# Patient Record
Sex: MALE | Race: BLACK OR AFRICAN AMERICAN | NOT HISPANIC OR LATINO | Employment: UNEMPLOYED | URBAN - METROPOLITAN AREA
[De-identification: names, ages, dates, MRNs, and addresses within clinical notes are randomized per-mention and may not be internally consistent; named-entity substitution may affect disease eponyms.]

---

## 2021-05-22 ENCOUNTER — APPOINTMENT (OUTPATIENT)
Dept: VACCINE CLINIC | Facility: HOSPITAL | Age: 21
End: 2021-05-22

## 2022-08-31 ENCOUNTER — OFFICE VISIT (OUTPATIENT)
Dept: FAMILY MEDICINE CLINIC | Facility: CLINIC | Age: 22
End: 2022-08-31

## 2022-08-31 ENCOUNTER — TELEPHONE (OUTPATIENT)
Dept: FAMILY MEDICINE CLINIC | Facility: CLINIC | Age: 22
End: 2022-08-31

## 2022-08-31 VITALS
DIASTOLIC BLOOD PRESSURE: 88 MMHG | BODY MASS INDEX: 32.88 KG/M2 | OXYGEN SATURATION: 100 % | WEIGHT: 222 LBS | TEMPERATURE: 97.7 F | HEART RATE: 73 BPM | SYSTOLIC BLOOD PRESSURE: 138 MMHG | HEIGHT: 69 IN

## 2022-08-31 DIAGNOSIS — Z00.00 PE (PHYSICAL EXAM), ANNUAL: Primary | ICD-10-CM

## 2022-08-31 DIAGNOSIS — K29.61 GASTRIC HEMORRHAGE DUE TO EROSIVE GASTRITIS: ICD-10-CM

## 2022-08-31 PROBLEM — T78.40XA ALLERGIC: Status: ACTIVE | Noted: 2022-08-31

## 2022-08-31 PROBLEM — K21.9 GERD (GASTROESOPHAGEAL REFLUX DISEASE): Status: ACTIVE | Noted: 2022-08-31

## 2022-08-31 PROBLEM — J45.909 ASTHMA: Status: ACTIVE | Noted: 2022-08-31

## 2022-08-31 PROBLEM — F90.9 ADHD: Status: ACTIVE | Noted: 2022-08-31

## 2022-08-31 LAB
25(OH)D3 SERPL-MCNC: 19.7 NG/ML (ref 30–100)
ALBUMIN SERPL-MCNC: 4.8 G/DL (ref 3.5–5.2)
ALBUMIN/GLOB SERPL: 1.9 G/DL
ALP SERPL-CCNC: 76 U/L (ref 39–117)
ALT SERPL W P-5'-P-CCNC: 15 U/L (ref 1–41)
ANION GAP SERPL CALCULATED.3IONS-SCNC: 9 MMOL/L (ref 5–15)
AST SERPL-CCNC: 17 U/L (ref 1–40)
BILIRUB SERPL-MCNC: 0.8 MG/DL (ref 0–1.2)
BUN SERPL-MCNC: 9 MG/DL (ref 6–20)
BUN/CREAT SERPL: 8.7 (ref 7–25)
CALCIUM SPEC-SCNC: 9.7 MG/DL (ref 8.6–10.5)
CHLORIDE SERPL-SCNC: 103 MMOL/L (ref 98–107)
CHOLEST SERPL-MCNC: 145 MG/DL (ref 0–200)
CO2 SERPL-SCNC: 27 MMOL/L (ref 22–29)
CREAT SERPL-MCNC: 1.04 MG/DL (ref 0.76–1.27)
DEPRECATED RDW RBC AUTO: 36.2 FL (ref 37–54)
EGFRCR SERPLBLD CKD-EPI 2021: 103.5 ML/MIN/1.73
ERYTHROCYTE [DISTWIDTH] IN BLOOD BY AUTOMATED COUNT: 13 % (ref 12.3–15.4)
GLOBULIN UR ELPH-MCNC: 2.5 GM/DL
GLUCOSE SERPL-MCNC: 83 MG/DL (ref 65–99)
HCT VFR BLD AUTO: 50.3 % (ref 37.5–51)
HDLC SERPL-MCNC: 36 MG/DL (ref 40–60)
HGB BLD-MCNC: 16.5 G/DL (ref 13–17.7)
LDLC SERPL CALC-MCNC: 90 MG/DL (ref 0–100)
LDLC/HDLC SERPL: 2.45 {RATIO}
MCH RBC QN AUTO: 25.9 PG (ref 26.6–33)
MCHC RBC AUTO-ENTMCNC: 32.8 G/DL (ref 31.5–35.7)
MCV RBC AUTO: 79.1 FL (ref 79–97)
PLATELET # BLD AUTO: 260 10*3/MM3 (ref 140–450)
PMV BLD AUTO: 11.7 FL (ref 6–12)
POTASSIUM SERPL-SCNC: 4 MMOL/L (ref 3.5–5.2)
PROT SERPL-MCNC: 7.3 G/DL (ref 6–8.5)
RBC # BLD AUTO: 6.36 10*6/MM3 (ref 4.14–5.8)
SODIUM SERPL-SCNC: 139 MMOL/L (ref 136–145)
TRIGL SERPL-MCNC: 104 MG/DL (ref 0–150)
VLDLC SERPL-MCNC: 19 MG/DL (ref 5–40)
WBC NRBC COR # BLD: 7.95 10*3/MM3 (ref 3.4–10.8)

## 2022-08-31 PROCEDURE — 36415 COLL VENOUS BLD VENIPUNCTURE: CPT | Performed by: INTERNAL MEDICINE

## 2022-08-31 PROCEDURE — 85027 COMPLETE CBC AUTOMATED: CPT | Performed by: INTERNAL MEDICINE

## 2022-08-31 PROCEDURE — 99385 PREV VISIT NEW AGE 18-39: CPT | Performed by: INTERNAL MEDICINE

## 2022-08-31 PROCEDURE — 80053 COMPREHEN METABOLIC PANEL: CPT | Performed by: INTERNAL MEDICINE

## 2022-08-31 PROCEDURE — 82306 VITAMIN D 25 HYDROXY: CPT | Performed by: INTERNAL MEDICINE

## 2022-08-31 PROCEDURE — 80061 LIPID PANEL: CPT | Performed by: INTERNAL MEDICINE

## 2022-08-31 RX ORDER — ERGOCALCIFEROL 1.25 MG/1
50000 CAPSULE ORAL WEEKLY
Qty: 16 CAPSULE | Refills: 3 | Status: SHIPPED | OUTPATIENT
Start: 2022-08-31

## 2022-08-31 RX ORDER — MONTELUKAST SODIUM 10 MG/1
TABLET ORAL
COMMUNITY
End: 2022-09-08 | Stop reason: SDUPTHER

## 2022-08-31 RX ORDER — EPINEPHRINE 0.3 MG/.3ML
0.3 INJECTION SUBCUTANEOUS
COMMUNITY
Start: 2022-04-28

## 2022-08-31 RX ORDER — LEVALBUTEROL TARTRATE 45 UG/1
1-2 AEROSOL, METERED ORAL
COMMUNITY
Start: 2022-04-28 | End: 2023-03-01 | Stop reason: SDUPTHER

## 2022-08-31 RX ORDER — LEVALBUTEROL INHALATION SOLUTION 0.63 MG/3ML
SOLUTION RESPIRATORY (INHALATION)
COMMUNITY
End: 2023-03-01 | Stop reason: SDUPTHER

## 2022-08-31 RX ORDER — CETIRIZINE HYDROCHLORIDE 10 MG/1
1 TABLET ORAL DAILY
COMMUNITY
Start: 2022-04-26 | End: 2022-09-01 | Stop reason: SDUPTHER

## 2022-08-31 NOTE — PROGRESS NOTES
"Chief Complaint  new pcp    Subjective        Brandon Marsh presents to [facility]  History of Present Illness patient was seen for a physical.  Patient diet and activity discussed at this visit.  Patient had a blood pressure in the 138/88.  Patient is only 21 years old.  Patient was advised to check blood pressure and follow-up in 1 month.  Patient also had a severe history of his erosive gastritis as a child.  Patient was so bad they were thinking about inserting a G-tube.  Patient is being scheduled for EGD to assess his status.    Objective   Vital Signs:  Blood Pressure 138/88   Pulse 73   Temperature 97.7 °F (36.5 °C)   Height 175.3 cm (69\")   Weight 101 kg (222 lb)   Oxygen Saturation 100%   Body Mass Index 32.78 kg/m²   Estimated body mass index is 32.78 kg/m² as calculated from the following:    Height as of this encounter: 175.3 cm (69\").    Weight as of this encounter: 101 kg (222 lb).          Physical Exam  Vitals and nursing note reviewed.   Constitutional:       General: He is not in acute distress.     Appearance: Normal appearance. He is well-developed. He is not ill-appearing, toxic-appearing or diaphoretic.   HENT:      Head: Normocephalic and atraumatic.      Right Ear: Tympanic membrane, ear canal and external ear normal. There is no impacted cerumen.      Left Ear: Tympanic membrane, ear canal and external ear normal. There is no impacted cerumen.      Nose: Nose normal. No congestion or rhinorrhea.      Mouth/Throat:      Mouth: Mucous membranes are moist.      Pharynx: Oropharynx is clear. No oropharyngeal exudate or posterior oropharyngeal erythema.   Eyes:      General: No scleral icterus.        Right eye: No discharge.         Left eye: No discharge.      Extraocular Movements: Extraocular movements intact.      Conjunctiva/sclera: Conjunctivae normal.      Pupils: Pupils are equal, round, and reactive to light.   Neck:      Thyroid: No thyromegaly. "      Vascular: No carotid bruit or JVD.      Trachea: No tracheal deviation.   Cardiovascular:      Rate and Rhythm: Normal rate and regular rhythm.      Heart sounds: Normal heart sounds. No murmur heard.    No friction rub. No gallop.   Pulmonary:      Effort: Pulmonary effort is normal. No respiratory distress.      Breath sounds: Normal breath sounds. No stridor. No wheezing, rhonchi or rales.   Chest:      Chest wall: No tenderness.   Abdominal:      General: Bowel sounds are normal. There is no distension.      Palpations: Abdomen is soft. There is no mass.      Tenderness: There is no abdominal tenderness. There is no right CVA tenderness, left CVA tenderness, guarding or rebound.      Hernia: No hernia is present.   Musculoskeletal:         General: No swelling, tenderness, deformity or signs of injury. Normal range of motion.      Cervical back: Normal range of motion and neck supple. No rigidity. No muscular tenderness.      Right lower leg: No edema.      Left lower leg: No edema.   Lymphadenopathy:      Cervical: No cervical adenopathy.   Skin:     General: Skin is warm and dry.      Coloration: Skin is not jaundiced or pale.      Findings: No bruising, erythema, lesion or rash.   Neurological:      General: No focal deficit present.      Mental Status: He is alert and oriented to person, place, and time. Mental status is at baseline.      Cranial Nerves: No cranial nerve deficit.      Sensory: No sensory deficit.      Motor: No weakness or abnormal muscle tone.      Coordination: Coordination normal.      Gait: Gait normal.      Deep Tendon Reflexes: Reflexes normal.   Psychiatric:         Mood and Affect: Mood normal.         Behavior: Behavior normal.         Thought Content: Thought content normal.         Judgment: Judgment normal.        Result Review :                Assessment and Plan  #1 physical #2 labs #3 DASH diet with low impact exercise 30 minutes 3-5 times a week #4 monitor blood pressure  follow-up 1 month  Diagnoses and all orders for this visit:    1. PE (physical exam), annual (Primary)  -     CBC (No Diff); Future  -     Comprehensive Metabolic Panel; Future  -     Lipid Panel; Future  -     Vitamin D 25 Hydroxy; Future    2. Gastric hemorrhage due to erosive gastritis  -     Ambulatory Referral to Gastroenterology             Follow Up   Return in about 1 year (around 8/31/2023), or if symptoms worsen or fail to improve, for Annual physical.  Patient was given instructions and counseling regarding his condition or for health maintenance advice. Please see specific information pulled into the AVS if appropriate.

## 2022-09-01 DIAGNOSIS — D56.0 ALPHA THALASSEMIA: Primary | ICD-10-CM

## 2022-09-01 RX ORDER — CETIRIZINE HYDROCHLORIDE 10 MG/1
10 TABLET ORAL DAILY
Qty: 30 TABLET | Refills: 1 | Status: SHIPPED | OUTPATIENT
Start: 2022-09-01 | End: 2022-10-25

## 2022-09-01 NOTE — TELEPHONE ENCOUNTER
Caller: Maximo Brandon    Relationship: Self    Best call back number: [phone] (H)      Requested Prescriptions:   Requested Prescriptions     Pending Prescriptions Disp Refills   • cetirizine (zyrTEC) 10 MG tablet       Sig: Take 1 tablet by mouth Daily.        Pharmacy where request should be sent: [facility]/PHARMACY #[id number] [place], [place] - [address] . - [phone]  - [phone] FX     Additional details provided by patient:     Does the patient have less than a 3 day supply:  [x] Yes  [] No    Rere Lira Rep   09/01/22 10:45 EDT

## 2022-09-08 RX ORDER — MONTELUKAST SODIUM 10 MG/1
10 TABLET ORAL NIGHTLY
Qty: 30 TABLET | Refills: 1 | Status: SHIPPED | OUTPATIENT
Start: 2022-09-08 | End: 2022-11-04

## 2022-09-12 RX ORDER — MONTELUKAST SODIUM 10 MG/1
TABLET ORAL
OUTPATIENT
Start: 2022-09-12

## 2022-09-19 ENCOUNTER — PATIENT ROUNDING (BHMG ONLY) (OUTPATIENT)
Dept: GASTROENTEROLOGY | Facility: CLINIC | Age: 22
End: 2022-09-19

## 2022-09-19 ENCOUNTER — OFFICE VISIT (OUTPATIENT)
Dept: GASTROENTEROLOGY | Facility: CLINIC | Age: 22
End: 2022-09-19

## 2022-09-19 VITALS
DIASTOLIC BLOOD PRESSURE: 73 MMHG | HEIGHT: 69 IN | SYSTOLIC BLOOD PRESSURE: 114 MMHG | WEIGHT: 216.5 LBS | BODY MASS INDEX: 32.07 KG/M2 | HEART RATE: 83 BPM | TEMPERATURE: 97.2 F

## 2022-09-19 DIAGNOSIS — K21.9 GASTROESOPHAGEAL REFLUX DISEASE, UNSPECIFIED WHETHER ESOPHAGITIS PRESENT: Primary | ICD-10-CM

## 2022-09-19 PROCEDURE — 99204 OFFICE O/P NEW MOD 45 MIN: CPT | Performed by: INTERNAL MEDICINE

## 2022-09-19 NOTE — PROGRESS NOTES
Chief Complaint   Patient presents with   • Heartburn   • gastritis     Subjective   HPI     Brandon Marsh is a 21 y.o. male who presents today for new patient evaluation.  He is here to establish GI care.  He has a history of severe reflux as a child and was followed regularly for years by pediatric GI reportedly undergoing EGD on an almost yearly basis due to severe gastritis and esophagitis.  Previously been on various PPI formulations and even Reglan at one point.  Actually seems to have been doing relatively well over the last few years with minimal symptoms although the patient himself does not provide much collateral history most of the information was obtained from his mother who accompanied him with this visit.  She believes his last EGD was probably performed in middle school.  He is not currently requiring PPI therapy.         Objective      Vitals:    09/19/22 1356   BP: 114/73   Pulse: 83   Temp: 97.2 °F (36.2 °C)        Physical Exam  Vitals reviewed.   Constitutional:       Appearance: He is well-developed.   HENT:      Head: Normocephalic and atraumatic.   Neurological:      Mental Status: He is alert and oriented to person, place, and time.   Psychiatric:         Behavior: Behavior normal.         Thought Content: Thought content normal.         Judgment: Judgment normal.                   Assessment & Plan   Assessment:     1. Gastroesophageal reflux disease, unspecified whether esophagitis present      Plan:   21-year-old 21-year-old gentleman with longstanding heartburn dating back to childhood previously requiring frequent EGD.  Would recommend that we obtain an EGD for baseline and will determine if surveillance is necessary certainly if he were to have any findings of Perla's or other concerning features.  We will hold off on PPI therapy for now given relatively minimal symptoms he can use Pepcid as needed.              Ronak Rodriguez M.D.  [facility]  [id number]  [place] [address]  Office: [phone]

## 2022-09-19 NOTE — PROGRESS NOTES
September 19, 2022    Hello, may I speak with Brandon Marsh?    My name is Yen       I am  with [facility]  [address] [place] [id number] [phone].    Before we get started may I verify your date of birth? 2000    I am calling to officially welcome you to our practice and ask about your recent visit. Is this a good time to talk? Yes    Tell me about your visit with us. What things went well?  My entire visit went good.       We're always looking for ways to make our patients' experiences even better. Do you have recommendations on ways we may improve?  No.     Overall were you satisfied with your first visit to our practice? Yes       I appreciate you taking the time to speak with me today. Is there anything else I can do for you? No      Thank you, and have a great day.

## 2022-09-29 NOTE — PROGRESS NOTES
REFERRING PROVIDER:    Everett Dodd MD  [address] [place] [address]    REASON FOR CONSULTATION:    Family history of alpha thalassemia  Family history of von Willebrand's disease    HISTORY OF PRESENT ILLNESS:  Brandon Marsh is a 21 y.o. male who is referred today for the above issues.    His father has alpha thalassemia trait.  His mother has type I von Willebrand's disease.    The patient had epistaxis as a child, even into his teenage years.  This required cauterization.  He has not had any other bleeding.  He had his wisdom teeth extracted in 2019 without bleeding problems.    He has asthma related likely to a history of severe acid reflux as a child.  Otherwise no issues.  He states he feels well.        Past Medical History:   Diagnosis Date   • Allergic    • Asthma    • GERD (gastroesophageal reflux disease)    • Seasonal allergies        Past Surgical History:   Procedure Laterality Date   • COLONOSCOPY     • ENDOSCOPY N/A    • UPPER GASTROINTESTINAL ENDOSCOPY         SOCIAL HISTORY:   reports that he has never smoked. He has never used smokeless tobacco. He reports that he does not drink alcohol and does not use drugs.    FAMILY HISTORY:  family history includes Anxiety disorder in his father and sister; Arthritis in his mother; Cancer in his mother; Depression in his father; Heart disease in his father; Hypertension in his father.    ALLERGIES:  Allergies   Allergen Reactions   • Nuts Shortness Of Breath     tree       MEDICATIONS:  The medication list has been reviewed with the patient by the medical assistant, and the list has been updated in the electronic medical record, which I reviewed.  Medication dosages and frequencies were confirmed to be accurate.    Review of Systems   HENT: Positive for nosebleeds (Last as a teenager, none recently).    All other systems reviewed and are negative.      PHYSICAL EXAMINATION:  Vitals:    09/30/22 0958   BP: 132/80   Pulse: 81   Resp: 16  "  Temp: 98.2 °F (36.8 °C)   TempSrc: Temporal   SpO2: 97%   Weight: 98.5 kg (217 lb 3.2 oz)   Height: 175.3 cm (69.02\")   PainSc: 0-No pain     General:  No acute distress, awake, alert and oriented  Skin:  Warm and dry, no visible rash  HEENT:  Normocephalic/atraumatic.  Wearing a face mask.  Chest:  Normal respiratory effort  Extremities:  No visible clubbing, cyanosis, or edema  Neuro/psych:  Grossly nonfocal.  Normal mood and affect.        DIAGNOSTIC DATA:  CBC & Differential (09/30/2022 09:28)      IMAGING:    None reviewed  ASSESSMENT:  This is a 21 y.o. male with:    *Family history of alpha thalassemia  • His father has alpha thalassemia trait  • I suspect the patient has this to as his red blood cell count is mildly elevated and he has a mild microcytosis    *Family history of von Willebrand's disease  • His mother has type I von Willebrand's disease, treated with DDAVP  • The patient has a history of epistaxis as a child and teenager but he currently has no bleeding  • He did not have any bleeding after wisdom teeth extraction in 2019    PLAN:   1. Laboratory evaluation as noted below.  We will notify him of pertinent results and I will otherwise see him back in about 1 month to go over the results in more detail    ORDERS PLACED DURING THIS ENCOUNTER  Orders Placed This Encounter   Procedures   • Hemoglobinopathy Fractionation Cascade     Order Specific Question:   Release to patient     Answer:   Routine Release   • Alpha - Thalassemia     Order Specific Question:   Release to patient     Answer:   Routine Release   • Protime-INR     Order Specific Question:   Is the Patient on Coumadin (Warfarin) therapy?     Answer:   No   • aPTT     Order Specific Question:   Patient Receiving Heparin Therapy?     Answer:   No   • Fibrinogen     Order Specific Question:   Release to patient     Answer:   Routine Release   • Platelet Function Test     Order Specific Question:   Release to patient     Answer:   Routine " Release   • Von Willebrand Panel     Order Specific Question:   Release to patient     Answer:   Routine Release   • Ferritin     Order Specific Question:   Is the patient on iron therapy?     Answer:   No     Order Specific Question:   Release to patient     Answer:   Routine Release   • Iron Profile     Order Specific Question:   Is the patient on iron therapy?     Answer:   No     Order Specific Question:   Release to patient     Answer:   Routine Release   • Retic With IRF & RET-He     Order Specific Question:   Release to patient     Answer:   Routine Release   • ABO Type     Order Specific Question:   Release to patient     Answer:   Routine Release   • CBC & Differential     Standing Status:   Future     Number of Occurrences:   1     Standing Expiration Date:   9/29/2023     Order Specific Question:   Manual Differential     Answer:   No     Order Specific Question:   Release to patient     Answer:   Routine Release

## 2022-09-30 ENCOUNTER — LAB (OUTPATIENT)
Dept: OTHER | Facility: HOSPITAL | Age: 22
End: 2022-09-30

## 2022-09-30 ENCOUNTER — CONSULT (OUTPATIENT)
Dept: ONCOLOGY | Facility: CLINIC | Age: 22
End: 2022-09-30

## 2022-09-30 VITALS
DIASTOLIC BLOOD PRESSURE: 80 MMHG | WEIGHT: 217.2 LBS | HEART RATE: 81 BPM | RESPIRATION RATE: 16 BRPM | TEMPERATURE: 98.2 F | OXYGEN SATURATION: 97 % | SYSTOLIC BLOOD PRESSURE: 132 MMHG | HEIGHT: 69 IN | BODY MASS INDEX: 32.17 KG/M2

## 2022-09-30 DIAGNOSIS — D56.0 ALPHA THALASSEMIA: Primary | ICD-10-CM

## 2022-09-30 DIAGNOSIS — Z83.2 FAMILY HISTORY OF VON WILLEBRAND DISEASE: ICD-10-CM

## 2022-09-30 DIAGNOSIS — Z83.2 FAMILY HISTORY OF ALPHA THALASSEMIA: ICD-10-CM

## 2022-09-30 DIAGNOSIS — R71.8 MICROCYTOSIS: ICD-10-CM

## 2022-09-30 DIAGNOSIS — D56.0 ALPHA THALASSEMIA: ICD-10-CM

## 2022-09-30 LAB
ABO GROUP BLD: NORMAL
APTT PPP: 30.5 SECONDS (ref 22.7–35.4)
BASOPHILS # BLD AUTO: 0.06 10*3/MM3 (ref 0–0.2)
BASOPHILS NFR BLD AUTO: 1 % (ref 0–1.5)
CLOSURE TME COLL+ADP BLD: 116 SECONDS (ref 52–122)
CLOSURE TME COLL+EPINEP BLD: 187 SECONDS (ref 68–148)
DEPRECATED RDW RBC AUTO: 37 FL (ref 37–54)
EOSINOPHIL # BLD AUTO: 0.3 10*3/MM3 (ref 0–0.4)
EOSINOPHIL NFR BLD AUTO: 5 % (ref 0.3–6.2)
ERYTHROCYTE [DISTWIDTH] IN BLOOD BY AUTOMATED COUNT: 13.1 % (ref 12.3–15.4)
FERRITIN SERPL-MCNC: 122.2 NG/ML (ref 30–400)
FIBRINOGEN PPP-MCNC: 298 MG/DL (ref 219–464)
HCT VFR BLD AUTO: 49.2 % (ref 37.5–51)
HGB BLD-MCNC: 16.2 G/DL (ref 13–17.7)
HGB RETIC QN AUTO: 30.8 PG (ref 29.8–36.1)
IMM GRANULOCYTES # BLD AUTO: 0.04 10*3/MM3 (ref 0–0.05)
IMM GRANULOCYTES NFR BLD AUTO: 0.7 % (ref 0–0.5)
IMM RETICS NFR: 9 % (ref 3–15.8)
INR PPP: 1.08 (ref 0.9–1.1)
IRON 24H UR-MRATE: 148 MCG/DL (ref 59–158)
IRON SATN MFR SERPL: 34 % (ref 20–50)
LYMPHOCYTES # BLD AUTO: 2.03 10*3/MM3 (ref 0.7–3.1)
LYMPHOCYTES NFR BLD AUTO: 33.8 % (ref 19.6–45.3)
MCH RBC QN AUTO: 25.8 PG (ref 26.6–33)
MCHC RBC AUTO-ENTMCNC: 32.9 G/DL (ref 31.5–35.7)
MCV RBC AUTO: 78.2 FL (ref 79–97)
MONOCYTES # BLD AUTO: 0.69 10*3/MM3 (ref 0.1–0.9)
MONOCYTES NFR BLD AUTO: 11.5 % (ref 5–12)
NEUTROPHILS NFR BLD AUTO: 2.89 10*3/MM3 (ref 1.7–7)
NEUTROPHILS NFR BLD AUTO: 48 % (ref 42.7–76)
NRBC BLD AUTO-RTO: 0 /100 WBC (ref 0–0.2)
PLATELET # BLD AUTO: 235 10*3/MM3 (ref 140–450)
PMV BLD AUTO: 11.6 FL (ref 6–12)
PROTHROMBIN TIME: 13.9 SECONDS (ref 11.7–14.2)
RBC # BLD AUTO: 6.29 10*6/MM3 (ref 4.14–5.8)
RETICS # AUTO: 0.07 10*6/MM3 (ref 0.02–0.13)
RETICS/RBC NFR AUTO: 1.13 % (ref 0.7–1.9)
TIBC SERPL-MCNC: 441 MCG/DL (ref 298–536)
TRANSFERRIN SERPL-MCNC: 296 MG/DL (ref 200–360)
WBC NRBC COR # BLD: 6.01 10*3/MM3 (ref 3.4–10.8)

## 2022-09-30 PROCEDURE — 83540 ASSAY OF IRON: CPT | Performed by: INTERNAL MEDICINE

## 2022-09-30 PROCEDURE — 85025 COMPLETE CBC W/AUTO DIFF WBC: CPT | Performed by: INTERNAL MEDICINE

## 2022-09-30 PROCEDURE — 99204 OFFICE O/P NEW MOD 45 MIN: CPT | Performed by: INTERNAL MEDICINE

## 2022-09-30 PROCEDURE — 85730 THROMBOPLASTIN TIME PARTIAL: CPT | Performed by: INTERNAL MEDICINE

## 2022-09-30 PROCEDURE — 85245 CLOT FACTOR VIII VW RISTOCTN: CPT | Performed by: INTERNAL MEDICINE

## 2022-09-30 PROCEDURE — 82728 ASSAY OF FERRITIN: CPT | Performed by: INTERNAL MEDICINE

## 2022-09-30 PROCEDURE — 85246 CLOT FACTOR VIII VW ANTIGEN: CPT | Performed by: INTERNAL MEDICINE

## 2022-09-30 PROCEDURE — 85610 PROTHROMBIN TIME: CPT | Performed by: INTERNAL MEDICINE

## 2022-09-30 PROCEDURE — 85576 BLOOD PLATELET AGGREGATION: CPT | Performed by: INTERNAL MEDICINE

## 2022-09-30 PROCEDURE — 85240 CLOT FACTOR VIII AHG 1 STAGE: CPT | Performed by: INTERNAL MEDICINE

## 2022-09-30 PROCEDURE — 84466 ASSAY OF TRANSFERRIN: CPT | Performed by: INTERNAL MEDICINE

## 2022-09-30 PROCEDURE — 36415 COLL VENOUS BLD VENIPUNCTURE: CPT

## 2022-09-30 PROCEDURE — 81257 HBA1/HBA2 GENE: CPT | Performed by: INTERNAL MEDICINE

## 2022-09-30 PROCEDURE — 85384 FIBRINOGEN ACTIVITY: CPT | Performed by: INTERNAL MEDICINE

## 2022-09-30 PROCEDURE — 85046 RETICYTE/HGB CONCENTRATE: CPT | Performed by: INTERNAL MEDICINE

## 2022-09-30 PROCEDURE — 83020 HEMOGLOBIN ELECTROPHORESIS: CPT | Performed by: INTERNAL MEDICINE

## 2022-10-03 LAB
FACT VIII ACT/NOR PPP: 89 % (ref 56–140)
HGB A MFR BLD ELPH: 97.8 % (ref 96.4–98.8)
HGB A2 MFR BLD ELPH: 2.2 % (ref 1.8–3.2)
HGB F MFR BLD ELPH: 0 % (ref 0–2)
HGB FRACT BLD-IMP: NORMAL
HGB S MFR BLD ELPH: 0 %
PATH INTERP BLD-IMP: NORMAL
VWF AG ACT/NOR PPP IA: 60 % (ref 50–200)
VWF:RCO ACT/NOR PPP PL AGG: 50 % (ref 50–200)

## 2022-10-11 ENCOUNTER — OFFICE VISIT (OUTPATIENT)
Dept: FAMILY MEDICINE CLINIC | Facility: CLINIC | Age: 22
End: 2022-10-11

## 2022-10-11 VITALS
WEIGHT: 215.6 LBS | OXYGEN SATURATION: 98 % | DIASTOLIC BLOOD PRESSURE: 68 MMHG | HEIGHT: 69 IN | TEMPERATURE: 98.2 F | SYSTOLIC BLOOD PRESSURE: 120 MMHG | HEART RATE: 78 BPM | BODY MASS INDEX: 31.93 KG/M2

## 2022-10-11 DIAGNOSIS — K21.00 GASTROESOPHAGEAL REFLUX DISEASE WITH ESOPHAGITIS WITHOUT HEMORRHAGE: Primary | ICD-10-CM

## 2022-10-11 DIAGNOSIS — R68.89 CHANGE IN BLOOD PRESSURE: ICD-10-CM

## 2022-10-11 DIAGNOSIS — T78.40XA ALLERGIC DISORDER, INITIAL ENCOUNTER: ICD-10-CM

## 2022-10-11 PROCEDURE — 99213 OFFICE O/P EST LOW 20 MIN: CPT | Performed by: INTERNAL MEDICINE

## 2022-10-11 RX ORDER — LANSOPRAZOLE 30 MG/1
30 CAPSULE, DELAYED RELEASE ORAL DAILY
Qty: 30 CAPSULE | Refills: 3 | Status: SHIPPED | OUTPATIENT
Start: 2022-10-11 | End: 2023-03-01 | Stop reason: SDUPTHER

## 2022-10-11 NOTE — PROGRESS NOTES
"Chief Complaint  1 month f/u and Blood Pressure Check    Subjective        Brandon Marsh presents to [facility]  History of Present Illness patient was seen for gastroesophageal reflux.  Patient does have Prevacid 30 mg daily as needed for reflux.  Patient is seeing his gastroenterologist and will follow-up with him.  Patient does have chronic allergies and is using Asmanex with cetirizine 10 mg daily.  Patient is getting relief with these medications.  Patient does have fluctuations of blood pressure.  Patient's pressure at home is now ranging from 1 30-1 20s over 80s.  Patient will continue to monitor monthly.    Dictated utilizing Dragon dictation. If there are questions or for further clarification, please contact me.    Objective   Vital Signs:  Blood Pressure 120/68   Pulse 78   Temperature 98.2 °F (36.8 °C)   Height 175.3 cm (69\")   Weight 97.8 kg (215 lb 9.6 oz)   Oxygen Saturation 98%   Body Mass Index 31.84 kg/m²   Estimated body mass index is 31.84 kg/m² as calculated from the following:    Height as of this encounter: 175.3 cm (69\").    Weight as of this encounter: 97.8 kg (215 lb 9.6 oz).          Physical Exam  Vitals and nursing note reviewed.   Constitutional:       Appearance: Normal appearance. He is well-developed.   HENT:      Head: Normocephalic and atraumatic.      Nose: Nose normal.      Mouth/Throat:      Mouth: Mucous membranes are moist.      Pharynx: Oropharynx is clear.   Eyes:      Extraocular Movements: Extraocular movements intact.      Conjunctiva/sclera: Conjunctivae normal.      Pupils: Pupils are equal, round, and reactive to light.   Cardiovascular:      Rate and Rhythm: Normal rate and regular rhythm.      Heart sounds: Normal heart sounds. No murmur heard.    No friction rub. No gallop.   Pulmonary:      Effort: Pulmonary effort is normal. No respiratory distress.      Breath sounds: Normal breath sounds. No stridor. No wheezing, rhonchi or " rales.   Chest:      Chest wall: No tenderness.   Abdominal:      General: Bowel sounds are normal.      Palpations: Abdomen is soft.   Musculoskeletal:         General: Normal range of motion.      Cervical back: Normal range of motion and neck supple.   Skin:     General: Skin is warm and dry.   Neurological:      General: No focal deficit present.      Mental Status: He is alert and oriented to person, place, and time. Mental status is at baseline.   Psychiatric:         Mood and Affect: Mood normal.         Behavior: Behavior normal.         Thought Content: Thought content normal.         Judgment: Judgment normal.        Result Review :                Assessment and Plan  #1 continue monitor blood pressure daily #2 follow-up with gastroenterologist  Diagnoses and all orders for this visit:    1. Gastroesophageal reflux disease with esophagitis without hemorrhage (Primary)    2. Allergic disorder, initial encounter    3. Change in blood pressure    Other orders  -     lansoprazole (Prevacid) 30 MG capsule; Take 1 capsule by mouth Daily.  Dispense: 30 capsule; Refill: 3             Follow Up   Return in about 1 year (around 10/11/2023), or if symptoms worsen or fail to improve, for Annual physical.  Patient was given instructions and counseling regarding his condition or for health maintenance advice. Please see specific information pulled into the AVS if appropriate.

## 2022-10-12 LAB — HBA1 GENE MUT ANL BLD/T: NORMAL

## 2022-10-25 RX ORDER — CETIRIZINE HYDROCHLORIDE 10 MG/1
TABLET ORAL
Qty: 30 TABLET | Refills: 1 | Status: SHIPPED | OUTPATIENT
Start: 2022-10-25 | End: 2022-12-28

## 2022-10-26 NOTE — PROGRESS NOTES
"[facility] GROUP OUTPATIENT FOLLOW UP CLINIC VISIT    REASON FOR FOLLOW-UP:    Alpha plus thalassemia trait  Family history of von Willebrand's disease       HISTORY OF PRESENT ILLNESS:  Brandon Marsh is a 22 y.o. male who returns today for follow up of the above issue.      He feels well.  No issues.  No bleeding.    REVIEW OF SYSTEMS:  As per HPI    PHYSICAL EXAMINATION:    Vitals:    10/28/22 1039   BP: 122/79   Pulse: 76   Resp: 18   Temp: 97.5 °F (36.4 °C)   TempSrc: Temporal   SpO2: 98%   Weight: 97.3 kg (214 lb 9.6 oz)   Height: 175.3 cm (69.02\")   PainSc: 0-No pain     General:  No acute distress, awake, alert and oriented  Skin:  Warm and dry, no visible rash  HEENT:  Normocephalic/atraumatic.  Wearing a face mask.  Chest:  Normal respiratory effort  Extremities:  No visible clubbing, cyanosis, or edema  Neuro/psych:  Grossly nonfocal.  Normal mood and affect.        DIAGNOSTIC DATA:  Von Willebrand Panel (09/30/2022 10:50)  Alpha - Thalassemia (09/30/2022 10:50)  Platelet Function Test (09/30/2022 10:50)      IMAGING:    None reviewed    ASSESSMENT:  This is a 22 y.o. male with:    *Family history of alpha thalassemia  • His father has alpha thalassemia trait  • His red blood cell count is mildly elevated and he has a mild microcytosis   • Testing 9/30/22 positive for alpha alpha / alpha -, alpha + thalassemia trait     *Family history of von Willebrand's disease  • His mother has type I von Willebrand's disease, treated with DDAVP  • The patient has a history of epistaxis as a child and teenager but he currently has no bleeding  • He did not have any bleeding after wisdom teeth extraction in 2019  •  with col/epi high at 187, col/adp normal at 116, not consistent with platelet dysfunction  • Von Willebrand studies normal with activity 50%, antigen 60%, factor VIII activity 89%, blood type 0     PLAN:   There is no evidence for von Willebrand's disease.  I gave him and his mother some " written information regarding alpha thalassemia today.  He is an unaffected carrier.  I advised him and his mother that he could pass this on to any children in the future.  Children should be unaffected unless the mother also is a carrier.  No follow-up will be scheduled.  We are available to see him as needed.

## 2022-10-28 ENCOUNTER — OFFICE VISIT (OUTPATIENT)
Dept: ONCOLOGY | Facility: CLINIC | Age: 22
End: 2022-10-28

## 2022-10-28 ENCOUNTER — APPOINTMENT (OUTPATIENT)
Dept: OTHER | Facility: HOSPITAL | Age: 22
End: 2022-10-28

## 2022-10-28 VITALS
WEIGHT: 214.6 LBS | HEIGHT: 69 IN | HEART RATE: 76 BPM | RESPIRATION RATE: 18 BRPM | SYSTOLIC BLOOD PRESSURE: 122 MMHG | DIASTOLIC BLOOD PRESSURE: 79 MMHG | OXYGEN SATURATION: 98 % | TEMPERATURE: 97.5 F | BODY MASS INDEX: 31.78 KG/M2

## 2022-10-28 DIAGNOSIS — D56.3 ALPHA THALASSEMIA TRAIT: Primary | ICD-10-CM

## 2022-10-28 PROCEDURE — 99213 OFFICE O/P EST LOW 20 MIN: CPT | Performed by: INTERNAL MEDICINE

## 2022-11-04 DIAGNOSIS — Z82.49 FAMILY HISTORY OF MYOCARDIAL INFARCTION: Primary | ICD-10-CM

## 2022-11-04 RX ORDER — MONTELUKAST SODIUM 10 MG/1
TABLET ORAL
Qty: 30 TABLET | Refills: 1 | Status: SHIPPED | OUTPATIENT
Start: 2022-11-04 | End: 2022-12-28

## 2022-11-11 ENCOUNTER — HOSPITAL ENCOUNTER (OUTPATIENT)
Dept: CARDIOLOGY | Facility: HOSPITAL | Age: 22
End: 2022-11-11

## 2022-11-17 ENCOUNTER — ANESTHESIA EVENT (OUTPATIENT)
Dept: GASTROENTEROLOGY | Facility: HOSPITAL | Age: 22
End: 2022-11-17

## 2022-11-17 ENCOUNTER — ANESTHESIA (OUTPATIENT)
Dept: GASTROENTEROLOGY | Facility: HOSPITAL | Age: 22
End: 2022-11-17

## 2022-11-17 ENCOUNTER — HOSPITAL ENCOUNTER (OUTPATIENT)
Facility: HOSPITAL | Age: 22
Setting detail: HOSPITAL OUTPATIENT SURGERY
Discharge: HOME | End: 2022-11-17
Attending: INTERNAL MEDICINE | Admitting: INTERNAL MEDICINE

## 2022-11-17 VITALS
HEIGHT: 69 IN | DIASTOLIC BLOOD PRESSURE: 72 MMHG | BODY MASS INDEX: 31.25 KG/M2 | WEIGHT: 211 LBS | OXYGEN SATURATION: 97 % | HEART RATE: 63 BPM | RESPIRATION RATE: 16 BRPM | SYSTOLIC BLOOD PRESSURE: 108 MMHG

## 2022-11-17 DIAGNOSIS — K21.9 GASTROESOPHAGEAL REFLUX DISEASE, UNSPECIFIED WHETHER ESOPHAGITIS PRESENT: ICD-10-CM

## 2022-11-17 PROCEDURE — 88305 TISSUE EXAM BY PATHOLOGIST: CPT | Performed by: INTERNAL MEDICINE

## 2022-11-17 PROCEDURE — 43239 EGD BIOPSY SINGLE/MULTIPLE: CPT | Performed by: INTERNAL MEDICINE

## 2022-11-17 PROCEDURE — 0 LIDOCAINE 1 % SOLUTION: Performed by: REGISTERED NURSE

## 2022-11-17 PROCEDURE — 25010000002 PROPOFOL 10 MG/ML EMULSION: Performed by: REGISTERED NURSE

## 2022-11-17 RX ORDER — ONDANSETRON 2 MG/ML
4 INJECTION INTRAMUSCULAR; INTRAVENOUS ONCE AS NEEDED
Status: DISCONTINUED | OUTPATIENT
Start: 2022-11-17 | End: 2022-11-17 | Stop reason: HOSPADM

## 2022-11-17 RX ORDER — SODIUM CHLORIDE, SODIUM LACTATE, POTASSIUM CHLORIDE, CALCIUM CHLORIDE 600; 310; 30; 20 MG/100ML; MG/100ML; MG/100ML; MG/100ML
30 INJECTION, SOLUTION INTRAVENOUS CONTINUOUS PRN
Status: DISCONTINUED | OUTPATIENT
Start: 2022-11-17 | End: 2022-11-17 | Stop reason: HOSPADM

## 2022-11-17 RX ORDER — GLYCOPYRROLATE 0.2 MG/ML
INJECTION INTRAMUSCULAR; INTRAVENOUS AS NEEDED
Status: DISCONTINUED | OUTPATIENT
Start: 2022-11-17 | End: 2022-11-17 | Stop reason: SURG

## 2022-11-17 RX ORDER — PROPOFOL 10 MG/ML
VIAL (ML) INTRAVENOUS AS NEEDED
Status: DISCONTINUED | OUTPATIENT
Start: 2022-11-17 | End: 2022-11-17 | Stop reason: SURG

## 2022-11-17 RX ORDER — LIDOCAINE HYDROCHLORIDE 10 MG/ML
INJECTION, SOLUTION INFILTRATION; PERINEURAL AS NEEDED
Status: DISCONTINUED | OUTPATIENT
Start: 2022-11-17 | End: 2022-11-17 | Stop reason: SURG

## 2022-11-17 RX ADMIN — PROPOFOL 160 MCG/KG/MIN: 10 INJECTION, EMULSION INTRAVENOUS at 11:10

## 2022-11-17 RX ADMIN — PROPOFOL 100 MG: 10 INJECTION, EMULSION INTRAVENOUS at 11:09

## 2022-11-17 RX ADMIN — GLYCOPYRROLATE 0.2 MG: 0.2 INJECTION INTRAMUSCULAR; INTRAVENOUS at 11:09

## 2022-11-17 RX ADMIN — LIDOCAINE HYDROCHLORIDE 30 MG: 10 INJECTION, SOLUTION INFILTRATION; PERINEURAL at 11:09

## 2022-11-17 RX ADMIN — PROPOFOL 50 MG: 10 INJECTION, EMULSION INTRAVENOUS at 11:12

## 2022-11-17 RX ADMIN — SODIUM CHLORIDE, POTASSIUM CHLORIDE, SODIUM LACTATE AND CALCIUM CHLORIDE 30 ML/HR: 600; 310; 30; 20 INJECTION, SOLUTION INTRAVENOUS at 10:46

## 2022-11-17 NOTE — H&P
[facility]  Pre Procedure History & Physical    Chief Complaint:   GERD    Subjective     HPI:   22 y.o. male here for EGD for long standing GERD    Past Medical History:   Past Medical History:   Diagnosis Date   • Allergic    • Asthma    • GERD (gastroesophageal reflux disease)    • Seasonal allergies        Past Surgical History:  Past Surgical History:   Procedure Laterality Date   • COLONOSCOPY     • ENDOSCOPY N/A    • UPPER GASTROINTESTINAL ENDOSCOPY         Family History:  Family History   Problem Relation Age of Onset   • Arthritis Mother    • Cervical cancer Mother    • Hyperlipidemia Father    • Arthritis Father    • Depression Father    • Anxiety disorder Father    • Heart disease Father    • Hypertension Father    • Anxiety disorder Sister    • Breast cancer Maternal Aunt    • Lung cancer Paternal Grandmother        Social History:   reports that he has never smoked. He has never used smokeless tobacco. He reports that he does not drink alcohol and does not use drugs.    Medications:   Medications Prior to Admission   Medication Sig Dispense Refill Last Dose   • cetirizine (zyrTEC) 10 MG tablet TAKE 1 TABLET BY MOUTH EVERY DAY 30 tablet 1 11/16/2022   • lansoprazole (Prevacid) 30 MG capsule Take 1 capsule by mouth Daily. 30 capsule 3 Past Week   • montelukast (SINGULAIR) 10 MG tablet TAKE 1 TABLET BY MOUTH EVERY DAY AT NIGHT 30 tablet 1 11/16/2022   • vitamin D (ERGOCALCIFEROL) 1.25 MG (47848 UT) capsule capsule Take 1 capsule by mouth 1 (One) Time Per Week. 16 capsule 3 Past Week   • Asmanex, 60 Metered Doses, 220 MCG/INH inhaler 1 puff 2 (Two) Times a Day.   More than a month   • EPINEPHrine (EPIPEN) 0.3 MG/0.3ML solution auto-injector injection Inject 0.3 mg into the appropriate muscle as directed by prescriber.      • levalbuterol (XOPENEX HFA) 45 MCG/ACT inhaler Inhale 1-2 puffs.   More than a month   • levalbuterol (XOPENEX) 0.63 MG/3ML nebulizer solution      "      Allergies:  Nuts    ROS:    Pertinent items are noted in HPI     Objective     Blood pressure 132/86, pulse 81, resp. rate 13, height 175.3 cm (69\"), weight 95.7 kg (211 lb), SpO2 97 %.    Physical Exam   Constitutional: Pt is oriented to person, place, and time and well-developed, well-nourished, and in no distress.   Mouth/Throat: Oropharynx is clear and moist.   Neck: Normal range of motion.   Cardiovascular: Normal rate, regular rhythm and normal heart sounds.    Pulmonary/Chest: Effort normal and breath sounds normal.   Abdominal: Soft. Nontender  Skin: Skin is warm and dry.   Psychiatric: Mood, memory, affect and judgment normal.     Assessment & Plan     Diagnosis:  GERD    Anticipated Surgical Procedure:  EGD    The risks, benefits, and alternatives of this procedure have been discussed with the patient or the responsible party- the patient understands and agrees to proceed.                                                          "

## 2022-11-17 NOTE — ANESTHESIA PREPROCEDURE EVALUATION
Anesthesia Evaluation     Patient summary reviewed   NPO Solid Status: > 8 hours             Airway   Mallampati: I  Neck ROM: full  No difficulty expected  Dental      Pulmonary     breath sounds clear to auscultation  (+) asthma,  Cardiovascular     Rhythm: regular        Neuro/Psych  GI/Hepatic/Renal/Endo    (+)  GERD,      Musculoskeletal     Abdominal    Substance History      OB/GYN          Other                        Anesthesia Plan    ASA 2     MAC       Anesthetic plan, risks, benefits, and alternatives have been provided, discussed and informed consent has been obtained with: patient.        CODE STATUS:

## 2022-11-17 NOTE — DISCHARGE INSTRUCTIONS

## 2022-11-18 LAB
LAB AP CASE REPORT: NORMAL
LAB AP DIAGNOSIS COMMENT: NORMAL
PATH REPORT.FINAL DX SPEC: NORMAL
PATH REPORT.GROSS SPEC: NORMAL

## 2022-12-06 ENCOUNTER — TELEPHONE (OUTPATIENT)
Dept: GASTROENTEROLOGY | Facility: CLINIC | Age: 22
End: 2022-12-06

## 2022-12-06 NOTE — TELEPHONE ENCOUNTER
Caller: Brandon Marsh    Relationship: Self    Best call back number: [phone]    What is the best time to reach you: ANY    Who are you requesting to speak with (clinical staff, provider,  specific staff member): CLINICAL    What was the call regarding: PT CALLED TO GET RESULTS FROM EGD    Do you require a callback: YES

## 2022-12-22 ENCOUNTER — TELEPHONE (OUTPATIENT)
Dept: FAMILY MEDICINE CLINIC | Facility: CLINIC | Age: 22
End: 2022-12-22

## 2022-12-22 ENCOUNTER — TELEMEDICINE (OUTPATIENT)
Dept: FAMILY MEDICINE CLINIC | Facility: CLINIC | Age: 22
End: 2022-12-22

## 2022-12-22 VITALS — OXYGEN SATURATION: 98 % | TEMPERATURE: 98.5 F

## 2022-12-22 DIAGNOSIS — U07.1 COVID-19 VIRUS DETECTED: Primary | ICD-10-CM

## 2022-12-22 DIAGNOSIS — K29.61 GASTRIC HEMORRHAGE DUE TO EROSIVE GASTRITIS: ICD-10-CM

## 2022-12-22 DIAGNOSIS — D56.3 ALPHA THALASSEMIA TRAIT: ICD-10-CM

## 2022-12-22 PROBLEM — K21.9 GERD (GASTROESOPHAGEAL REFLUX DISEASE): Status: RESOLVED | Noted: 2022-08-31 | Resolved: 2022-12-22

## 2022-12-22 PROCEDURE — 99214 OFFICE O/P EST MOD 30 MIN: CPT | Performed by: INTERNAL MEDICINE

## 2022-12-22 NOTE — PROGRESS NOTES
"Chief Complaint  No chief complaint on file.    Subjective        Brandon Marsh presents to [facility] PRIMARY CARE  History of Present Illness You have chosen to receive care through a telehealth visit.  Do you consent to use a video/audio connection for your medical care today? Yes  Patient was seen for video encounter.  Patient was at home and I was in the office.  Patient had contracted COVID-19 and was staying home.  Patient was doing well and oxygen levels were 98%.  Patient does have a Xopenex inhaler as needed and was informed to go to the emergency room if breathing got difficult.  Patient's temperature also was 98.6.  Patient was also diagnosed with alpha thalassemia trait.  His father also has the trait.  Patient was informed it was a recessive disorder and to make sure he did not  someone with output thalassemia trait.  Patient also has  hemorrhagic gastritis and uses lansoprazole 30 mg daily for treatment.  Patient will continue present treatment.  Patient does not need refills.  Patient had labs back in September.  Patient will continue present treatment and follow-up.    Dictated utilizing Dragon dictation. If there are questions or for further clarification, please contact me.    Objective   Vital Signs:  Temperature 98.5 °F (36.9 °C)   Oxygen Saturation 98%   Estimated body mass index is 31.16 kg/m² as calculated from the following:    Height as of 11/17/22: 175.3 cm (69\").    Weight as of 11/17/22: 95.7 kg (211 lb).    BMI is >= 30 and <35. (Class 1 Obesity). The following options were offered after discussion;: NA      Physical Exam  Constitutional:       Appearance: Normal appearance.   HENT:      Head: Normocephalic and atraumatic.      Nose: Nose normal.   Eyes:      Extraocular Movements: Extraocular movements intact.      Conjunctiva/sclera: Conjunctivae normal.      Pupils: Pupils are equal, round, and reactive to light.   Pulmonary:      Effort: Pulmonary effort is " normal.      Breath sounds: Normal breath sounds.   Musculoskeletal:         General: Normal range of motion.   Neurological:      General: No focal deficit present.      Mental Status: He is alert and oriented to person, place, and time. Mental status is at baseline.   Psychiatric:         Mood and Affect: Mood normal.         Behavior: Behavior normal.         Thought Content: Thought content normal.         Judgment: Judgment normal.        Result Review :  The following data was reviewed by: Everett Dodd MD on 12/22/2022:  Common labs    Common Labs 8/31/22 8/31/22 8/31/22 9/30/22    1107 1107 1107    Glucose  83     BUN  9     Creatinine  1.04     Sodium  139     Potassium  4.0     Chloride  103     Calcium  9.7     Albumin  4.80     Total Bilirubin  0.8     Alkaline Phosphatase  76     AST (SGOT)  17     ALT (SGPT)  15     WBC 7.95   6.01   Hemoglobin 16.5   16.2   Hematocrit 50.3   49.2   Platelets 260   235   Total Cholesterol   145    Triglycerides   104    HDL Cholesterol   36 (A)    LDL Cholesterol    90    (A) Abnormal value            Data reviewed: NA          Assessment and Plan  #1 emergency room if breathing gets worse #2 follow-up in 2 months #3 labs  Diagnoses and all orders for this visit:    1. COVID-19 virus detected (Primary)    2. Alpha thalassemia trait    3. Gastric hemorrhage due to erosive gastritis           I spent 24 minutes caring for Brandon on this date of service. This time includes time spent by me in the following activities:NA  Follow Up   No follow-ups on file.  Patient was given instructions and counseling regarding his condition or for health maintenance advice. Please see specific information pulled into the AVS if appropriate.

## 2022-12-22 NOTE — TELEPHONE ENCOUNTER
PT HAS APPT TODAY AT 1:15, BUT MOM ASKING IF IT CAN BE CHANGED TO A MYCHART VISIT BECAUSE SHE ENDED UP TAKING HIM TO [facility] LAST NIGHT AND HE TESTED POSITIVE FOR COVID.    PT HAS ASTHMA AND ALSO RECENTLY FOUND OUT HE HAS ALPHA THALASSEMIA TRAIT, SO MOM REALLY WANTS HIM TO STILL BE ABLE TO TALK TO DR. BONILLA

## 2022-12-28 RX ORDER — CETIRIZINE HYDROCHLORIDE 10 MG/1
TABLET ORAL
Qty: 30 TABLET | Refills: 1 | Status: SHIPPED | OUTPATIENT
Start: 2022-12-28 | End: 2023-01-23 | Stop reason: SDUPTHER

## 2022-12-28 RX ORDER — MONTELUKAST SODIUM 10 MG/1
TABLET ORAL
Qty: 30 TABLET | Refills: 1 | Status: SHIPPED | OUTPATIENT
Start: 2022-12-28 | End: 2023-03-01 | Stop reason: SDUPTHER

## 2023-01-19 ENCOUNTER — HOSPITAL ENCOUNTER (EMERGENCY)
Facility: HOSPITAL | Age: 23
Discharge: HOME | End: 2023-01-20
Attending: EMERGENCY MEDICINE | Admitting: EMERGENCY MEDICINE
Payer: COMMERCIAL

## 2023-01-19 VITALS
HEIGHT: 69 IN | HEART RATE: 93 BPM | RESPIRATION RATE: 18 BRPM | DIASTOLIC BLOOD PRESSURE: 78 MMHG | SYSTOLIC BLOOD PRESSURE: 141 MMHG | TEMPERATURE: 98.2 F | BODY MASS INDEX: 30.51 KG/M2 | OXYGEN SATURATION: 97 % | WEIGHT: 206 LBS

## 2023-01-19 DIAGNOSIS — S01.25XA DOG BITE OF NOSE, INITIAL ENCOUNTER: Primary | ICD-10-CM
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
## 2023-01-19 PROCEDURE — 25010000002 TETANUS-DIPHTH-ACELL PERTUSSIS 5-2.5-18.5 LF-MCG/0.5 SUSPENSION PREFILLED SYRINGE: Performed by: PHYSICIAN ASSISTANT

## 2023-01-19 PROCEDURE — 90715 TDAP VACCINE 7 YRS/> IM: CPT | Performed by: PHYSICIAN ASSISTANT

## 2023-01-19 PROCEDURE — 90471 IMMUNIZATION ADMIN: CPT | Performed by: PHYSICIAN ASSISTANT

## 2023-01-19 PROCEDURE — 99283 EMERGENCY DEPT VISIT LOW MDM: CPT

## 2023-01-19 PROCEDURE — 0 LIDOCAINE 1 % SOLUTION: Performed by: PHYSICIAN ASSISTANT

## 2023-01-19 RX ORDER — AMOXICILLIN AND CLAVULANATE POTASSIUM 875; 125 MG/1; MG/1
1 TABLET, FILM COATED ORAL ONCE
Status: COMPLETED | OUTPATIENT
Start: 2023-01-19 | End: 2023-01-19

## 2023-01-19 RX ORDER — LIDOCAINE HYDROCHLORIDE 10 MG/ML
10 INJECTION, SOLUTION INFILTRATION; PERINEURAL ONCE
Status: COMPLETED | OUTPATIENT
Start: 2023-01-19 | End: 2023-01-19

## 2023-01-19 RX ADMIN — AMOXICILLIN AND CLAVULANATE POTASSIUM 1 TABLET: 875; 125 TABLET, FILM COATED ORAL at 23:46

## 2023-01-19 RX ADMIN — LIDOCAINE HYDROCHLORIDE 10 ML: 10 INJECTION, SOLUTION INFILTRATION; PERINEURAL at 23:55

## 2023-01-19 RX ADMIN — TETANUS TOXOID, REDUCED DIPHTHERIA TOXOID AND ACELLULAR PERTUSSIS VACCINE, ADSORBED 0.5 ML: 5; 2.5; 8; 8; 2.5 SUSPENSION INTRAMUSCULAR at 23:46

## 2023-01-20 RX ORDER — AMOXICILLIN AND CLAVULANATE POTASSIUM 875; 125 MG/1; MG/1
1 TABLET, FILM COATED ORAL 2 TIMES DAILY
Qty: 20 TABLET | Refills: 0 | Status: SHIPPED | OUTPATIENT
Start: 2023-01-20 | End: 2023-01-30

## 2023-01-20 NOTE — DISCHARGE INSTRUCTIONS
Return to the emergency department should the area become red, swollen, tender, develop discharge, should you develop fever, or have any further concerns.  Recommend suture removal in 7 days.

## 2023-01-20 NOTE — ED PROVIDER NOTES
EMERGENCY DEPARTMENT ENCOUNTER    Room Number:  02/02  Date seen:  1/20/2023  PCP: Everett Dodd MD      HPI:  Chief Complaint: Dog bite to nose  A complete HPI/ROS/PMH/PSH/SH/FH are unobtainable due to: Nothing  Context: Brandon Marsh is a 22 y.o. male who presents to the ED c/o dog bites and has recurrent J PTA.  Patient was playing with the dog when the injury occurred.  The dog was said to be a family pet and the shots are up-to-date.  Patient is unsure if the lacerations were caused by the dog's claws or teeth.  Pain is said to be minimal, localized to the site, nonradiating.  Patient denies sustaining any other injury during the dog encounter.            PAST MEDICAL HISTORY  Active Ambulatory Problems     Diagnosis Date Noted   • Allergic 08/31/2022   • Asthma 08/31/2022   • Gastric hemorrhage due to erosive gastritis 08/31/2022   • ADHD 08/31/2022   • Alpha thalassemia trait 10/28/2022   • COVID-19 virus detected 12/22/2022     Resolved Ambulatory Problems     Diagnosis Date Noted   • GERD (gastroesophageal reflux disease) 08/31/2022     Past Medical History:   Diagnosis Date   • Seasonal allergies          PAST SURGICAL HISTORY  Past Surgical History:   Procedure Laterality Date   • COLONOSCOPY     • ENDOSCOPY N/A    • ENDOSCOPY N/A 11/17/2022    Procedure: ESOPHAGOGASTRODUODENOSCOPY with bx;  Surgeon: Ronak Rodriguez MD;  Location: [facility] ENDOSCOPY;  Service: Gastroenterology;  Laterality: N/A;  pre: GERD  post: gastritis, esophagitis    • UPPER GASTROINTESTINAL ENDOSCOPY           FAMILY HISTORY  Family History   Problem Relation Age of Onset   • Arthritis Mother    • Cervical cancer Mother    • Hyperlipidemia Father    • Arthritis Father    • Depression Father    • Anxiety disorder Father    • Heart disease Father    • Hypertension Father    • Anxiety disorder Sister    • Breast cancer Maternal Aunt    • Lung cancer Paternal Grandmother          SOCIAL HISTORY  Social History      Socioeconomic History   • Marital status: Single   Tobacco Use   • Smoking status: Never   • Smokeless tobacco: Never   Vaping Use   • Vaping Use: Never used   Substance and Sexual Activity   • Alcohol use: Never   • Drug use: Never   • Sexual activity: Never         ALLERGIES  Nuts        REVIEW OF SYSTEMS  Review of Systems     All systems reviewed and negative except for those discussed in HPI.       PHYSICAL EXAM  ED Triage Vitals   Temp Heart Rate Resp BP SpO2   01/19/23 2247 01/19/23 2247 01/19/23 2249 01/19/23 2249 01/19/23 2247   98.2 °F (36.8 °C) 93 18 141/78 97 %      Temp src Heart Rate Source Patient Position BP Location FiO2 (%)   01/19/23 2247 01/19/23 2247 -- -- --   Oral Monitor          Physical Exam      GENERAL: Very pleasant, nontoxic, not distressed  HENT: nares patent.  No intraoral nasal laceration or septal hematoma noted.  1 cm very superficial well approximated laceration at the superior portion/bridge of the nose.  1.5 irregular deeper laceration to the inferior right aspect of the nose overlying the right nare that is slightly gaping open.  EYES: no scleral icterus  CV: regular rhythm, normal rate  RESPIRATORY: normal effort  ABDOMEN: soft  MUSCULOSKELETAL: no deformity  NEURO: alert, moves all extremities, follows commands  PSYCH:  calm, cooperative  SKIN: warm, dry    Vital signs and nursing notes reviewed.          LAB RESULTS  No results found for this or any previous visit (from the past 24 hour(s)).    Ordered the above labs and reviewed the results.        RADIOLOGY  No Radiology Exams Resulted Within Past 24 Hours    Ordered the above noted radiological studies. Reviewed by me in PACS.          PROCEDURES  Laceration Repair    Date/Time: 1/19/2023 11:45 PM  Performed by: Rey Wise III, PA  Authorized by: Irwin Greenberg MD     Consent:     Consent obtained:  Verbal    Consent given by:  Patient    Risks discussed:  Infection and pain  Universal protocol:      Patient identity confirmed:  Verbally with patient  Anesthesia:     Anesthesia method:  Local infiltration    Local anesthetic:  Lidocaine 1% w/o epi  Laceration details:     Location:  Face    Face location:  Nose    Length (cm):  1.5  Pre-procedure details:     Preparation:  Patient was prepped and draped in usual sterile fashion  Treatment:     Area cleansed with:  Povidone-iodine and saline    Amount of cleaning:  Extensive    Irrigation solution:  Sterile saline    Irrigation volume:  500    Irrigation method:  Pressure wash    Debridement:  None    Undermining:  None  Skin repair:     Repair method:  Sutures    Suture size:  6-0    Suture material:  Nylon    Suture technique:  Simple interrupted    Number of sutures:  2  Approximation:     Approximation:  Loose  Repair type:     Repair type:  Simple  Post-procedure details:     Procedure completion:  Tolerated well, no immediate complications  Comments:      Of note.  The superior 1 cm superficial laceration did not need sutures/closure.              MEDICATIONS GIVEN IN ER  Medications   lidocaine (XYLOCAINE) 1 % injection 10 mL (has no administration in time range)   Tetanus-Diphth-Acell Pertussis (BOOSTRIX) injection 0.5 mL (0.5 mL Intramuscular Given 1/19/23 2346)   amoxicillin-clavulanate (AUGMENTIN) 875-125 MG per tablet 1 tablet (1 tablet Oral Given 1/19/23 2346)           MEDICAL DECISION MAKING, PROGRESS, and CONSULTS    All labs have been independently reviewed by me.  All radiology studies have been reviewed by me and discussed with radiologist dictating the report.   EKG's independently viewed and interpreted by me.  Discussion below represents my analysis of pertinent findings related to patient's condition, differential diagnosis, treatment plan and final disposition.      Orders placed during this visit:  Orders Placed This Encounter   Procedures   • Laceration Repair         Additional sources:  - Discussed/obtained information from independent  historians: Family member with history confirms dog shots are UTD.      Additional information was obtained to confirm the patient's history.    - External (non-ED) record review: Reviewed the patient's chart in epic.  Last Tdap January 17.  We will go ahead and obtain  - Chronic or social conditions impacting care: None      Differential diagnosis:    Working diagnosis is dog bite to the nose.  It is uncomplicated at this juncture.      Additional orders considered but not ordered:          Independent interpretation of labs, radiology studies, and discussions with consultants:  ED Course as of 01/20/23 0025   Thu Jan 19, 2023 2256 BP: 141/78 [RC]   2256 Temp: 98.2 °F (36.8 °C) [RC]   2256 Heart Rate: 93 [RC]   2256 Resp: 18 [RC]   2256 SpO2: 97 %  RA [RC]   2346 Suture supplies at bedside.  Still awaiting lidocaine. [RC]      ED Course User Index  [RC] Rey Wise III, PA              PPE: The patient wore a mask throughout the entire encounter. I wore a well-fitting mask.    DIAGNOSIS  Final diagnoses:   Dog bite of nose, initial encounter         DISPOSITION  DISCHARGE    Patient discharged in stable condition.    Reviewed implications of results, diagnosis, meds, responsibility to follow up, warning signs and symptoms of possible worsening, potential complications and reasons to return to ER.    Patient/Family voiced understanding of above instructions.    Discussed plan for discharge, as there is no emergent indication for admission. Patient referred to primary care provider for BP management due to today's BP. Pt/family is agreeable and understands need for follow up and repeat testing.  Pt is aware that discharge does not mean that nothing is wrong but it indicates no emergency is present that requires admission and they must continue care with follow-up as given below or physician of their choice.     FOLLOW-UP  Everett Dodd MD  [address] [id number] [phone]    In  1 week  For suture removal         Medication List      New Prescriptions    amoxicillin-clavulanate 875-125 MG per tablet  Commonly known as: AUGMENTIN  Take 1 tablet by mouth 2 (Two) Times a Day for 10 days.     mupirocin 2 % ointment  Commonly known as: BACTROBAN  Apply 1 application topically to the appropriate area as directed 2 (Two) Times a Day for 7 days.           Where to Get Your Medications      These medications were sent to [facility]/pharmacy #[id number] - [place], [place] - [address] - [phone]  - [phone]   [address] [facility] [address]    Phone: [phone]   · amoxicillin-clavulanate 875-125 MG per tablet  · mupirocin 2 % ointment           Latest Documented Vital Signs:  As of 00:25 EST  BP- 141/78 HR- 93 Temp- 98.2 °F (36.8 °C) (Oral) O2 sat- 97%      --    Please note that portions of this were completed with a voice recognition program.       Note Disclaimer: At [facility], we believe that sharing information builds trust and better relationships. You are receiving this note because you are receiving care at [facility] or recently visited. It is possible you will see health information before a provider has talked with you about it. This kind of information can be easy to misunderstand. To help you fully understand what it means for your health, we urge you to discuss this note with your provider.       Rey Wise III, PA  01/20/23 0026     0

## 2023-01-20 NOTE — ED PROVIDER NOTES
MD ATTESTATION NOTE    The CRIS and I have discussed this patient's history, physical exam, and treatment plan.  I have reviewed the documentation and personally had a face to face interaction with the patient. I affirm the documentation and agree with the treatment and plan.  The attached note describes my personal findings.      I provided a substantive portion of the care of the patient.  I personally performed the physical exam in its entirety, and below are my findings.  For this patient encounter, the patient wore surgical mask, I wore full protective PPE including N95 and eye protection.      Brief HPI: 22-year-old male who sustained a dog bite to his nose earlier this evening while playing with his new puppy.  The dog is up-to-date on vaccination    PHYSICAL EXAM  ED Triage Vitals   Temp Heart Rate Resp BP SpO2   01/19/23 2247 01/19/23 2247 01/19/23 2249 01/19/23 2249 01/19/23 2247   98.2 °F (36.8 °C) 93 18 141/78 97 %      Temp src Heart Rate Source Patient Position BP Location FiO2 (%)   01/19/23 2247 01/19/23 2247 -- -- --   Oral Monitor            GENERAL:*no acute distress  HENT: nares patent, there are 2 puncture wounds/lacerations to the right side of the nasal ala.  There fairly small and I think superficial but the most inferior laceration is about half centimeter long and the skin edges do not very well approximated which could lead to a very poor cosmetic outcome unless there was some loose approximation  EYES: no scleral icterus  CV: regular rhythm, normal rate  RESPIRATORY: normal effort  ABDOMEN: soft  MUSCULOSKELETAL: no deformity  NEURO: alert, moves all extremities, follows commands  PSYCH:  calm, cooperative  SKIN: warm, dry    Vital signs and nursing notes reviewed.        Plan: Please see the PA note for wound repair and this was done very carefully only to achieve loose approximation and try to improve any future cosmetic outcome.  He will be placed on antibiotics and he can be  discharged     Irwin Greenberg MD  01/20/23 3742

## 2023-01-23 RX ORDER — CETIRIZINE HYDROCHLORIDE 10 MG/1
10 TABLET ORAL DAILY
Qty: 30 TABLET | Refills: 1 | Status: SHIPPED | OUTPATIENT
Start: 2023-01-23 | End: 2023-03-01 | Stop reason: SDUPTHER

## 2023-01-27 ENCOUNTER — OFFICE VISIT (OUTPATIENT)
Dept: FAMILY MEDICINE CLINIC | Facility: CLINIC | Age: 23
End: 2023-01-27
Payer: COMMERCIAL

## 2023-01-27 VITALS
BODY MASS INDEX: 31.4 KG/M2 | OXYGEN SATURATION: 97 % | SYSTOLIC BLOOD PRESSURE: 124 MMHG | DIASTOLIC BLOOD PRESSURE: 88 MMHG | TEMPERATURE: 98.9 F | HEIGHT: 69 IN | WEIGHT: 212 LBS | HEART RATE: 81 BPM

## 2023-01-27 DIAGNOSIS — Z48.02 ENCOUNTER FOR REMOVAL OF SUTURES: Primary | ICD-10-CM

## 2023-01-27 PROCEDURE — 99212 OFFICE O/P EST SF 10 MIN: CPT | Performed by: NURSE PRACTITIONER

## 2023-01-27 NOTE — PROGRESS NOTES
"Chief Complaint  Suture / Staple Removal (2 sutures on tip of nose x 7 days)    Subjective        Brandon Marsh presents to [facility] PRIMARY [facility]ARE  History of Present Illness   22-year-old male, patient of Dr. Dodd, new to me, presenting for suture removal.  He presented to [facility] ED on 1/19/2023 related to laceration of nose from dog bite, this was a family dog and up-to-date on shots, he underwent suturing and was sent home on Augmentin with instructions to follow-up with PCP in 1 week for suture removal.     Objective   Vital Signs:  /88 (BP Location: Right arm, Patient Position: Sitting, Cuff Size: Large Adult)   Pulse 81   Temp 98.9 °F (37.2 °C) (Infrared)   Ht 175.3 cm (69\")   Wt 96.2 kg (212 lb)   SpO2 97%   BMI 31.31 kg/m²   Estimated body mass index is 31.31 kg/m² as calculated from the following:    Height as of this encounter: 175.3 cm (69\").    Weight as of this encounter: 96.2 kg (212 lb).             Physical Exam  Cardiovascular:      Rate and Rhythm: Normal rate.      Pulses: Normal pulses.      Heart sounds: Normal heart sounds.   Pulmonary:      Effort: Pulmonary effort is normal.      Breath sounds: Normal breath sounds.   Skin:     Comments: Healed incision to tip of nose C/D/I sutures removed.    Neurological:      General: No focal deficit present.      Mental Status: He is alert and oriented to person, place, and time.        Result Review :            Suture Removal    Date/Time: 1/27/2023 11:03 AM  Performed by: Cristina Gomez APRN  Authorized by: Cristina Gomez APRN   Consent: Verbal consent obtained.  Consent given by: patient  Patient understanding: patient states understanding of the procedure being performed  Patient consent: the patient's understanding of the procedure matches consent given  Patient identity confirmed: verbally with patient  Body area: head/neck  Location details: nose  Wound Appearance: clean  Sutures Removed: " 2  Post-procedure assessment: Betadine applied.  Patient tolerance: patient tolerated the procedure well with no immediate complications  Comments: Healed incision C/D/I             Assessment and Plan   Diagnoses and all orders for this visit:    1. Encounter for removal of sutures (Primary)  Comments:  Keep area C/D/I. Follow-up if opening of wound, redness or drainage.     Other orders  -     Suture Removal             Follow Up   Return if symptoms worsen or fail to improve.  Patient was given instructions and counseling regarding his condition or for health maintenance advice. Please see specific information pulled into the AVS if appropriate.     Follow-up if opening of wound, redness or drainage.  Continue antibiotic therapy until completion.    Mask and gloves worn      Answers for HPI/ROS submitted by the patient on 1/25/2023  What is the primary reason for your visit?: Other  Please describe your symptoms.: Remove stitches  Have you had these symptoms before?: No  How long have you been having these symptoms?: 5-7 days

## 2023-03-01 ENCOUNTER — OFFICE VISIT (OUTPATIENT)
Dept: FAMILY MEDICINE CLINIC | Facility: CLINIC | Age: 23
End: 2023-03-01
Payer: COMMERCIAL

## 2023-03-01 VITALS
OXYGEN SATURATION: 98 % | WEIGHT: 208 LBS | HEART RATE: 83 BPM | SYSTOLIC BLOOD PRESSURE: 120 MMHG | TEMPERATURE: 97.8 F | DIASTOLIC BLOOD PRESSURE: 78 MMHG | HEIGHT: 69 IN | BODY MASS INDEX: 30.81 KG/M2

## 2023-03-01 DIAGNOSIS — E55.9 VITAMIN D DEFICIENCY: ICD-10-CM

## 2023-03-01 DIAGNOSIS — R00.0 TACHYCARDIA: Primary | ICD-10-CM

## 2023-03-01 DIAGNOSIS — Z11.59 NEED FOR HEPATITIS C SCREENING TEST: ICD-10-CM

## 2023-03-01 DIAGNOSIS — D56.3 ALPHA THALASSEMIA TRAIT: ICD-10-CM

## 2023-03-01 DIAGNOSIS — K20.90 ESOPHAGITIS DETERMINED BY BIOPSY: ICD-10-CM

## 2023-03-01 DIAGNOSIS — J45.909 ASTHMA, UNSPECIFIED ASTHMA SEVERITY, UNSPECIFIED WHETHER COMPLICATED, UNSPECIFIED WHETHER PERSISTENT: ICD-10-CM

## 2023-03-01 DIAGNOSIS — Z00.00 ROUTINE HEALTH MAINTENANCE: ICD-10-CM

## 2023-03-01 DIAGNOSIS — E66.9 OBESITY (BMI 30-39.9): ICD-10-CM

## 2023-03-01 DIAGNOSIS — R53.81 PHYSICAL DECONDITIONING: ICD-10-CM

## 2023-03-01 DIAGNOSIS — Z13.1 SCREENING FOR DIABETES MELLITUS: ICD-10-CM

## 2023-03-01 PROBLEM — U07.1 COVID-19 VIRUS DETECTED: Status: RESOLVED | Noted: 2022-12-22 | Resolved: 2023-03-01

## 2023-03-01 PROBLEM — K29.61: Status: RESOLVED | Noted: 2022-08-31 | Resolved: 2023-03-01

## 2023-03-01 PROCEDURE — 99214 OFFICE O/P EST MOD 30 MIN: CPT | Performed by: INTERNAL MEDICINE

## 2023-03-01 RX ORDER — LEVALBUTEROL TARTRATE 45 UG/1
1-2 AEROSOL, METERED ORAL EVERY 4 HOURS PRN
Qty: 15 G | Refills: 6 | Status: SHIPPED | OUTPATIENT
Start: 2023-03-01

## 2023-03-01 RX ORDER — CETIRIZINE HYDROCHLORIDE 10 MG/1
10 TABLET ORAL DAILY
Qty: 90 TABLET | Refills: 3 | Status: SHIPPED | OUTPATIENT
Start: 2023-03-01

## 2023-03-01 RX ORDER — LANSOPRAZOLE 30 MG/1
30 CAPSULE, DELAYED RELEASE ORAL DAILY
Qty: 90 CAPSULE | Refills: 3 | Status: SHIPPED | OUTPATIENT
Start: 2023-03-01

## 2023-03-01 RX ORDER — MONTELUKAST SODIUM 10 MG/1
10 TABLET ORAL
Qty: 90 TABLET | Refills: 3 | Status: SHIPPED | OUTPATIENT
Start: 2023-03-01

## 2023-03-01 RX ORDER — LEVALBUTEROL INHALATION SOLUTION 0.63 MG/3ML
1 SOLUTION RESPIRATORY (INHALATION) EVERY 4 HOURS PRN
Qty: 180 ML | Refills: 5 | Status: SHIPPED | OUTPATIENT
Start: 2023-03-01

## 2023-03-01 NOTE — ASSESSMENT & PLAN NOTE
Discussed recommendations for at least 150 minutes of exercise per week.  I told patient he did not need to go out and join a gym, but I did recommend getting outside and walking at least 30 minutes a day 5 days a week.

## 2023-03-01 NOTE — PROGRESS NOTES
"Chief Complaint  Establish Care and Annual Exam    Subjective          Brandon presents to [facility] for for a new patient visit. He previously was cared for by another provider in our system, but now will be transferring care to our practice.  He is accompanied by his mother for this visit.  I gave him the option of doing this visit alone, but he said he was okay with this mom being in the room and deferred to her for many questions.    His father is concerned due to episodes of elevated blood pressure as well as an episode of tachycardia with minimal exertion. His father was so concerned that he wanted the patient to go to the emergency department due to, at least according to my understanding, diastolic blood pressure of 100.  Context provided by patient's mother: His father had a traumatic episode with a friend's death, friend  after having L shoulder pain. When pt had \"top of arm pain\" last night, pt's dad checked the pt's heart rate.  Heart rate was reportedly 137 after walking up a flight of 12 stairs. Blood pressure was also elevated, although they are not sure how high. Family is concerned about SEs from COVID vaccine. Pt's father is seemingly more worried than pt is about it. No CHAMBERLAIN, chest pain, LE edema. He has gained some weight with COVID, etc. He does not work, is not currently in school, does not exercise, mostly is sedentary.  Denies any issues with mood or depression.    He does have a FH of VWD and has alpha thal trait (unaffected carrier). He does not have VWD.     He has asthma thought related to history of severe acid reflux as a child (almost needed a G-tube). Sxs well-controlled with prn Xopenex. EGD in 2022 with evidence of severe esophagitis but less likely eosinophilic esophagitis.  Symptoms well-controlled on PPI.    FH: Grandpa and sister had colon cancer. Grandfather had bladder cancer. Strong family history of breast cancer but not in first-degree " "relative.    Used to be on ADHD medication. Prev on Concerta, Focalin, Strattera. Got many symptoms on meds, especially tics with the medications. Declines depression but his best friend passed away from unknown heart condition in fall 2022    COVID vaccine x 2  Tdap UTD    Re lifestyle, when I asked \"Do you eat healthfully\" he said \"not exactly\" but pt unable to elaborate. Mom trying to adhere to DASH diet when cooking, has cut out red meats etc.     Objective   Vital Signs:   Vitals:    03/01/23 1353   BP: 120/78   Pulse: 83   Temp: 97.8 °F (36.6 °C)   SpO2: 98%   Weight: 94.3 kg (208 lb)   Height: 175.3 cm (69.02\")                Physical Exam  Constitutional:       General: He is not in acute distress.     Appearance: Normal appearance. He is not toxic-appearing.      Comments: Overweight   HENT:      Head: Normocephalic and atraumatic.      Mouth/Throat:      Mouth: Mucous membranes are moist.   Eyes:      General: No scleral icterus.     Conjunctiva/sclera: Conjunctivae normal.   Cardiovascular:      Rate and Rhythm: Normal rate and regular rhythm.      Heart sounds: Normal heart sounds. No murmur heard.    No friction rub. No gallop.      Comments: Apical rate 80  Pulmonary:      Effort: Pulmonary effort is normal. No respiratory distress.      Breath sounds: Normal breath sounds.   Musculoskeletal:         General: No swelling, tenderness or deformity. Normal range of motion.      Cervical back: Normal range of motion and neck supple.   Skin:     General: Skin is warm and dry.      Findings: No lesion or rash.   Neurological:      General: No focal deficit present.      Mental Status: He is alert and oriented to person, place, and time.   Psychiatric:         Mood and Affect: Mood normal.         Behavior: Behavior normal.         Judgment: Judgment normal.      Comments: Poor eye contact at times          Result Review :     The following data was reviewed by: Pamela Ojeda MD on 03/01/2023:    Tissue " Pathology Exam (11/17/2022 11:13)  Progress Notes by Gus Funes MD (09/30/2022 10:00)           Assessment and Plan    Diagnoses and all orders for this visit:    1. Tachycardia (Primary)  Assessment & Plan:  In discussion with the patient, it sounds like patient's father is more concerned about this and the patient is.  The patient denies any symptoms of a cardiomyopathy.  His pulse rate in the clinic and his cardiac exam are normal.  It could be that he was having tachycardia due to deconditioning, as he is very sedentary.  I gave him the option of working it up with an EKG and labs but he stated that he would like to hold off on EKG.  However, after we adjourned the visit, they decided that they did want an EKG. When our medical assistant went looking for the patient after his visit in order to get EKG, she was unable to locate the patient.      Orders:  -     ECG 12 Lead    2. Need for hepatitis C screening test  -     Hepatitis C antibody    3. Alpha thalassemia trait  Assessment & Plan:  Unaffected carrier    Orders:  -     CBC w AUTO Differential; Future    4. Asthma, unspecified asthma severity, unspecified whether complicated, unspecified whether persistent  Assessment & Plan:  His mother states that he had lung damage from severe chronic reflux as a young child.  Normal pulmonary exam, symptoms very intermittent at this point.  Continue as needed Xopenex.  Refilled.          5. Esophagitis determined by biopsy  Assessment & Plan:  Has history of longstanding reflux.  Pathology from EGD in November 2022 showed concern for chronic gastritis and possibly even eosinophilic esophagitis.  Continue PPI, follow-up with GI.      6. Vitamin D deficiency  Assessment & Plan:  Check vitamin D level    Orders:  -     Vitamin D 25 hydroxy    7. Routine health maintenance    8. Screening for diabetes mellitus  -     Hemoglobin A1c    9. Physical deconditioning  Assessment & Plan:  Discussed recommendations for at least  150 minutes of exercise per week.  I told patient he did not need to go out and join a gym, but I did recommend getting outside and walking at least 30 minutes a day 5 days a week.      10. Obesity (BMI 30-39.9)  Assessment & Plan:  Lipid panel within the last year was normal.  Check A1c.  Discussed diet and exercise modifications.      Other orders  -     montelukast (SINGULAIR) 10 MG tablet; Take 1 tablet by mouth every night at bedtime.  Dispense: 90 tablet; Refill: 3  -     levalbuterol (XOPENEX) 0.63 MG/3ML nebulizer solution; Take 1 ampule by nebulization Every 4 (Four) Hours As Needed for Wheezing or Shortness of Air.  Dispense: 180 mL; Refill: 5  -     levalbuterol (XOPENEX HFA) 45 MCG/ACT inhaler; Inhale 1-2 puffs Every 4 (Four) Hours As Needed for Wheezing.  Dispense: 15 g; Refill: 6  -     lansoprazole (Prevacid) 30 MG capsule; Take 1 capsule by mouth Daily.  Dispense: 90 capsule; Refill: 3  -     cetirizine (zyrTEC) 10 MG tablet; Take 1 tablet by mouth Daily.  Dispense: 90 tablet; Refill: 3      Follow Up   Return in 1 year (on 3/1/2024), or if symptoms worsen or fail to improve.  Patient was given instructions and counseling regarding his condition or for health maintenance advice. Please see specific information pulled into the AVS if appropriate.

## 2023-03-01 NOTE — ASSESSMENT & PLAN NOTE
His mother states that he had lung damage from severe chronic reflux as a young child.  Normal pulmonary exam, symptoms very intermittent at this point.  Continue as needed Xopenex.  Refilled.

## 2023-03-01 NOTE — ASSESSMENT & PLAN NOTE
In discussion with the patient, it sounds like patient's father is more concerned about this and the patient is.  The patient denies any symptoms of a cardiomyopathy.  His pulse rate in the clinic and his cardiac exam are normal.  It could be that he was having tachycardia due to deconditioning, as he is very sedentary.  I gave him the option of working it up with an EKG and labs but he stated that he would like to hold off on EKG.  However, after we adjourned the visit, they decided that they did want an EKG. When our medical assistant went looking for the patient after his visit in order to get EKG, she was unable to locate the patient.

## 2023-03-01 NOTE — ASSESSMENT & PLAN NOTE
Has history of longstanding reflux.  Pathology from EGD in November 2022 showed concern for chronic gastritis and possibly even eosinophilic esophagitis.  Continue PPI, follow-up with GI.

## 2023-03-01 NOTE — ASSESSMENT & PLAN NOTE
Lipid panel within the last year was normal.  Check A1c.  Discussed diet and exercise modifications.

## 2023-03-02 ENCOUNTER — PATIENT ROUNDING (BHMG ONLY) (OUTPATIENT)
Dept: FAMILY MEDICINE CLINIC | Facility: CLINIC | Age: 23
End: 2023-03-02
Payer: COMMERCIAL

## 2023-03-02 LAB
25(OH)D3+25(OH)D2 SERPL-MCNC: 40.6 NG/ML (ref 30–100)
HBA1C MFR BLD: 5.5 % (ref 4.8–5.6)
HCV IGG SERPL QL IA: NON REACTIVE

## 2023-03-02 NOTE — PROGRESS NOTES
A [employer] message has been sent to patient for PATIENT ROUNDING for [facility].

## 2023-04-10 RX ORDER — CETIRIZINE HYDROCHLORIDE 10 MG/1
10 TABLET ORAL DAILY
Qty: 90 TABLET | Refills: 3 | Status: SHIPPED | OUTPATIENT
Start: 2023-04-10

## 2023-05-01 RX ORDER — EPINEPHRINE 0.3 MG/.3ML
0.3 INJECTION SUBCUTANEOUS
Qty: 1 EACH | OUTPATIENT
Start: 2023-05-01

## 2023-05-01 RX ORDER — LEVALBUTEROL INHALATION SOLUTION 0.63 MG/3ML
1 SOLUTION RESPIRATORY (INHALATION) EVERY 4 HOURS PRN
Qty: 180 ML | Refills: 5 | OUTPATIENT
Start: 2023-05-01

## 2023-05-01 RX ORDER — LEVALBUTEROL TARTRATE 45 UG/1
1-2 AEROSOL, METERED ORAL EVERY 4 HOURS PRN
Qty: 15 G | Refills: 6 | OUTPATIENT
Start: 2023-05-01

## 2023-05-01 NOTE — TELEPHONE ENCOUNTER
Rx Refill Note  Requested Prescriptions     Pending Prescriptions Disp Refills   • EPINEPHrine (EPIPEN) 0.3 MG/0.3ML solution auto-injector injection 1 each      Sig: Inject 0.3 mL into the appropriate muscle as directed by prescriber.   • levalbuterol (XOPENEX) 0.63 MG/3ML nebulizer solution 180 mL 5     Sig: Take 1 ampule by nebulization Every 4 (Four) Hours As Needed for Wheezing or Shortness of Air.   • levalbuterol (XOPENEX HFA) 45 MCG/ACT inhaler 15 g 6     Sig: Inhale 1-2 puffs Every 4 (Four) Hours As Needed for Wheezing.      Last office visit with prescribing clinician: 3/1/2023   Last telemedicine visit with prescribing clinician: Visit date not found   Next office visit with prescribing clinician: 3/4/2024   {

## 2023-08-14 RX ORDER — EPINEPHRINE 0.3 MG/.3ML
0.3 INJECTION SUBCUTANEOUS ONCE
Qty: 1 EACH | Refills: 0 | Status: SHIPPED | OUTPATIENT
Start: 2023-08-14 | End: 2023-08-14

## 2023-08-14 NOTE — TELEPHONE ENCOUNTER
Rx Refill Note  Requested Prescriptions     Pending Prescriptions Disp Refills    EPINEPHrine (EPIPEN) 0.3 MG/0.3ML solution auto-injector injection 1 each      Sig: Inject 0.3 mL into the appropriate muscle as directed by prescriber.      Last office visit with prescribing clinician: 3/1/2023   Last telemedicine visit with prescribing clinician: Visit date not found   Next office visit with prescribing clinician: 3/5/2024                         Would you like a call back once the refill request has been completed: [] Yes [] No    If the office needs to give you a call back, can they leave a voicemail: [] Yes [] No    Otoniel Cochran MA  08/14/23, 11:10 EDT

## 2023-09-25 NOTE — PROGRESS NOTES
"Chief Complaint  Annual Exam    Subjective        HPI   Brandon presents to [facility] for a physical. He is accompanied by his mother. Offered to do with visit without her in the room but pt preferred she stay in.    At last visit, pt and his father were concerned for \"tachycardia\" that was noted at home. Vitals were normal in office. No further issues.    Diabetes screening: Neg 3/2023  Lipid screening: Today  Lung cancer screening: n/a  Colon cancer screening: No first degree relatives w colon CA, start age 45  Prostate cancer screening: N/a  Discussed recommendation for yearly eye and dental exams. UTD. Has eye doc appt upcoming  Does not use tobacco or ETOH  Discussed recommendation for seatbelt and sunscreen use     Vaccines: Declines COVID vaccine. Flu vaccine today. All other vaccines UTD.  Not exercising regularly but now has a dog. Cut down on red meat. Occ sugary beverages.   Going to work at [employer] soon     Objective   Vital Signs:   Vitals:    10/02/23 0820   BP: 124/84   BP Location: Left arm   Patient Position: Sitting   Cuff Size: Large Adult   Pulse: 84   SpO2: 98%   Weight: 100 kg (220 lb 14.4 oz)   Height: 175.3 cm (69.02\")         Physical Exam  Constitutional:       General: He is not in acute distress.     Appearance: Normal appearance. He is not toxic-appearing.   HENT:      Head: Normocephalic and atraumatic.      Mouth/Throat:      Mouth: Mucous membranes are moist.   Eyes:      General: No scleral icterus.     Conjunctiva/sclera: Conjunctivae normal.   Cardiovascular:      Rate and Rhythm: Normal rate and regular rhythm.      Heart sounds: Normal heart sounds. No murmur heard.    No friction rub. No gallop.   Pulmonary:      Effort: Pulmonary effort is normal. No respiratory distress.      Breath sounds: Normal breath sounds.   Abdominal:      General: Bowel sounds are normal. There is no distension.      Palpations: Abdomen is soft.      Tenderness: There is no " abdominal tenderness.      Hernia: No hernia is present.   Musculoskeletal:         General: No swelling, tenderness or deformity. Normal range of motion.      Cervical back: Normal range of motion and neck supple.   Skin:     General: Skin is warm and dry.      Findings: No lesion or rash.   Neurological:      General: No focal deficit present.      Mental Status: He is alert and oriented to person, place, and time.   Psychiatric:         Mood and Affect: Mood normal.         Behavior: Behavior normal.         Judgment: Judgment normal.        Result Review :                Assessment and Plan    Diagnoses and all orders for this visit:    1. Annual physical exam (Primary)    2. Obesity (BMI 30-39.9)  Assessment & Plan:  Patient's (Body mass index is 32.6 kg/m².) indicates that they are obese (BMI >30) with health conditions that include none . Weight is unchanged. BMI  is above average; BMI management plan is completed. We discussed increasing exercise and decreasing sugary beverages/processed foods and increasing lean meats, fiber, fruits, and vegetables in diet .     Orders:  -     Lipid Panel  -     Hemoglobin A1c    3. Vitamin D deficiency  Assessment & Plan:  Vit D level today    Orders:  -     Vitamin D 25 hydroxy    4. Alpha thalassemia trait  Assessment & Plan:  CBC today    Orders:  -     CBC (No Diff)    5. Screening for diabetes mellitus  -     Hemoglobin A1c    6. Screening for lipid disorders  -     Lipid Panel    7. Asthma, unspecified asthma severity, unspecified whether complicated, unspecified whether persistent  Assessment & Plan:  Con't prn Xopenex, Zyrtec, Singulair, sxs controlled. Thought related to severe chronic reflux when pt was younger.     Orders:  -     montelukast (SINGULAIR) 10 MG tablet; Take 1 tablet by mouth every night at bedtime.  Dispense: 90 tablet; Refill: 3  -     levalbuterol (XOPENEX HFA) 45 MCG/ACT inhaler; Inhale 1-2 puffs Every 4 (Four) Hours As Needed for Wheezing.   Dispense: 15 g; Refill: 6  -     cetirizine (zyrTEC) 10 MG tablet; Take 1 tablet by mouth Daily.  Dispense: 90 tablet; Refill: 3    8. Esophagitis determined by biopsy  Assessment & Plan:  Con't PPI as needed    Orders:  -     lansoprazole (Prevacid) 30 MG capsule; Take 1 capsule by mouth Daily.  Dispense: 90 capsule; Refill: 3    9. Routine health maintenance  Assessment & Plan:  Diabetes screening: Neg 3/2023  Lipid screening: Today  Lung cancer screening: n/a  Colon cancer screening: No first degree relatives w colon CA, start age 45  Prostate cancer screening: N/a  Discussed recommendation for yearly eye and dental exams. UTD. Has eye doc appt upcoming  Does not use tobacco or ETOH  Discussed recommendation for seatbelt and sunscreen use     Vaccines: Declines COVID vaccine. Flu vaccine today. All other vaccines UTD.  Not exercising regularly but now has a dog. Cut down on red meat. Occ sugary beverages.       Other orders  -     Fluzone >6 Months ([id number])    Really recommended increasing physical activity (150 min per week), getting outside, and cutting out processed foods. Pt denies problems with anxiety and depression. Starting new job soon.       Follow Up   Return in about 1 year (around 10/2/2024) for Annual physical.  Patient was given instructions and counseling regarding his condition or for health maintenance advice. Please see specific information pulled into the AVS if appropriate.

## 2023-10-02 ENCOUNTER — OFFICE VISIT (OUTPATIENT)
Dept: FAMILY MEDICINE CLINIC | Facility: CLINIC | Age: 23
End: 2023-10-02
Payer: COMMERCIAL

## 2023-10-02 VITALS
BODY MASS INDEX: 32.72 KG/M2 | HEIGHT: 69 IN | WEIGHT: 220.9 LBS | SYSTOLIC BLOOD PRESSURE: 124 MMHG | OXYGEN SATURATION: 98 % | DIASTOLIC BLOOD PRESSURE: 84 MMHG | HEART RATE: 84 BPM

## 2023-10-02 DIAGNOSIS — K20.90 ESOPHAGITIS DETERMINED BY BIOPSY: ICD-10-CM

## 2023-10-02 DIAGNOSIS — J45.909 ASTHMA, UNSPECIFIED ASTHMA SEVERITY, UNSPECIFIED WHETHER COMPLICATED, UNSPECIFIED WHETHER PERSISTENT: ICD-10-CM

## 2023-10-02 DIAGNOSIS — Z13.1 SCREENING FOR DIABETES MELLITUS: ICD-10-CM

## 2023-10-02 DIAGNOSIS — E66.9 OBESITY (BMI 30-39.9): ICD-10-CM

## 2023-10-02 DIAGNOSIS — Z13.220 SCREENING FOR LIPID DISORDERS: ICD-10-CM

## 2023-10-02 DIAGNOSIS — Z00.00 ROUTINE HEALTH MAINTENANCE: ICD-10-CM

## 2023-10-02 DIAGNOSIS — E55.9 VITAMIN D DEFICIENCY: ICD-10-CM

## 2023-10-02 DIAGNOSIS — Z00.00 ANNUAL PHYSICAL EXAM: Primary | ICD-10-CM

## 2023-10-02 DIAGNOSIS — D56.3 ALPHA THALASSEMIA TRAIT: ICD-10-CM

## 2023-10-02 PROBLEM — R00.0 TACHYCARDIA: Status: RESOLVED | Noted: 2023-03-01 | Resolved: 2023-10-02

## 2023-10-02 RX ORDER — CETIRIZINE HYDROCHLORIDE 10 MG/1
10 TABLET ORAL DAILY
Qty: 90 TABLET | Refills: 3 | Status: SHIPPED | OUTPATIENT
Start: 2023-10-02

## 2023-10-02 RX ORDER — LANSOPRAZOLE 30 MG/1
30 CAPSULE, DELAYED RELEASE ORAL DAILY
Qty: 90 CAPSULE | Refills: 3 | Status: SHIPPED | OUTPATIENT
Start: 2023-10-02 | End: 2023-10-03

## 2023-10-02 RX ORDER — LEVALBUTEROL TARTRATE 45 UG/1
1-2 AEROSOL, METERED ORAL EVERY 4 HOURS PRN
Qty: 15 G | Refills: 6 | Status: SHIPPED | OUTPATIENT
Start: 2023-10-02

## 2023-10-02 RX ORDER — MONTELUKAST SODIUM 10 MG/1
10 TABLET ORAL
Qty: 90 TABLET | Refills: 3 | Status: SHIPPED | OUTPATIENT
Start: 2023-10-02

## 2023-10-02 NOTE — ASSESSMENT & PLAN NOTE
Diabetes screening: Neg 3/2023  Lipid screening: Today  Lung cancer screening: n/a  Colon cancer screening: No first degree relatives w colon CA, start age 45  Prostate cancer screening: N/a  Discussed recommendation for yearly eye and dental exams. UTD. Has eye doc appt upcoming  Does not use tobacco or ETOH  Discussed recommendation for seatbelt and sunscreen use     Vaccines: Declines COVID vaccine. Flu vaccine today. All other vaccines UTD.  Not exercising regularly but now has a dog. Cut down on red meat. Occ sugary beverages.    Procedure: right internal jugular vein Triple Lumen Catheter placement. Indications: vascular access    Consent: Unable to be obtained due to the emergent nature of this procedure. Number of sticks: 1    Number of Kits used: 1    Procedure: The patient was place in the trendelenburg position and the skin over the right internal jugular vein was prepped with Chloraprep. Local anesthesia was not performed due to the emergent nature of this procedure. With Ultrasound guidance a large bore needle was used to identify the vein, dark non pulsatile blood returned. The guide wire was then inserted through the needle with minimal resistance. 2 mm nick was made in the skin beside the guidewire. Then a dilator was inserted and removed. A triple lumen catheter was then inserted into the vessel over the guide wire using the Seldinger technique to the 15 cm herbie. All ports showed good, free flowing blood return and were flushed with saline solution. The catheter was then securely fastened to the skin with sutures and covered with a bio patch and sterile dressing. A post procedure X-ray was ordered and showed good line position. Complications: None   The patient tolerated the procedure well. Estimated blood loss: 1 ml.     Tom Dash MD PGY-3  4/4/2020  10:21 PM

## 2023-10-02 NOTE — ASSESSMENT & PLAN NOTE
Con't prn Xopenex, Zyrtec, Singulair, sxs controlled. Thought related to severe chronic reflux when pt was younger.

## 2023-10-02 NOTE — PATIENT INSTRUCTIONS
Exercise 30 minutes 5 times per week!  Be sure to keep sugary beverages, greasy foods, processed foods to a minimum. More veggies, high fiber foods, fruits, lean meats, etc!

## 2023-10-02 NOTE — ASSESSMENT & PLAN NOTE
Patient's (Body mass index is 32.6 kg/m².) indicates that they are obese (BMI >30) with health conditions that include none . Weight is unchanged. BMI  is above average; BMI management plan is completed. We discussed increasing exercise and decreasing sugary beverages/processed foods and increasing lean meats, fiber, fruits, and vegetables in diet .

## 2023-10-03 DIAGNOSIS — K20.90 ESOPHAGITIS DETERMINED BY BIOPSY: ICD-10-CM

## 2023-10-03 LAB
25(OH)D3+25(OH)D2 SERPL-MCNC: 24.7 NG/ML (ref 30–100)
CHOLEST SERPL-MCNC: 144 MG/DL (ref 0–200)
ERYTHROCYTE [DISTWIDTH] IN BLOOD BY AUTOMATED COUNT: 13.2 % (ref 12.3–15.4)
HBA1C MFR BLD: 5.6 % (ref 4.8–5.6)
HCT VFR BLD AUTO: 48.4 % (ref 37.5–51)
HDLC SERPL-MCNC: 36 MG/DL (ref 40–60)
HGB BLD-MCNC: 15.9 G/DL (ref 13–17.7)
LDLC SERPL CALC-MCNC: 85 MG/DL (ref 0–100)
MCH RBC QN AUTO: 26.2 PG (ref 26.6–33)
MCHC RBC AUTO-ENTMCNC: 32.9 G/DL (ref 31.5–35.7)
MCV RBC AUTO: 79.9 FL (ref 79–97)
PLATELET # BLD AUTO: 240 10*3/MM3 (ref 140–450)
RBC # BLD AUTO: 6.06 10*6/MM3 (ref 4.14–5.8)
TRIGL SERPL-MCNC: 131 MG/DL (ref 0–150)
VLDLC SERPL CALC-MCNC: 23 MG/DL (ref 5–40)
WBC # BLD AUTO: 6.89 10*3/MM3 (ref 3.4–10.8)

## 2023-10-03 RX ORDER — LANSOPRAZOLE 30 MG/1
30 CAPSULE, DELAYED RELEASE ORAL DAILY
Qty: 90 CAPSULE | Refills: 3 | Status: SHIPPED | OUTPATIENT
Start: 2023-10-03

## 2023-12-02 ENCOUNTER — APPOINTMENT (OUTPATIENT)
Dept: CT IMAGING | Facility: HOSPITAL | Age: 23
End: 2023-12-02
Payer: COMMERCIAL

## 2023-12-02 ENCOUNTER — HOSPITAL ENCOUNTER (OUTPATIENT)
Facility: HOSPITAL | Age: 23
Setting detail: OBSERVATION
Discharge: HOME | End: 2023-12-03
Attending: EMERGENCY MEDICINE | Admitting: INTERNAL MEDICINE
Payer: COMMERCIAL

## 2023-12-02 ENCOUNTER — APPOINTMENT (OUTPATIENT)
Dept: MRI IMAGING | Facility: HOSPITAL | Age: 23
End: 2023-12-02
Payer: COMMERCIAL

## 2023-12-02 ENCOUNTER — APPOINTMENT (OUTPATIENT)
Dept: NEUROLOGY | Facility: HOSPITAL | Age: 23
End: 2023-12-02
Payer: COMMERCIAL

## 2023-12-02 DIAGNOSIS — R56.9 NEW ONSET SEIZURE: Primary | ICD-10-CM

## 2023-12-02 LAB
ALBUMIN SERPL-MCNC: 4.4 G/DL (ref 3.5–5.2)
ALBUMIN/GLOB SERPL: 1.5 G/DL
ALP SERPL-CCNC: 82 U/L (ref 39–117)
ALT SERPL W P-5'-P-CCNC: 38 U/L (ref 1–41)
AMPHET+METHAMPHET UR QL: NEGATIVE
ANION GAP SERPL CALCULATED.3IONS-SCNC: 9.3 MMOL/L (ref 5–15)
ANION GAP SERPL CALCULATED.3IONS-SCNC: 9.4 MMOL/L (ref 5–15)
AST SERPL-CCNC: 29 U/L (ref 1–40)
BARBITURATES UR QL SCN: NEGATIVE
BASOPHILS # BLD AUTO: 0.12 10*3/MM3 (ref 0–0.2)
BASOPHILS NFR BLD AUTO: 1.1 % (ref 0–1.5)
BENZODIAZ UR QL SCN: NEGATIVE
BILIRUB SERPL-MCNC: 0.3 MG/DL (ref 0–1.2)
BILIRUB UR QL STRIP: NEGATIVE
BUN SERPL-MCNC: 8 MG/DL (ref 6–20)
BUN SERPL-MCNC: 9 MG/DL (ref 6–20)
BUN/CREAT SERPL: 10.1 (ref 7–25)
BUN/CREAT SERPL: 9.3 (ref 7–25)
CALCIUM SPEC-SCNC: 8.7 MG/DL (ref 8.6–10.5)
CALCIUM SPEC-SCNC: 9.4 MG/DL (ref 8.6–10.5)
CANNABINOIDS SERPL QL: NEGATIVE
CHLORIDE SERPL-SCNC: 106 MMOL/L (ref 98–107)
CHLORIDE SERPL-SCNC: 107 MMOL/L (ref 98–107)
CK SERPL-CCNC: 219 U/L (ref 20–200)
CK SERPL-CCNC: 234 U/L (ref 20–200)
CLARITY UR: CLEAR
CO2 SERPL-SCNC: 23.6 MMOL/L (ref 22–29)
CO2 SERPL-SCNC: 24.7 MMOL/L (ref 22–29)
COCAINE UR QL: NEGATIVE
COLOR UR: YELLOW
CREAT SERPL-MCNC: 0.86 MG/DL (ref 0.76–1.27)
CREAT SERPL-MCNC: 0.89 MG/DL (ref 0.76–1.27)
DEPRECATED RDW RBC AUTO: 36.4 FL (ref 37–54)
DEPRECATED RDW RBC AUTO: 36.8 FL (ref 37–54)
EGFRCR SERPLBLD CKD-EPI 2021: 122 ML/MIN/1.73
EGFRCR SERPLBLD CKD-EPI 2021: 123.2 ML/MIN/1.73
EOSINOPHIL # BLD AUTO: 0.44 10*3/MM3 (ref 0–0.4)
EOSINOPHIL NFR BLD AUTO: 4 % (ref 0.3–6.2)
ERYTHROCYTE [DISTWIDTH] IN BLOOD BY AUTOMATED COUNT: 12.9 % (ref 12.3–15.4)
ERYTHROCYTE [DISTWIDTH] IN BLOOD BY AUTOMATED COUNT: 12.9 % (ref 12.3–15.4)
ETHANOL BLD-MCNC: <10 MG/DL (ref 0–10)
ETHANOL UR QL: <0.01 %
FENTANYL UR-MCNC: NEGATIVE NG/ML
FOLATE SERPL-MCNC: 9.15 NG/ML (ref 4.78–24.2)
GEN 5 2HR TROPONIN T REFLEX: 12 NG/L
GLOBULIN UR ELPH-MCNC: 2.9 GM/DL
GLUCOSE SERPL-MCNC: 106 MG/DL (ref 65–99)
GLUCOSE SERPL-MCNC: 123 MG/DL (ref 65–99)
GLUCOSE UR STRIP-MCNC: NEGATIVE MG/DL
HBA1C MFR BLD: 5.3 % (ref 4.8–5.6)
HCT VFR BLD AUTO: 41.5 % (ref 37.5–51)
HCT VFR BLD AUTO: 45.2 % (ref 37.5–51)
HGB BLD-MCNC: 13.7 G/DL (ref 13–17.7)
HGB BLD-MCNC: 14.9 G/DL (ref 13–17.7)
HGB UR QL STRIP.AUTO: NEGATIVE
IMM GRANULOCYTES # BLD AUTO: 0.31 10*3/MM3 (ref 0–0.05)
IMM GRANULOCYTES NFR BLD AUTO: 2.8 % (ref 0–0.5)
INR PPP: 1.12 (ref 0.9–1.1)
KETONES UR QL STRIP: ABNORMAL
LEUKOCYTE ESTERASE UR QL STRIP.AUTO: NEGATIVE
LIPASE SERPL-CCNC: 17 U/L (ref 13–60)
LYMPHOCYTES # BLD AUTO: 3.43 10*3/MM3 (ref 0.7–3.1)
LYMPHOCYTES NFR BLD AUTO: 31.4 % (ref 19.6–45.3)
MAGNESIUM SERPL-MCNC: 2.3 MG/DL (ref 1.6–2.6)
MCH RBC QN AUTO: 26.3 PG (ref 26.6–33)
MCH RBC QN AUTO: 26.4 PG (ref 26.6–33)
MCHC RBC AUTO-ENTMCNC: 33 G/DL (ref 31.5–35.7)
MCHC RBC AUTO-ENTMCNC: 33 G/DL (ref 31.5–35.7)
MCV RBC AUTO: 79.8 FL (ref 79–97)
MCV RBC AUTO: 80 FL (ref 79–97)
METHADONE UR QL SCN: NEGATIVE
MONOCYTES # BLD AUTO: 0.93 10*3/MM3 (ref 0.1–0.9)
MONOCYTES NFR BLD AUTO: 8.5 % (ref 5–12)
NEUTROPHILS NFR BLD AUTO: 5.7 10*3/MM3 (ref 1.7–7)
NEUTROPHILS NFR BLD AUTO: 52.2 % (ref 42.7–76)
NITRITE UR QL STRIP: NEGATIVE
NRBC BLD AUTO-RTO: 0 /100 WBC (ref 0–0.2)
OPIATES UR QL: POSITIVE
OXYCODONE UR QL SCN: NEGATIVE
PH UR STRIP.AUTO: 7 [PH] (ref 5–8)
PLATELET # BLD AUTO: 215 10*3/MM3 (ref 140–450)
PLATELET # BLD AUTO: 234 10*3/MM3 (ref 140–450)
PMV BLD AUTO: 11.3 FL (ref 6–12)
PMV BLD AUTO: 11.7 FL (ref 6–12)
POTASSIUM SERPL-SCNC: 3.6 MMOL/L (ref 3.5–5.2)
POTASSIUM SERPL-SCNC: 3.8 MMOL/L (ref 3.5–5.2)
PROT SERPL-MCNC: 7.3 G/DL (ref 6–8.5)
PROT UR QL STRIP: ABNORMAL
PROTHROMBIN TIME: 14.5 SECONDS (ref 11.7–14.2)
QT INTERVAL: 329 MS
QTC INTERVAL: 410 MS
RBC # BLD AUTO: 5.2 10*6/MM3 (ref 4.14–5.8)
RBC # BLD AUTO: 5.65 10*6/MM3 (ref 4.14–5.8)
SODIUM SERPL-SCNC: 139 MMOL/L (ref 136–145)
SODIUM SERPL-SCNC: 141 MMOL/L (ref 136–145)
SP GR UR STRIP: 1.02 (ref 1–1.03)
T4 FREE SERPL-MCNC: 1.16 NG/DL (ref 0.93–1.7)
TROPONIN T DELTA: 5 NG/L
TROPONIN T SERPL HS-MCNC: 7 NG/L
TSH SERPL DL<=0.05 MIU/L-ACNC: 1.96 UIU/ML (ref 0.27–4.2)
UROBILINOGEN UR QL STRIP: ABNORMAL
VIT B12 BLD-MCNC: 618 PG/ML (ref 211–946)
WBC NRBC COR # BLD AUTO: 10.93 10*3/MM3 (ref 3.4–10.8)
WBC NRBC COR # BLD AUTO: 11.06 10*3/MM3 (ref 3.4–10.8)

## 2023-12-02 PROCEDURE — 85027 COMPLETE CBC AUTOMATED: CPT | Performed by: NURSE PRACTITIONER

## 2023-12-02 PROCEDURE — 93010 ELECTROCARDIOGRAM REPORT: CPT | Performed by: INTERNAL MEDICINE

## 2023-12-02 PROCEDURE — G0378 HOSPITAL OBSERVATION PER HR: HCPCS

## 2023-12-02 PROCEDURE — 82077 ASSAY SPEC XCP UR&BREATH IA: CPT | Performed by: EMERGENCY MEDICINE

## 2023-12-02 PROCEDURE — 80307 DRUG TEST PRSMV CHEM ANLYZR: CPT | Performed by: EMERGENCY MEDICINE

## 2023-12-02 PROCEDURE — 95816 EEG AWAKE AND DROWSY: CPT

## 2023-12-02 PROCEDURE — 72131 CT LUMBAR SPINE W/O DYE: CPT

## 2023-12-02 PROCEDURE — 95819 EEG AWAKE AND ASLEEP: CPT | Performed by: PSYCHIATRY & NEUROLOGY

## 2023-12-02 PROCEDURE — A9577 INJ MULTIHANCE: HCPCS | Performed by: INTERNAL MEDICINE

## 2023-12-02 PROCEDURE — 96361 HYDRATE IV INFUSION ADD-ON: CPT

## 2023-12-02 PROCEDURE — 85610 PROTHROMBIN TIME: CPT | Performed by: EMERGENCY MEDICINE

## 2023-12-02 PROCEDURE — 96375 TX/PRO/DX INJ NEW DRUG ADDON: CPT

## 2023-12-02 PROCEDURE — 25010000002 LEVETRIRACETAM PER 10 MG: Performed by: INTERNAL MEDICINE

## 2023-12-02 PROCEDURE — 82746 ASSAY OF FOLIC ACID SERUM: CPT | Performed by: PSYCHIATRY & NEUROLOGY

## 2023-12-02 PROCEDURE — 25010000002 LEVETRIRACETAM PER 10 MG: Performed by: EMERGENCY MEDICINE

## 2023-12-02 PROCEDURE — 82550 ASSAY OF CK (CPK): CPT | Performed by: PSYCHIATRY & NEUROLOGY

## 2023-12-02 PROCEDURE — 93005 ELECTROCARDIOGRAM TRACING: CPT | Performed by: EMERGENCY MEDICINE

## 2023-12-02 PROCEDURE — 82550 ASSAY OF CK (CPK): CPT | Performed by: EMERGENCY MEDICINE

## 2023-12-02 PROCEDURE — 84484 ASSAY OF TROPONIN QUANT: CPT | Performed by: EMERGENCY MEDICINE

## 2023-12-02 PROCEDURE — 80048 BASIC METABOLIC PNL TOTAL CA: CPT | Performed by: NURSE PRACTITIONER

## 2023-12-02 PROCEDURE — 80050 GENERAL HEALTH PANEL: CPT | Performed by: EMERGENCY MEDICINE

## 2023-12-02 PROCEDURE — 70486 CT MAXILLOFACIAL W/O DYE: CPT

## 2023-12-02 PROCEDURE — 25010000002 ONDANSETRON PER 1 MG: Performed by: EMERGENCY MEDICINE

## 2023-12-02 PROCEDURE — 25810000003 LACTATED RINGERS SOLUTION: Performed by: EMERGENCY MEDICINE

## 2023-12-02 PROCEDURE — 84439 ASSAY OF FREE THYROXINE: CPT | Performed by: EMERGENCY MEDICINE

## 2023-12-02 PROCEDURE — 99222 1ST HOSP IP/OBS MODERATE 55: CPT | Performed by: PSYCHIATRY & NEUROLOGY

## 2023-12-02 PROCEDURE — 99285 EMERGENCY DEPT VISIT HI MDM: CPT

## 2023-12-02 PROCEDURE — 70553 MRI BRAIN STEM W/O & W/DYE: CPT

## 2023-12-02 PROCEDURE — 83036 HEMOGLOBIN GLYCOSYLATED A1C: CPT | Performed by: NURSE PRACTITIONER

## 2023-12-02 PROCEDURE — 99284 EMERGENCY DEPT VISIT MOD MDM: CPT

## 2023-12-02 PROCEDURE — 72125 CT NECK SPINE W/O DYE: CPT

## 2023-12-02 PROCEDURE — 25810000003 SODIUM CHLORIDE 0.9 % SOLUTION: Performed by: NURSE PRACTITIONER

## 2023-12-02 PROCEDURE — 83690 ASSAY OF LIPASE: CPT | Performed by: EMERGENCY MEDICINE

## 2023-12-02 PROCEDURE — 82607 VITAMIN B-12: CPT | Performed by: PSYCHIATRY & NEUROLOGY

## 2023-12-02 PROCEDURE — 96376 TX/PRO/DX INJ SAME DRUG ADON: CPT

## 2023-12-02 PROCEDURE — 70450 CT HEAD/BRAIN W/O DYE: CPT

## 2023-12-02 PROCEDURE — 70551 MRI BRAIN STEM W/O DYE: CPT

## 2023-12-02 PROCEDURE — 0 GADOBENATE DIMEGLUMINE 529 MG/ML SOLUTION: Performed by: INTERNAL MEDICINE

## 2023-12-02 PROCEDURE — 81003 URINALYSIS AUTO W/O SCOPE: CPT | Performed by: EMERGENCY MEDICINE

## 2023-12-02 PROCEDURE — 96374 THER/PROPH/DIAG INJ IV PUSH: CPT

## 2023-12-02 PROCEDURE — 25010000002 MORPHINE PER 10 MG: Performed by: EMERGENCY MEDICINE

## 2023-12-02 PROCEDURE — 36415 COLL VENOUS BLD VENIPUNCTURE: CPT | Performed by: NURSE PRACTITIONER

## 2023-12-02 PROCEDURE — 83735 ASSAY OF MAGNESIUM: CPT | Performed by: EMERGENCY MEDICINE

## 2023-12-02 RX ORDER — ACETAMINOPHEN 650 MG/1
650 SUPPOSITORY RECTAL EVERY 4 HOURS PRN
Status: DISCONTINUED | OUTPATIENT
Start: 2023-12-02 | End: 2023-12-03 | Stop reason: HOSPADM

## 2023-12-02 RX ORDER — ONDANSETRON 4 MG/1
4 TABLET, FILM COATED ORAL EVERY 6 HOURS PRN
Status: DISCONTINUED | OUTPATIENT
Start: 2023-12-02 | End: 2023-12-03 | Stop reason: HOSPADM

## 2023-12-02 RX ORDER — LEVETIRACETAM 500 MG/5ML
1000 INJECTION, SOLUTION, CONCENTRATE INTRAVENOUS ONCE
Status: COMPLETED | OUTPATIENT
Start: 2023-12-02 | End: 2023-12-02

## 2023-12-02 RX ORDER — SODIUM CHLORIDE 9 MG/ML
40 INJECTION, SOLUTION INTRAVENOUS AS NEEDED
Status: DISCONTINUED | OUTPATIENT
Start: 2023-12-02 | End: 2023-12-03 | Stop reason: HOSPADM

## 2023-12-02 RX ORDER — ACETAMINOPHEN 325 MG/1
650 TABLET ORAL EVERY 4 HOURS PRN
Status: DISCONTINUED | OUTPATIENT
Start: 2023-12-02 | End: 2023-12-03 | Stop reason: HOSPADM

## 2023-12-02 RX ORDER — NITROGLYCERIN 0.4 MG/1
0.4 TABLET SUBLINGUAL
Status: DISCONTINUED | OUTPATIENT
Start: 2023-12-02 | End: 2023-12-03 | Stop reason: HOSPADM

## 2023-12-02 RX ORDER — SODIUM CHLORIDE 0.9 % (FLUSH) 0.9 %
10 SYRINGE (ML) INJECTION AS NEEDED
Status: DISCONTINUED | OUTPATIENT
Start: 2023-12-02 | End: 2023-12-03 | Stop reason: HOSPADM

## 2023-12-02 RX ORDER — SODIUM CHLORIDE 9 MG/ML
100 INJECTION, SOLUTION INTRAVENOUS CONTINUOUS
Status: DISCONTINUED | OUTPATIENT
Start: 2023-12-02 | End: 2023-12-02

## 2023-12-02 RX ORDER — SODIUM CHLORIDE 0.9 % (FLUSH) 0.9 %
10 SYRINGE (ML) INJECTION EVERY 12 HOURS SCHEDULED
Status: DISCONTINUED | OUTPATIENT
Start: 2023-12-02 | End: 2023-12-03 | Stop reason: HOSPADM

## 2023-12-02 RX ORDER — ALBUTEROL SULFATE 2.5 MG/3ML
2.5 SOLUTION RESPIRATORY (INHALATION) EVERY 6 HOURS PRN
Status: DISCONTINUED | OUTPATIENT
Start: 2023-12-02 | End: 2023-12-03 | Stop reason: HOSPADM

## 2023-12-02 RX ORDER — HYDROCODONE BITARTRATE AND ACETAMINOPHEN 5; 325 MG/1; MG/1
1 TABLET ORAL EVERY 4 HOURS PRN
Status: DISCONTINUED | OUTPATIENT
Start: 2023-12-02 | End: 2023-12-03 | Stop reason: HOSPADM

## 2023-12-02 RX ORDER — LEVETIRACETAM 500 MG/5ML
500 INJECTION, SOLUTION, CONCENTRATE INTRAVENOUS EVERY 12 HOURS SCHEDULED
Status: DISCONTINUED | OUTPATIENT
Start: 2023-12-02 | End: 2023-12-03 | Stop reason: HOSPADM

## 2023-12-02 RX ORDER — CETIRIZINE HYDROCHLORIDE 10 MG/1
10 TABLET ORAL DAILY
Status: DISCONTINUED | OUTPATIENT
Start: 2023-12-02 | End: 2023-12-03 | Stop reason: HOSPADM

## 2023-12-02 RX ORDER — ONDANSETRON 2 MG/ML
4 INJECTION INTRAMUSCULAR; INTRAVENOUS EVERY 6 HOURS PRN
Status: DISCONTINUED | OUTPATIENT
Start: 2023-12-02 | End: 2023-12-03 | Stop reason: HOSPADM

## 2023-12-02 RX ORDER — PANTOPRAZOLE SODIUM 40 MG/1
40 TABLET, DELAYED RELEASE ORAL
Status: DISCONTINUED | OUTPATIENT
Start: 2023-12-02 | End: 2023-12-03 | Stop reason: HOSPADM

## 2023-12-02 RX ORDER — LORAZEPAM 2 MG/ML
1 INJECTION INTRAMUSCULAR EVERY 4 HOURS PRN
Status: DISCONTINUED | OUTPATIENT
Start: 2023-12-02 | End: 2023-12-03 | Stop reason: HOSPADM

## 2023-12-02 RX ORDER — MORPHINE SULFATE 2 MG/ML
4 INJECTION, SOLUTION INTRAMUSCULAR; INTRAVENOUS ONCE
Status: COMPLETED | OUTPATIENT
Start: 2023-12-02 | End: 2023-12-02

## 2023-12-02 RX ORDER — MONTELUKAST SODIUM 10 MG/1
10 TABLET ORAL NIGHTLY
Status: DISCONTINUED | OUTPATIENT
Start: 2023-12-02 | End: 2023-12-03 | Stop reason: HOSPADM

## 2023-12-02 RX ORDER — CALCIUM CARBONATE 500 MG/1
2 TABLET, CHEWABLE ORAL 2 TIMES DAILY PRN
Status: DISCONTINUED | OUTPATIENT
Start: 2023-12-02 | End: 2023-12-03 | Stop reason: HOSPADM

## 2023-12-02 RX ORDER — ONDANSETRON 2 MG/ML
4 INJECTION INTRAMUSCULAR; INTRAVENOUS ONCE
Status: COMPLETED | OUTPATIENT
Start: 2023-12-02 | End: 2023-12-02

## 2023-12-02 RX ORDER — ACETAMINOPHEN 160 MG/5ML
650 SOLUTION ORAL EVERY 4 HOURS PRN
Status: DISCONTINUED | OUTPATIENT
Start: 2023-12-02 | End: 2023-12-03 | Stop reason: HOSPADM

## 2023-12-02 RX ORDER — MORPHINE SULFATE 2 MG/ML
2 INJECTION, SOLUTION INTRAMUSCULAR; INTRAVENOUS EVERY 4 HOURS PRN
Status: DISCONTINUED | OUTPATIENT
Start: 2023-12-02 | End: 2023-12-03 | Stop reason: HOSPADM

## 2023-12-02 RX ADMIN — MORPHINE SULFATE 4 MG: 2 INJECTION, SOLUTION INTRAMUSCULAR; INTRAVENOUS at 02:03

## 2023-12-02 RX ADMIN — Medication 10 ML: at 20:06

## 2023-12-02 RX ADMIN — LEVETIRACETAM 1000 MG: 500 INJECTION, SOLUTION INTRAVENOUS at 02:02

## 2023-12-02 RX ADMIN — ONDANSETRON 4 MG: 2 INJECTION INTRAMUSCULAR; INTRAVENOUS at 02:02

## 2023-12-02 RX ADMIN — PANTOPRAZOLE SODIUM 40 MG: 40 TABLET, DELAYED RELEASE ORAL at 09:58

## 2023-12-02 RX ADMIN — SODIUM CHLORIDE 100 ML/HR: 9 INJECTION, SOLUTION INTRAVENOUS at 06:11

## 2023-12-02 RX ADMIN — ACETAMINOPHEN 650 MG: 325 TABLET ORAL at 09:58

## 2023-12-02 RX ADMIN — SODIUM CHLORIDE, POTASSIUM CHLORIDE, SODIUM LACTATE AND CALCIUM CHLORIDE 1000 ML: 600; 310; 30; 20 INJECTION, SOLUTION INTRAVENOUS at 01:36

## 2023-12-02 RX ADMIN — Medication 10 ML: at 08:01

## 2023-12-02 RX ADMIN — LEVETIRACETAM 500 MG: 500 INJECTION, SOLUTION INTRAVENOUS at 20:05

## 2023-12-02 RX ADMIN — CETIRIZINE HYDROCHLORIDE 10 MG: 10 TABLET ORAL at 09:59

## 2023-12-02 RX ADMIN — LEVETIRACETAM 500 MG: 500 INJECTION, SOLUTION INTRAVENOUS at 09:58

## 2023-12-02 RX ADMIN — GADOBENATE DIMEGLUMINE 20 ML: 529 INJECTION, SOLUTION INTRAVENOUS at 15:06

## 2023-12-02 NOTE — ED PROVIDER NOTES
EMERGENCY DEPARTMENT ENCOUNTER    Room Number:  P592/1  Date seen:  12/2/2023  PCP: Pamela Ojeda MD  Historian: Patient, family and EMS      HPI:  Chief Complaint: New onset seizure  Context: Brandon Marsh is a 23 y.o. male who presents to the ED via EMS from home for new onset seizure.  The patient states he was playing a video game with his friends and the next thing he remembers is waking up on the floor with EMS around him.  The patient's father states he was at home and heard a loud thump and went upstairs to check on the patient and found him lying facedown with tonic-clonic activity.  He states the patient hit his head and face on a table.  He states the patient was confused but is now back to baseline.  The patient complains of head, face and low back pain.  The patient denies any alcohol or drug use.  He denies a history of seizure disorder.      PAST MEDICAL HISTORY  Active Ambulatory Problems     Diagnosis Date Noted    Allergic 08/31/2022    Asthma 08/31/2022    ADHD 08/31/2022    Alpha thalassemia trait 10/28/2022    Esophagitis determined by biopsy 03/01/2023    Vitamin D deficiency 03/01/2023    Physical deconditioning 03/01/2023    Obesity (BMI 30-39.9) 03/01/2023    Routine health maintenance 10/02/2023     Resolved Ambulatory Problems     Diagnosis Date Noted    GERD (gastroesophageal reflux disease) 08/31/2022    Gastric hemorrhage due to erosive gastritis 08/31/2022    COVID-19 virus detected 12/22/2022    Tachycardia 03/01/2023     Past Medical History:   Diagnosis Date    ADHD (attention deficit hyperactivity disorder)     Clotting disorder     Seasonal allergies          REVIEW OF SYSTEMS  All systems reviewed and negative except for those discussed in HPI.       PAST SURGICAL HISTORY  Past Surgical History:   Procedure Laterality Date    COLONOSCOPY      ENDOSCOPY N/A     ENDOSCOPY N/A 11/17/2022    Procedure: ESOPHAGOGASTRODUODENOSCOPY with bx;  Surgeon: Ronak Rodriguez MD;   Location: [facility] ENDOSCOPY;  Service: Gastroenterology;  Laterality: N/A;  pre: GERD  post: gastritis, esophagitis     UPPER GASTROINTESTINAL ENDOSCOPY           FAMILY HISTORY  Family History   Problem Relation Age of Onset    Arthritis Mother     Cervical cancer Mother     Cancer Mother         Cervical Carcinoma    Hyperlipidemia Father     Arthritis Father     Depression Father     Anxiety disorder Father     Heart disease Father         Afib    Hypertension Father     Mental illness Father         Bipolar 1    Anxiety disorder Sister     Depression Sister     Breast cancer Maternal Aunt     Asthma Maternal Aunt     Lung cancer Paternal Grandmother     Cancer Paternal Grandmother         Small Cell Lung Cancer, neuroendocrine tumor in stomach, Basal Cell Carcinoma    COPD Paternal Grandmother     Depression Paternal Grandmother     Heart disease Paternal Grandmother     Vision loss Maternal Grandfather         Glaucoma    Arthritis Maternal Grandmother     Depression Maternal Grandmother     Drug abuse Paternal Aunt          SOCIAL HISTORY  Social History     Socioeconomic History    Marital status: Single   Tobacco Use    Smoking status: Never    Smokeless tobacco: Never   Vaping Use    Vaping Use: Never used   Substance and Sexual Activity    Alcohol use: Never    Drug use: Never    Sexual activity: Never         ALLERGIES  Nuts      PHYSICAL EXAM  ED Triage Vitals [12/02/23 0102]   Temp Heart Rate Resp BP SpO2   97.7 °F (36.5 °C) 112 19 158/82 92 %      Temp src Heart Rate Source Patient Position BP Location FiO2 (%)   -- -- -- -- --       Physical Exam      GENERAL: 23-year-old male in mild distress  HENT: Abrasion to left forehead: nares patent: Neck supple  EYES: no scleral icterus: PERRLA: EOMI: No hyphema: Small laceration to lateral canthus  CV: regular rhythm, normal rate  RESPIRATORY: normal effort  ABDOMEN: soft, NTND: Bowel sounds positive  MUSCULOSKELETAL: no deformity: The patient has no extremity  trauma with full range of motion of all 4 extremities without difficulty.  He has minimal C-spine, no T-spine but significant L-spine tenderness to palpation  NEURO: alert and oriented x 3 with a nonfocal neuroexam  PSYCH:  calm, cooperative  SKIN: warm, dry    Vital signs and nursing notes reviewed.      LAB RESULTS  Recent Results (from the past 24 hour(s))   Comprehensive Metabolic Panel    Collection Time: 12/02/23  1:35 AM    Specimen: Blood   Result Value Ref Range    Glucose 123 (H) 65 - 99 mg/dL    BUN 9 6 - 20 mg/dL    Creatinine 0.89 0.76 - 1.27 mg/dL    Sodium 139 136 - 145 mmol/L    Potassium 3.8 3.5 - 5.2 mmol/L    Chloride 106 98 - 107 mmol/L    CO2 23.6 22.0 - 29.0 mmol/L    Calcium 9.4 8.6 - 10.5 mg/dL    Total Protein 7.3 6.0 - 8.5 g/dL    Albumin 4.4 3.5 - 5.2 g/dL    ALT (SGPT) 38 1 - 41 U/L    AST (SGOT) 29 1 - 40 U/L    Alkaline Phosphatase 82 39 - 117 U/L    Total Bilirubin 0.3 0.0 - 1.2 mg/dL    Globulin 2.9 gm/dL    A/G Ratio 1.5 g/dL    BUN/Creatinine Ratio 10.1 7.0 - 25.0    Anion Gap 9.4 5.0 - 15.0 mmol/L    eGFR 122.0 >60.0 mL/min/1.73   Protime-INR    Collection Time: 12/02/23  1:35 AM    Specimen: Blood   Result Value Ref Range    Protime 14.5 (H) 11.7 - 14.2 Seconds    INR 1.12 (H) 0.90 - 1.10   Lipase    Collection Time: 12/02/23  1:35 AM    Specimen: Blood   Result Value Ref Range    Lipase 17 13 - 60 U/L   High Sensitivity Troponin T    Collection Time: 12/02/23  1:35 AM    Specimen: Blood   Result Value Ref Range    HS Troponin T 7 <22 ng/L   Ethanol    Collection Time: 12/02/23  1:35 AM    Specimen: Blood   Result Value Ref Range    Ethanol <10 0 - 10 mg/dL    Ethanol % <0.010 %   Magnesium    Collection Time: 12/02/23  1:35 AM    Specimen: Blood   Result Value Ref Range    Magnesium 2.3 1.6 - 2.6 mg/dL   TSH    Collection Time: 12/02/23  1:35 AM    Specimen: Blood   Result Value Ref Range    TSH 1.960 0.270 - 4.200 uIU/mL   T4, Free    Collection Time: 12/02/23  1:35 AM     Specimen: Blood   Result Value Ref Range    Free T4 1.16 0.93 - 1.70 ng/dL   CK    Collection Time: 12/02/23  1:35 AM    Specimen: Blood   Result Value Ref Range    Creatine Kinase 219 (H) 20 - 200 U/L   CBC Auto Differential    Collection Time: 12/02/23  1:35 AM    Specimen: Blood   Result Value Ref Range    WBC 10.93 (H) 3.40 - 10.80 10*3/mm3    RBC 5.65 4.14 - 5.80 10*6/mm3    Hemoglobin 14.9 13.0 - 17.7 g/dL    Hematocrit 45.2 37.5 - 51.0 %    MCV 80.0 79.0 - 97.0 fL    MCH 26.4 (L) 26.6 - 33.0 pg    MCHC 33.0 31.5 - 35.7 g/dL    RDW 12.9 12.3 - 15.4 %    RDW-SD 36.4 (L) 37.0 - 54.0 fl    MPV 11.3 6.0 - 12.0 fL    Platelets 234 140 - 450 10*3/mm3    Neutrophil % 52.2 42.7 - 76.0 %    Lymphocyte % 31.4 19.6 - 45.3 %    Monocyte % 8.5 5.0 - 12.0 %    Eosinophil % 4.0 0.3 - 6.2 %    Basophil % 1.1 0.0 - 1.5 %    Immature Grans % 2.8 (H) 0.0 - 0.5 %    Neutrophils, Absolute 5.70 1.70 - 7.00 10*3/mm3    Lymphocytes, Absolute 3.43 (H) 0.70 - 3.10 10*3/mm3    Monocytes, Absolute 0.93 (H) 0.10 - 0.90 10*3/mm3    Eosinophils, Absolute 0.44 (H) 0.00 - 0.40 10*3/mm3    Basophils, Absolute 0.12 0.00 - 0.20 10*3/mm3    Immature Grans, Absolute 0.31 (H) 0.00 - 0.05 10*3/mm3    nRBC 0.0 0.0 - 0.2 /100 WBC   ECG 12 Lead Stroke Evaluation    Collection Time: 12/02/23  1:37 AM   Result Value Ref Range    QT Interval 329 ms    QTC Interval 410 ms   Urinalysis With Microscopic If Indicated (No Culture) - Urine, Clean Catch    Collection Time: 12/02/23  2:49 AM    Specimen: Urine, Clean Catch   Result Value Ref Range    Color, UA Yellow Yellow, Straw    Appearance, UA Clear Clear    pH, UA 7.0 5.0 - 8.0    Specific Gravity, UA 1.025 1.005 - 1.030    Glucose, UA Negative Negative    Ketones, UA Trace (A) Negative    Bilirubin, UA Negative Negative    Blood, UA Negative Negative    Protein, UA Trace (A) Negative    Leuk Esterase, UA Negative Negative    Nitrite, UA Negative Negative    Urobilinogen, UA 1.0 E.U./dL 0.2 - 1.0 E.U./dL    Urine Drug Screen - Urine, Clean Catch    Collection Time: 12/02/23  2:49 AM    Specimen: Urine, Clean Catch   Result Value Ref Range    Amphet/Methamphet, Screen Negative Negative    Barbiturates Screen, Urine Negative Negative    Benzodiazepine Screen, Urine Negative Negative    Cocaine Screen, Urine Negative Negative    Opiate Screen Positive (A) Negative    THC, Screen, Urine Negative Negative    Methadone Screen, Urine Negative Negative    Oxycodone Screen, Urine Negative Negative    Fentanyl, Urine Negative Negative   High Sensitivity Troponin T 2Hr    Collection Time: 12/02/23  4:07 AM    Specimen: Blood   Result Value Ref Range    HS Troponin T 12 <22 ng/L    Troponin T Delta 5 (C) >=-4 - <+4 ng/L   Basic Metabolic Panel    Collection Time: 12/02/23  4:07 AM    Specimen: Blood   Result Value Ref Range    Glucose 106 (H) 65 - 99 mg/dL    BUN 8 6 - 20 mg/dL    Creatinine 0.86 0.76 - 1.27 mg/dL    Sodium 141 136 - 145 mmol/L    Potassium 3.6 3.5 - 5.2 mmol/L    Chloride 107 98 - 107 mmol/L    CO2 24.7 22.0 - 29.0 mmol/L    Calcium 8.7 8.6 - 10.5 mg/dL    BUN/Creatinine Ratio 9.3 7.0 - 25.0    Anion Gap 9.3 5.0 - 15.0 mmol/L    eGFR 123.2 >60.0 mL/min/1.73   CBC (No Diff)    Collection Time: 12/02/23  7:31 AM    Specimen: Blood   Result Value Ref Range    WBC 11.06 (H) 3.40 - 10.80 10*3/mm3    RBC 5.20 4.14 - 5.80 10*6/mm3    Hemoglobin 13.7 13.0 - 17.7 g/dL    Hematocrit 41.5 37.5 - 51.0 %    MCV 79.8 79.0 - 97.0 fL    MCH 26.3 (L) 26.6 - 33.0 pg    MCHC 33.0 31.5 - 35.7 g/dL    RDW 12.9 12.3 - 15.4 %    RDW-SD 36.8 (L) 37.0 - 54.0 fl    MPV 11.7 6.0 - 12.0 fL    Platelets 215 140 - 450 10*3/mm3   Hemoglobin A1c    Collection Time: 12/02/23  7:31 AM    Specimen: Blood   Result Value Ref Range    Hemoglobin A1C 5.30 4.80 - 5.60 %   CK    Collection Time: 12/02/23  2:20 PM    Specimen: Arm, Right; Blood   Result Value Ref Range    Creatine Kinase 234 (H) 20 - 200 U/L   Folate    Collection Time: 12/02/23  2:20  PM    Specimen: Arm, Right; Blood   Result Value Ref Range    Folate 9.15 4.78 - 24.20 ng/mL   Vitamin B12    Collection Time: 12/02/23  2:20 PM    Specimen: Arm, Right; Blood   Result Value Ref Range    Vitamin B-12 618 211 - 946 pg/mL       Ordered the above labs and reviewed the results.        RADIOLOGY  MRI Brain With & Without Contrast    Result Date: 12/2/2023  MRI OF THE BRAIN WITH AND WITHOUT CONTRAST  CLINICAL HISTORY: Seizure.  TECHNIQUE: MRI of the brain was obtained with sagittal T1, axial pre-gadolinium and postgadolinium T1, axial 3D SPGR, coronal postgadolinium T1,  axial FLAIR, axial T2, axial gradient echo, and axial diffusion-weighted images. Additionally, there are thin cut coronal T2-weighted images through the mesial temporal structures.  COMPARISON: CT head dated 12/02/2023.  FINDINGS:  The ventricles, sulci, and cisterns are age-appropriate. There are no abnormal foci of restricted diffusion. There are no abnormal foci of susceptibility artifact. There are no abnormal areas of contrast enhancement. Incidental note is made of a fenestration at the level of the proximal basilar artery. Otherwise, the major intracranial flow related signal voids are within normal limits. No congenital anomalies are appreciated. The midline intracranial anatomy is unremarkable. The mesial temporal structures are symmetric in signal intensity and volume.       Unremarkable MRI of the brain. In specific, no potential seizure focus is identified on this examination.  This report was finalized on 12/2/2023 6:49 PM by Dr. Cameron Carlisle M.D on Workstation: [id number]      EEG    Result Date: 12/2/2023  Date of onset: 12/2/2023 Date of offset: 12/2/2023  Indication: 23 year old man with seizure.  EEG for further evaluation.  Technical description:  This is a 21 channel digital EEG recording that began on 1656 and ended on 1722.  Electrodes were placed based on the 10-20 international electrode placement system.    Background:  The EEG recording demonstrates normal background activity with mainly alpha frequencies with intermixed theta frequencies.  10 Hz frequencies are present posteriorly and symmetrically.  The patient enters both stage 1 and 2 sleep with architecture present including vertex sharp transients and sleep spindles.  Hyperventilation was not performed but photic stimulation was performed with no additional abnormalities noted.  There are no asymmetries between the two hemispheres.  No interictal activity is present.  The EKG monitor shows a heart rate that is around 100 beats per minute.  Clinical interpretation:  This routine awake and sleep EEG is normal.  No potentially epileptogenic activity, seizure activity, or focal slowing is present.  Careful clinical correlation is advised.       MRI Brain Without Contrast    Result Date: 12/2/2023  MRI OF THE BRAIN WITHOUT CONTRAST  CLINICAL HISTORY: New onset seizure.  TECHNIQUE: MRI of the brain was obtained with sagittal T1, axial T1, axial FLAIR, axial T2, axial diffusion, and axial gradient echo, axial 3D SPGR images. Additionally, there are thin cut coronal T2-weighted images through the mesial temporal structures.  FINDINGS:  The midline intracranial anatomy is unremarkable. There are no abnormal foci of restricted diffusion. The major intracranial flow related signal voids are unremarkable. No abnormal foci of susceptibility artifact are seen. No congenital anomalies are appreciated. The mesial temporal structures are symmetric in signal intensity and volume.  There is mild mucosal thickening within the ethmoid air cells and the left maxillary sinus. Small mucous retention cysts are incidentally noted within the maxillary sinuses.       The noncontrast MRI of the brain is unremarkable. However, an ideal evaluation for a seizure focus should include an MRI of the brain with and without the use of IV contrast. This could be obtained as clinically indicated.   This report was finalized on 12/2/2023 4:51 PM by Dr. Cameron Carlisle M.D on Workstation: [id number]      CT Cervical Spine Without Contrast    Result Date: 12/2/2023  Patient: IBIS UREÑA  Time Out: 03:08 Exam(s): CT C SPINE EXAM:   CT Cervical Spine Without Intravenous Contrast CLINICAL HISTORY:    Reason for exam: Pain after fall. TECHNIQUE:   Axial computed tomography images of the cervical spine without intravenous contrast.  CTDI is 19.8 mGy and DLP is 420.9 mGy-cm.  This CT exam was performed according to the principle of ALARA (As Low As Reasonably Achievable) by using one or more of the following dose reduction techniques: automated exposure control, adjustment of the mA and or kV according to patient size, and or use of iterative reconstruction technique. COMPARISON:   No relevant prior studies available. FINDINGS:   Vertebrae:  Unremarkable.  No acute fracture.   Discs spinal canal neural foramina:  No acute findings.  No spinal canal stenosis.   Soft tissues:  Unremarkable. IMPRESSION:       Normal cervical spine CT.     Electronically signed by Jevon Wall MD on 12-02-23 at 0308    CT Lumbar Spine Without Contrast    Result Date: 12/2/2023  Patient: IBIS UREÑA  Time Out: 03:08 Exam(s): CT L SPINE EXAM:   CT Lumbar Spine Without Intravenous Contrast CLINICAL HISTORY:    Reason for exam: Fall. TECHNIQUE:   Axial computed tomography images of the lumbar spine without intravenous contrast.  CTDI is 30.16 mGy and DLP is 839.4 mGy-cm.  This CT exam was performed according to the principle of ALARA (As Low As Reasonably Achievable) by using one or more of the following dose reduction techniques: automated exposure control, adjustment of the mA and or kV according to patient size, and or use of iterative reconstruction technique. COMPARISON:   No relevant prior studies available. FINDINGS:   Vertebrae:  Unremarkable.  No acute fracture.   Discs spinal canal neural foramina:  No acute findings.  No  spinal canal stenosis.   Soft tissues:  Unremarkable. IMPRESSION:       Normal lumbar spine CT.     Electronically signed by Jevon Wall MD on 12-02-23 at 0308    CT Head Without Contrast    Result Date: 12/2/2023  Patient: IBIS UREÑA  Time Out: 03:07 Exam(s): CT HEAD Without Contrast EXAM:   CT Head Without Intravenous Contrast CLINICAL HISTORY:    Reason for exam: New onset seizure. TECHNIQUE:   Axial computed tomography images of the head brain without intravenous contrast.  CTDI is 54.92 mGy and DLP is 1024.1 mGy-cm.  This CT exam was performed according to the principle of ALARA (As Low As Reasonably Achievable) by using one or more of the following dose reduction techniques: automated exposure control, adjustment of the mA and or kV according to patient size, and or use of iterative reconstruction technique. COMPARISON:   No relevant prior studies available. FINDINGS:   Brain:  Unremarkable.  No hemorrhage.  No significant white matter disease.  No edema.   Ventricles:  Unremarkable.  No ventriculomegaly.   Bones joints:  Unremarkable.  No acute fracture.   Soft tissues:  Unremarkable.   Sinuses:  Unremarkable as visualized.  No acute sinusitis.   Mastoid air cells:  Unremarkable as visualized.  No mastoid effusion. IMPRESSION:       Normal head brain CT.     Electronically signed by Jevon Wall MD on 12-02-23 at 0307    CT Facial Bones Without Contrast    Result Date: 12/2/2023  Patient: IBIS UREÑA  Time Out: 03:03 Exam(s): CT FACIAL Without Contrast EXAM:   CT Maxillofacial Without Intravenous Contrast CLINICAL HISTORY:    Reason for exam: Fall with left orbit trauma. TECHNIQUE:   Axial computed tomography images of the face without intravenous contrast.  CTDI is 48.25 mGy and DLP is 996 mGy-cm.  This CT exam was performed according to the principle of ALARA (As Low As Reasonably Achievable) by using one or more of the following dose reduction techniques: automated exposure control,  adjustment of the mA and or kV according to patient size, and or use of iterative reconstruction technique. COMPARISON:   No relevant prior studies available. FINDINGS:   Bones joints:  No acute fracture.   Soft tissues:  Unremarkable.   Orbits:  Unremarkable.   Sinuses:  Bilateral maxillary sinus inflammatory polyps.  No air-fluid levels. IMPRESSION:       No acute findings in the face.     Electronically signed by Jevon Wall MD on 12-02-23 at 0303     Ordered the above noted radiological studies. Reviewed by me in PACS.            PROCEDURES  Procedures          MEDICATIONS GIVEN IN ER  Medications   sodium chloride 0.9 % flush 10 mL (10 mL Intravenous Given 12/2/23 0801)   sodium chloride 0.9 % flush 10 mL (has no administration in time range)   sodium chloride 0.9 % infusion 40 mL (has no administration in time range)   nitroglycerin (NITROSTAT) SL tablet 0.4 mg (has no administration in time range)   acetaminophen (TYLENOL) tablet 650 mg (650 mg Oral Given 12/2/23 0958)     Or   acetaminophen (TYLENOL) 160 MG/5ML oral solution 650 mg ( Oral Not Given:  See Alt 12/2/23 0958)     Or   acetaminophen (TYLENOL) suppository 650 mg ( Rectal Not Given:  See Alt 12/2/23 0958)   ondansetron (ZOFRAN) tablet 4 mg (has no administration in time range)     Or   ondansetron (ZOFRAN) injection 4 mg (has no administration in time range)   calcium carbonate (TUMS) chewable tablet 500 mg (200 mg elemental) (has no administration in time range)   LORazepam (ATIVAN) injection 1 mg (has no administration in time range)   levETIRAcetam (KEPPRA) injection 500 mg (500 mg Intravenous Given 12/2/23 0958)   cetirizine (zyrTEC) tablet 10 mg (10 mg Oral Given 12/2/23 0959)   pantoprazole (PROTONIX) EC tablet 40 mg (40 mg Oral Given 12/2/23 0958)   albuterol (PROVENTIL) nebulizer solution 0.083% 2.5 mg/3mL (has no administration in time range)   montelukast (SINGULAIR) tablet 10 mg (has no administration in time range)    HYDROcodone-acetaminophen (NORCO) 5-325 MG per tablet 1 tablet (has no administration in time range)   morphine injection 2 mg (has no administration in time range)   lactated ringers bolus 1,000 mL (0 mL Intravenous Stopped 12/2/23 0221)   levETIRAcetam (KEPPRA) injection 1,000 mg (1,000 mg Intravenous Given 12/2/23 0202)   ondansetron (ZOFRAN) injection 4 mg (4 mg Intravenous Given 12/2/23 0202)   morphine injection 4 mg (4 mg Intravenous Given 12/2/23 0203)   gadobenate dimeglumine (MULTIHANCE) injection 20 mL (20 mL Intravenous Given 12/2/23 1506)             MEDICAL DECISION MAKING, PROGRESS, and CONSULTS    All labs have been independently reviewed by me.  All radiology studies have been reviewed by me and I have also reviewed the radiology report.   EKG's independently viewed and interpreted by me.  Discussion below represents my analysis of pertinent findings related to patient's condition, differential diagnosis, treatment plan and final disposition.      Additional sources:  - Discussed/ obtained information from independent historians: The patient's parents are here and states that he did not drink alcohol or do drugs and has no history of seizures    - External (non-ED) record review: Patient's parents are here who states that he normally just goes to work and comes home and that he does not do alcohol or drugs    - Chronic or social conditions impacting care: Patient lives at home with his family    - Shared decision making: After shared decision-making discussion we agree he needs to be admitted to the hospital for further evaluation and care      Orders placed during this visit:  Orders Placed This Encounter   Procedures    CT Head Without Contrast    CT Cervical Spine Without Contrast    CT Facial Bones Without Contrast    CT Lumbar Spine Without Contrast    MRI Brain Without Contrast    MRI Brain With & Without Contrast    Comprehensive Metabolic Panel    Protime-INR    Lipase    Urinalysis With  Microscopic If Indicated (No Culture) - Urine, Clean Catch    High Sensitivity Troponin T    Ethanol    Urine Drug Screen - Urine, Clean Catch    Magnesium    TSH    T4, Free    CK    CBC Auto Differential    High Sensitivity Troponin T 2Hr    Basic Metabolic Panel    CBC (No Diff)    Hemoglobin A1c    CK    Folate    Vitamin B12    CK    CBC (No Diff)    Basic Metabolic Panel    Diet: Regular/House Diet; Texture: Regular Texture (IDDSI 7); Fluid Consistency: Thin (IDDSI 0)    Monitor Blood Pressure    Pulse Oximetry, Continuous    Vital Signs    Intake & Output    Weigh Patient    Oral Care    Place Sequential Compression Device    Maintain Sequential Compression Device    Telemetry - Maintain IV Access    Telemetry - Place Orders & Notify Provider of Results When Patient Experiences Acute Chest Pain, Dysrhythmia or Respiratory Distress    May Be Off Telemetry for Tests    Neuro Checks    Code Status and Medical Interventions:    [facility] (on-call MD unless specified) Details    Inpatient Neurology Consult General    ECG 12 Lead Stroke Evaluation    SCANNED - TELEMETRY      SCANNED - TELEMETRY      SCANNED - TELEMETRY      EEG    Insert Peripheral IV    Initiate Observation Status    Seizure Precautions    CBC & Differential         Differential diagnosis:  My differential diagnosis includes but is not limited to seizure, pseudoseizure, orthostatic hypotension or medication induced      Independent interpretation of labs, radiology studies, and discussions with consultants:  ED Course as of 12/02/23 1954   Sat Dec 02, 2023   0134 I will treat the patient with IV fluids, Keppra and pain medication while obtaining labs, EKG and CT scans for further evaluation. [GP]   0140 EKG    EKG time: 1:37 AM  Rhythm/Rate: Normal sinus rhythm at 93  Normal intervals  No Acute Ischemia  Non-Specific ST-T changes  No old EKG for comparison    Interpreted Contemporaneously by me.  Independently viewed by me     [GP]   8554 Patient CK is  minimally elevated to 19.  Otherwise his blood work is unremarkable. [GP]   0237 On repeat evaluation the patient is resting comfortably.  His vital signs are stable.  I advised the patient and his family that we are awaiting his CT scans but will need to eventually admit him to the hospital for his new onset seizure. [GP]   0307 On repeat evaluation the patient is resting comfortably in bed.  The patient CT scans are still pending.  Advised the patient and his parents that we will admit him to the hospital for further evaluation and care of his new onset seizures.  They understand and agree with the plan.  At this time I will handoff the patient's care to my partner Dr. Weeks to follow-up on the patient's CTs and admit the patient. [GP]   1956 The patient CT brain, C-spine, L-spine and facial bones are negative acute.  We will admit him to the hospital for further evaluation and care. [GP]      ED Course User Index  [GP] Cristopher Munguia MD               DIAGNOSIS  Final diagnoses:   New onset seizure         DISPOSITION  ADMISSION    Discussed treatment plan and reason for admission with pt/family and admitting physician.  Pt/family voiced understanding of the plan for admission for further testing/treatment as needed.            Latest Documented Vital Signs:  As of 19:54 EST  BP- 121/72 HR- 86 Temp- 99.1 °F (37.3 °C) (Oral) O2 sat- 95%--      --------------------  Please note that portions of this were completed with a voice recognition program.       Note Disclaimer: At [facility], we believe that sharing information builds trust and better relationships. You are receiving this note because you are receiving care at [facility] or recently visited. It is possible you will see health information before a provider has talked with you about it. This kind of information can be easy to misunderstand. To help you fully understand what it means for your health, we urge you to discuss this note with your  provider.             Cristopher Munguia MD  12/02/23 1955

## 2023-12-02 NOTE — CONSULTS
Neurology Consult Note    Referring Provider: Dr. Harley  Reason for Consultation: Seizure      History of present illness:      23-year-old man with past medical history of asthma and seasonal allergies, GERD, erosive gastritis, alpha thalassemia trait, ADHD.    Presents after an event concerning for seizure.  Was at home in his room playing video games and the next thing he remembers is waking up on the floor with EMS around him.  Family at the bedside reports that they heard a loud thump and went upstairs to check on him and found him face down between the bed and the entertainment center with tonic-clonic activity.  Apparently hit his the left side of his head and face on the table.  He bit the left side of his tongue.  The tonic-clonic activity lasted for approximately 2 minutes.  He was then postictal for at least 20 minutes.  No urinary incontinence.  No recent fevers or other constitutional symptoms.  No sleep deprivation.  No changes in medications.  Denies alcohol or drug use.  No family history of epilepsy.  His mother reports that he had febrile seizures as a child.  She notes that he has occasional eye blinks that he is unaware of.  He reports that he may possibly have rare jerking movements of his extremities or holds a cup and it may drop out of his hand.  No report of staring spells.  In the ED he complained of head face and low back pain.  Was treated with Keppra 1000 mg IV x 1 and continued on 500 every 12 hours.  CPK 19.  Head CT showed no acute intracranial process.      Past Medical History:   Diagnosis Date    ADHD (attention deficit hyperactivity disorder)     Not currently on medication    Allergic     Asthma     Clotting disorder     Alpha Thalassemia Trait    COVID-19 virus detected 12/22/2022    Gastric hemorrhage due to erosive gastritis 08/31/2022    GERD (gastroesophageal reflux disease)     Seasonal allergies     Tachycardia 03/01/2023       Allergies   Allergen Reactions    Nuts  Shortness Of Breath     tree     No current facility-administered medications on file prior to encounter.     Current Outpatient Medications on File Prior to Encounter   Medication Sig    cetirizine (zyrTEC) 10 MG tablet Take 1 tablet by mouth Daily.    lansoprazole (Prevacid) 30 MG capsule Take 1 capsule by mouth Daily.    levalbuterol (XOPENEX HFA) 45 MCG/ACT inhaler Inhale 1-2 puffs Every 4 (Four) Hours As Needed for Wheezing.    levalbuterol (XOPENEX) 0.63 MG/3ML nebulizer solution Take 1 ampule by nebulization Every 4 (Four) Hours As Needed for Wheezing or Shortness of Air.    montelukast (SINGULAIR) 10 MG tablet Take 1 tablet by mouth every night at bedtime.       Social History     Socioeconomic History    Marital status: Single   Tobacco Use    Smoking status: Never    Smokeless tobacco: Never   Vaping Use    Vaping Use: Never used   Substance and Sexual Activity    Alcohol use: Never    Drug use: Never    Sexual activity: Never     Family History   Problem Relation Age of Onset    Arthritis Mother     Cervical cancer Mother     Cancer Mother         Cervical Carcinoma    Hyperlipidemia Father     Arthritis Father     Depression Father     Anxiety disorder Father     Heart disease Father         Afib    Hypertension Father     Mental illness Father         Bipolar 1    Anxiety disorder Sister     Depression Sister     Breast cancer Maternal Aunt     Asthma Maternal Aunt     Lung cancer Paternal Grandmother     Cancer Paternal Grandmother         Small Cell Lung Cancer, neuroendocrine tumor in stomach, Basal Cell Carcinoma    COPD Paternal Grandmother     Depression Paternal Grandmother     Heart disease Paternal Grandmother     Vision loss Maternal Grandfather         Glaucoma    Arthritis Maternal Grandmother     Depression Maternal Grandmother     Drug abuse Paternal Aunt        Review of Systems  A 14-point review of systems was reviewed and was negative except for the symptoms mentioned in the  HPI    Vital Signs   Temp:  [97.7 °F (36.5 °C)-99.5 °F (37.5 °C)] 99.3 °F (37.4 °C)  Heart Rate:  [] 100  Resp:  [18-20] 18  BP: (116-158)/(59-97) 116/61    General Exam:  Head:  Normocephalic, atraumatic  HEENT:  Neck supple  Fundoscopic Exam:  No signs of disc edema  CVS:  Regular rate and rhythm.  No murmurs  Carotid Examination:  No bruits  Lungs:  Clear to auscultation  Abdomen:  Nontender, nondistended  Extremities:  No signs of peripheral edema  Skin:  No rashes    Neurologic Exam:    Mental Status:    -Awake, alert, oriented x3  -No word finding difficulties  -No aphasia  -No dysarthria  -Follows simple and complex commands    CN II:  Visual fields full.  Pupils equally reactive to light  CN III, IV, VI:  Extraocular muscles full with no signs of nystagmus  CN V:  Facial sensory is symmetric with no asymmetries.  CN VII:  Facial motor symmetric  CN VIII:  Gross hearing intact bilaterally  CN IX:  Palate elevates symmetrically  CN X:  Palate elevates symmetrically  CN XI:  Shoulder shrug symmetric  CN XII:  Tongue is midline on protrusion    Motor: (strength out of 5:  1= minimal movement, 2 = movement in plane of gravity, 3 = movement against gravity, 4 = movement against some resistance, 5 = full strength)    -Right Upper Ext: Proximal: 5 Distal: 5  -Left Upper Ext: Proximal: 5 Distal: 5    -Right Lower Ext: Proximal: 5 Distal: 5  -Left Lower Ext: Proximal: 5 Distal: 5    DTR:  -Right   Bicep: 2+ Tricep: 2+ Brachoradialis: 2+   Patella: 2+ Ankle: 2+ Neg Babinski  -Left   Bicep: 2+ Tricep: 2+ Brachoradialis: 2+   Patella: 2+ Ankle: 2+ Neg Babinski    Sensory:  -Intact to light touch, pinprick    Coordination:  -Finger-to-nose intact  -Heel-to-shin intact  -No ataxia  -Occasional eye blinks noted    Gait  Deferred for patient safety      Results Review:  Lab Results (last 24 hours)       Procedure Component Value Units Date/Time    CBC (No Diff) [[id number]]  (Abnormal) Collected: 12/02/23 0761     Specimen: Blood Updated: 12/02/23 0903     WBC 11.06 10*3/mm3      RBC 5.20 10*6/mm3      Hemoglobin 13.7 g/dL      Hematocrit 41.5 %      MCV 79.8 fL      MCH 26.3 pg      MCHC 33.0 g/dL      RDW 12.9 %      RDW-SD 36.8 fl      MPV 11.7 fL      Platelets 215 10*3/mm3     Hemoglobin A1c [[id number]]  (Normal) Collected: 12/02/23 0731    Specimen: Blood Updated: 12/02/23 0807     Hemoglobin A1C 5.30 %     Narrative:      Hemoglobin A1C Ranges:    Increased Risk for Diabetes  5.7% to 6.4%  Diabetes                     >= 6.5%  Diabetic Goal                < 7.0%    High Sensitivity Troponin T 2Hr [[id number]]  (Abnormal) Collected: 12/02/23 0407    Specimen: Blood Updated: 12/02/23 0442     HS Troponin T 12 ng/L      Troponin T Delta 5 ng/L     Narrative:      High Sensitive Troponin T Reference Range:  <14.0 ng/L- Negative Female for AMI  <22.0 ng/L- Negative Male for AMI  >=14 - Abnormal Female indicating possible myocardial injury.  >=22 - Abnormal Male indicating possible myocardial injury.   Clinicians would have to utilize clinical acumen, EKG, Troponin, and serial changes to determine if it is an Acute Myocardial Infarction or myocardial injury due to an underlying chronic condition.         Basic Metabolic Panel [[id number]]  (Abnormal) Collected: 12/02/23 0407    Specimen: Blood Updated: 12/02/23 0438     Glucose 106 mg/dL      BUN 8 mg/dL      Creatinine 0.86 mg/dL      Sodium 141 mmol/L      Potassium 3.6 mmol/L      Chloride 107 mmol/L      CO2 24.7 mmol/L      Calcium 8.7 mg/dL      BUN/Creatinine Ratio 9.3     Anion Gap 9.3 mmol/L      eGFR 123.2 mL/min/1.73     Narrative:      GFR Normal >60  Chronic Kidney Disease <60  Kidney Failure <15      Urine Drug Screen - Urine, Clean Catch [[id number]]  (Abnormal) Collected: 12/02/23 0249    Specimen: Urine, Clean Catch Updated: 12/02/23 0324     Amphet/Methamphet, Screen Negative     Barbiturates Screen, Urine Negative     Benzodiazepine Screen, Urine Negative      Cocaine Screen, Urine Negative     Opiate Screen Positive     THC, Screen, Urine Negative     Methadone Screen, Urine Negative     Oxycodone Screen, Urine Negative     Fentanyl, Urine Negative    Narrative:      Negative Thresholds Per Drugs Screened:    Amphetamines                 500 ng/ml  Barbiturates                 200 ng/ml  Benzodiazepines              100 ng/ml  Cocaine                      300 ng/ml  Methadone                    300 ng/ml  Opiates                      300 ng/ml  Oxycodone                    100 ng/ml  THC                           50 ng/ml  Fentanyl                       5 ng/ml      The Normal Value for all drugs tested is negative. This report includes final unconfirmed screening results to be used for medical treatment purposes only. Unconfirmed results must not be used for non-medical purposes such as employment or legal testing. Clinical consideration should be applied to any drug of abuse test, particularly when unconfirmed results are used.            Urinalysis With Microscopic If Indicated (No Culture) - Urine, Clean Catch [[id number]]  (Abnormal) Collected: 12/02/23 0249    Specimen: Urine, Clean Catch Updated: 12/02/23 0305     Color, UA Yellow     Appearance, UA Clear     pH, UA 7.0     Specific Gravity, UA 1.025     Glucose, UA Negative     Ketones, UA Trace     Bilirubin, UA Negative     Blood, UA Negative     Protein, UA Trace     Leuk Esterase, UA Negative     Nitrite, UA Negative     Urobilinogen, UA 1.0 E.U./dL    Narrative:      Urine microscopic not indicated.    High Sensitivity Troponin T [[id number]]  (Normal) Collected: 12/02/23 0135    Specimen: Blood Updated: 12/02/23 0224     HS Troponin T 7 ng/L     Narrative:      High Sensitive Troponin T Reference Range:  <14.0 ng/L- Negative Female for AMI  <22.0 ng/L- Negative Male for AMI  >=14 - Abnormal Female indicating possible myocardial injury.  >=22 - Abnormal Male indicating possible myocardial injury.   Clinicians  would have to utilize clinical acumen, EKG, Troponin, and serial changes to determine if it is an Acute Myocardial Infarction or myocardial injury due to an underlying chronic condition.         TSH [[id number]]  (Normal) Collected: 12/02/23 0135    Specimen: Blood Updated: 12/02/23 0224     TSH 1.960 uIU/mL     T4, Free [[id number]]  (Normal) Collected: 12/02/23 0135    Specimen: Blood Updated: 12/02/23 0224     Free T4 1.16 ng/dL     Narrative:      Results may be falsely increased if patient taking Biotin.      Lipase [[id number]]  (Normal) Collected: 12/02/23 0135    Specimen: Blood Updated: 12/02/23 0223     Lipase 17 U/L     Comprehensive Metabolic Panel [[id number]]  (Abnormal) Collected: 12/02/23 0135    Specimen: Blood Updated: 12/02/23 0223     Glucose 123 mg/dL      BUN 9 mg/dL      Creatinine 0.89 mg/dL      Sodium 139 mmol/L      Potassium 3.8 mmol/L      Comment: Slight hemolysis detected by analyzer. Result may be falsely elevated.        Chloride 106 mmol/L      CO2 23.6 mmol/L      Calcium 9.4 mg/dL      Total Protein 7.3 g/dL      Albumin 4.4 g/dL      ALT (SGPT) 38 U/L      AST (SGOT) 29 U/L      Alkaline Phosphatase 82 U/L      Total Bilirubin 0.3 mg/dL      Globulin 2.9 gm/dL      A/G Ratio 1.5 g/dL      BUN/Creatinine Ratio 10.1     Anion Gap 9.4 mmol/L      eGFR 122.0 mL/min/1.73     Narrative:      GFR Normal >60  Chronic Kidney Disease <60  Kidney Failure <15      Ethanol [[id number]] Collected: 12/02/23 0135    Specimen: Blood Updated: 12/02/23 0223     Ethanol <10 mg/dL      Ethanol % <0.010 %     Magnesium [[id number]]  (Normal) Collected: 12/02/23 0135    Specimen: Blood Updated: 12/02/23 0223     Magnesium 2.3 mg/dL     CK [[id number]]  (Abnormal) Collected: 12/02/23 0135    Specimen: Blood Updated: 12/02/23 0223     Creatine Kinase 219 U/L     Protime-INR [[id number]]  (Abnormal) Collected: 12/02/23 0135    Specimen: Blood Updated: 12/02/23 0216     Protime 14.5 Seconds      INR 1.12    CBC &  Differential [[id number]]  (Abnormal) Collected: 12/02/23 0135    Specimen: Blood Updated: 12/02/23 0159    Narrative:      The following orders were created for panel order CBC & Differential.  Procedure                               Abnormality         Status                     ---------                               -----------         ------                     CBC Auto Differential[[id number]]        Abnormal            Final result                 Please view results for these tests on the individual orders.    CBC Auto Differential [[id number]]  (Abnormal) Collected: 12/02/23 0135    Specimen: Blood Updated: 12/02/23 0159     WBC 10.93 10*3/mm3      RBC 5.65 10*6/mm3      Hemoglobin 14.9 g/dL      Hematocrit 45.2 %      MCV 80.0 fL      MCH 26.4 pg      MCHC 33.0 g/dL      RDW 12.9 %      RDW-SD 36.4 fl      MPV 11.3 fL      Platelets 234 10*3/mm3      Neutrophil % 52.2 %      Lymphocyte % 31.4 %      Monocyte % 8.5 %      Eosinophil % 4.0 %      Basophil % 1.1 %      Immature Grans % 2.8 %      Neutrophils, Absolute 5.70 10*3/mm3      Lymphocytes, Absolute 3.43 10*3/mm3      Monocytes, Absolute 0.93 10*3/mm3      Eosinophils, Absolute 0.44 10*3/mm3      Basophils, Absolute 0.12 10*3/mm3      Immature Grans, Absolute 0.31 10*3/mm3      nRBC 0.0 /100 WBC             .  Imaging Results (Last 24 Hours)       Procedure Component Value Units Date/Time    MRI Brain Without Contrast [[id number]] Resulted: 12/02/23 1059     Updated: 12/02/23 1059    CT Cervical Spine Without Contrast [[id number]] Collected: 12/02/23 0309     Updated: 12/02/23 0309    Narrative:        Patient: IBIS UREÑA  Time Out: 03:08  Exam(s): CT C SPINE     EXAM:    CT Cervical Spine Without Intravenous Contrast    CLINICAL HISTORY:     Reason for exam: Pain after fall.    TECHNIQUE:    Axial computed tomography images of the cervical spine without   intravenous contrast.  CTDI is 19.8 mGy and DLP is 420.9 mGy-cm.  This CT   exam was performed  according to the principle of ALARA (As Low As   Reasonably Achievable) by using one or more of the following dose   reduction techniques: automated exposure control, adjustment of the mA   and or kV according to patient size, and or use of iterative   reconstruction technique.    COMPARISON:    No relevant prior studies available.    FINDINGS:    Vertebrae:  Unremarkable.  No acute fracture.    Discs spinal canal neural foramina:  No acute findings.  No spinal   canal stenosis.    Soft tissues:  Unremarkable.    IMPRESSION:         Normal cervical spine CT.      Impression:          Electronically signed by Jevon Wall MD on 12-02-23 at 0308    CT Lumbar Spine Without Contrast [[id number]] Collected: 12/02/23 0308     Updated: 12/02/23 0308    Narrative:        Patient: ADELITA IBIS  Time Out: 03:08  Exam(s): CT L SPINE     EXAM:    CT Lumbar Spine Without Intravenous Contrast    CLINICAL HISTORY:     Reason for exam: Fall.    TECHNIQUE:    Axial computed tomography images of the lumbar spine without   intravenous contrast.  CTDI is 30.16 mGy and DLP is 839.4 mGy-cm.  This   CT exam was performed according to the principle of ALARA (As Low As   Reasonably Achievable) by using one or more of the following dose   reduction techniques: automated exposure control, adjustment of the mA   and or kV according to patient size, and or use of iterative   reconstruction technique.    COMPARISON:    No relevant prior studies available.    FINDINGS:    Vertebrae:  Unremarkable.  No acute fracture.    Discs spinal canal neural foramina:  No acute findings.  No spinal   canal stenosis.    Soft tissues:  Unremarkable.    IMPRESSION:         Normal lumbar spine CT.      Impression:          Electronically signed by Jevon Wall MD on 12-02-23 at 0308    CT Head Without Contrast [[id number]] Collected: 12/02/23 0308     Updated: 12/02/23 0308    Narrative:        Patient: IBIS UREÑA  Time Out: 03:07  Exam(s): CT  HEAD Without Contrast     EXAM:    CT Head Without Intravenous Contrast    CLINICAL HISTORY:     Reason for exam: New onset seizure.    TECHNIQUE:    Axial computed tomography images of the head brain without intravenous   contrast.  CTDI is 54.92 mGy and DLP is 1024.1 mGy-cm.  This CT exam was   performed according to the principle of ALARA (As Low As Reasonably   Achievable) by using one or more of the following dose reduction   techniques: automated exposure control, adjustment of the mA and or kV   according to patient size, and or use of iterative reconstruction   technique.    COMPARISON:    No relevant prior studies available.    FINDINGS:    Brain:  Unremarkable.  No hemorrhage.  No significant white matter   disease.  No edema.    Ventricles:  Unremarkable.  No ventriculomegaly.    Bones joints:  Unremarkable.  No acute fracture.    Soft tissues:  Unremarkable.    Sinuses:  Unremarkable as visualized.  No acute sinusitis.    Mastoid air cells:  Unremarkable as visualized.  No mastoid effusion.    IMPRESSION:         Normal head brain CT.      Impression:          Electronically signed by Jevon Wall MD on 12-02-23 at 0307    CT Facial Bones Without Contrast [[id number]] Collected: 12/02/23 0304     Updated: 12/02/23 0304    Narrative:        Patient: IBIS UREÑA  Time Out: 03:03  Exam(s): CT FACIAL Without Contrast     EXAM:    CT Maxillofacial Without Intravenous Contrast    CLINICAL HISTORY:     Reason for exam: Fall with left orbit trauma.    TECHNIQUE:    Axial computed tomography images of the face without intravenous   contrast.  CTDI is 48.25 mGy and DLP is 996 mGy-cm.  This CT exam was   performed according to the principle of ALARA (As Low As Reasonably   Achievable) by using one or more of the following dose reduction   techniques: automated exposure control, adjustment of the mA and or kV   according to patient size, and or use of iterative reconstruction   technique.    COMPARISON:     No relevant prior studies available.    FINDINGS:    Bones joints:  No acute fracture.    Soft tissues:  Unremarkable.    Orbits:  Unremarkable.    Sinuses:  Bilateral maxillary sinus inflammatory polyps.  No air-fluid   levels.    IMPRESSION:         No acute findings in the face.      Impression:          Electronically signed by Jevon Wall MD on 12-02-23 at 0303          TSH normal    Impression    23-year-old man with past medical history of asthma, allergies, alpha thalassemia trait, erosive gastritis, ADHD.  Presents after a episode concerning for and unprovoked tonic-clonic seizure.  History with elements that are concerning for possible RIGOBERTO.    Plan    Brain MRI with and without contrast  EEG  Will continue Keppra 500 mg IV twice daily for now  Check B12, folate, repeat CPK  Depending on results of above may need to consider sleep EEG, lumbar puncture, holding for now.  Will follow along with you    I discussed the patient's findings and my recommendations with patient and family    Shea Perry MD  12/02/23  11:19 EST

## 2023-12-02 NOTE — ED NOTES
Nursing report ED to floor  Brandon Marsh  23 y.o.  male    HPI :   Chief Complaint   Patient presents with    Seizures     Brandon Marsh is a 23 y.o. male who presents to the ED via EMS from home for new onset seizure.  The patient states he was playing a video game with his friends and the next thing he remembers is waking up on the floor with EMS around him.  The patient's father states he was at home and heard a loud thump and went upstairs to check on the patient and found him lying facedown with tonic-clonic activity.  He states the patient hit his head and face on a table.  He states the patient was confused but is now back to baseline.  The patient complains of head, face and low back pain.  The patient denies any alcohol or drug use.  He denies a history of seizure disorder.     Admitting doctor:   Shin Smith MD    Admitting diagnosis:   The encounter diagnosis was New onset seizure.    Code status:   Current Code Status       Date Active Code Status Order ID Comments User Context       12/2/2023 0354 CPR (Attempt to Resuscitate) [id number]  Diana De La Torre APRN ED        Question Answer    Code Status (Patient has no pulse and is not breathing) CPR (Attempt to Resuscitate)    Medical Interventions (Patient has pulse or is breathing) Full Support                    Allergies:   Nuts    Isolation:   No active isolations    Intake and Output    Intake/Output Summary (Last 24 hours) at 12/2/2023 0356  Last data filed at 12/2/2023 0221  Gross per 24 hour   Intake 1000 ml   Output --   Net 1000 ml       Weight:       12/02/23  0104   Weight: 102 kg (225 lb)       Most recent vitals:   Vitals:    12/02/23 0313 12/02/23 0315 12/02/23 0329 12/02/23 0331   BP:    133/77   Pulse:  94  94   Resp:       Temp:       SpO2: 95% 93% 95%    Weight:       Height:           Active LDAs/IV Access:   Lines, Drains & Airways       Active LDAs       Name Placement date Placement time Site Days    Peripheral IV  12/02/23 Left Antecubital 12/02/23  --  Antecubital  less than 1                    Labs (abnormal labs have a star):   Labs Reviewed   COMPREHENSIVE METABOLIC PANEL - Abnormal; Notable for the following components:       Result Value    Glucose 123 (*)     All other components within normal limits    Narrative:     GFR Normal >60  Chronic Kidney Disease <60  Kidney Failure <15     PROTIME-INR - Abnormal; Notable for the following components:    Protime 14.5 (*)     INR 1.12 (*)     All other components within normal limits   URINALYSIS W/ MICROSCOPIC IF INDICATED (NO CULTURE) - Abnormal; Notable for the following components:    Ketones, UA Trace (*)     Protein, UA Trace (*)     All other components within normal limits    Narrative:     Urine microscopic not indicated.   URINE DRUG SCREEN - Abnormal; Notable for the following components:    Opiate Screen Positive (*)     All other components within normal limits    Narrative:     Negative Thresholds Per Drugs Screened:    Amphetamines                 500 ng/ml  Barbiturates                 200 ng/ml  Benzodiazepines              100 ng/ml  Cocaine                      300 ng/ml  Methadone                    300 ng/ml  Opiates                      300 ng/ml  Oxycodone                    100 ng/ml  THC                           50 ng/ml  Fentanyl                       5 ng/ml      The Normal Value for all drugs tested is negative. This report includes final unconfirmed screening results to be used for medical treatment purposes only. Unconfirmed results must not be used for non-medical purposes such as employment or legal testing. Clinical consideration should be applied to any drug of abuse test, particularly when unconfirmed results are used.           CK - Abnormal; Notable for the following components:    Creatine Kinase 219 (*)     All other components within normal limits   CBC WITH AUTO DIFFERENTIAL - Abnormal; Notable for the following components:    WBC  10.93 (*)     MCH 26.4 (*)     RDW-SD 36.4 (*)     Immature Grans % 2.8 (*)     Lymphocytes, Absolute 3.43 (*)     Monocytes, Absolute 0.93 (*)     Eosinophils, Absolute 0.44 (*)     Immature Grans, Absolute 0.31 (*)     All other components within normal limits   LIPASE - Normal   TROPONIN - Normal    Narrative:     High Sensitive Troponin T Reference Range:  <14.0 ng/L- Negative Female for AMI  <22.0 ng/L- Negative Male for AMI  >=14 - Abnormal Female indicating possible myocardial injury.  >=22 - Abnormal Male indicating possible myocardial injury.   Clinicians would have to utilize clinical acumen, EKG, Troponin, and serial changes to determine if it is an Acute Myocardial Infarction or myocardial injury due to an underlying chronic condition.        MAGNESIUM - Normal   TSH - Normal   T4, FREE - Normal    Narrative:     Results may be falsely increased if patient taking Biotin.     ETHANOL   HIGH SENSITIVITIY TROPONIN T 2HR   BASIC METABOLIC PANEL   CBC (NO DIFF)   HEMOGLOBIN A1C   CBC AND DIFFERENTIAL    Narrative:     The following orders were created for panel order CBC & Differential.  Procedure                               Abnormality         Status                     ---------                               -----------         ------                     CBC Auto Differential[[id number]]        Abnormal            Final result                 Please view results for these tests on the individual orders.       EKG:   ECG 12 Lead Stroke Evaluation   Preliminary Result   HEART RATE= 93  bpm   RR Interval= 645  ms   WV Interval= 172  ms   P Horizontal Axis= -62  deg   P Front Axis= 103  deg   QRSD Interval= 80  ms   QT Interval= 329  ms   QTcB= 410  ms   QRS Axis= 52  deg   T Wave Axis= 49  deg   - BORDERLINE ECG -   Sinus rhythm   Probable left atrial enlargement   Electronically Signed By:    Date and Time of Study: 2023-12-02 01:37:18          Meds given in ED:   Medications   sodium chloride 0.9 % flush 10  mL (has no administration in time range)   sodium chloride 0.9 % flush 10 mL (has no administration in time range)   sodium chloride 0.9 % infusion 40 mL (has no administration in time range)   nitroglycerin (NITROSTAT) SL tablet 0.4 mg (has no administration in time range)   sodium chloride 0.9 % infusion (has no administration in time range)   acetaminophen (TYLENOL) tablet 650 mg (has no administration in time range)     Or   acetaminophen (TYLENOL) 160 MG/5ML oral solution 650 mg (has no administration in time range)     Or   acetaminophen (TYLENOL) suppository 650 mg (has no administration in time range)   ondansetron (ZOFRAN) tablet 4 mg (has no administration in time range)     Or   ondansetron (ZOFRAN) injection 4 mg (has no administration in time range)   calcium carbonate (TUMS) chewable tablet 500 mg (200 mg elemental) (has no administration in time range)   LORazepam (ATIVAN) injection 1 mg (has no administration in time range)   lactated ringers bolus 1,000 mL (0 mL Intravenous Stopped 12/2/23 0221)   levETIRAcetam (KEPPRA) injection 1,000 mg (1,000 mg Intravenous Given 12/2/23 0202)   ondansetron (ZOFRAN) injection 4 mg (4 mg Intravenous Given 12/2/23 0202)   morphine injection 4 mg (4 mg Intravenous Given 12/2/23 0203)       Imaging results:  CT Cervical Spine Without Contrast    Result Date: 12/2/2023  Electronically signed by Jevon Wall MD on 12-02-23 at 0308    CT Lumbar Spine Without Contrast    Result Date: 12/2/2023  Electronically signed by Jevon Wall MD on 12-02-23 at 0308    CT Head Without Contrast    Result Date: 12/2/2023  Electronically signed by Jevon Wall MD on 12-02-23 at 0307    CT Facial Bones Without Contrast    Result Date: 12/2/2023  Electronically signed by Jevon Wall MD on 12-02-23 at 0303     Ambulatory status:   - Ax2, recent onset of seizures     Social issues:   Social History     Socioeconomic History    Marital status: Single   Tobacco  Use    Smoking status: Never    Smokeless tobacco: Never   Vaping Use    Vaping Use: Never used   Substance and Sexual Activity    Alcohol use: Never    Drug use: Never    Sexual activity: Never       NIH Stroke Scale:       Anastasia Shah RN  12/02/23 03:56 EST     Call Anastasia #[id number] with any questions

## 2023-12-02 NOTE — H&P
Patient Name:  Brandon Marsh  YOB: 2000  MRN:  [id number]  Admit Date:  12/2/2023  Patient Care Team:  Pamela Ojeda MD as PCP - General (Internal Medicine)  Gus Funes MD as Consulting Physician (Hematology and Oncology)  Everett Dodd MD as Referring Physician (Internal Medicine)      Subjective   History Present Illness     Chief Complaint   Patient presents with    Seizures       Mr. Marsh is a 23 y.o. with a history of asthma and GERD who presents to [facility] after having a witnessed seizure by his family.  The patient was in his room playing video game when family heard a thump.  They went to his room and found him with his head near the TV stand/glass stand and having generalized convulsions.  He did bite his tongue and had convulsions for a few minutes.  After he is slowly awoke and EMS arrived he had drowsiness and confusion.  He has had slow improvement of his mentation overnight and is now aware of self and surroundings.  The patient does not have any memory of the event or directly before.  He remembered being in his room playing video game and then next thing was he was on the floor surrounded by EMS.  He has left-sided facial pain where there is swelling and a forehead abrasion.  He is not reporting any neck pain.  He has low back pain.  He is not reporting any chest pain palpitations shortness of breath.  No nausea vomiting diarrhea or abdominal pain.  Is not reporting any dysuria or flank pain.  The game he was playing was fortnight and he does not report any prior seizure.  Mother reported that he did have a febrile seizure as a child to neurology.    Review of Systems   Constitutional:  Negative for chills and fever.   HENT:  Negative for sore throat and trouble swallowing.    Eyes:  Negative for photophobia and visual disturbance.   Respiratory:  Negative for cough, shortness of breath and wheezing.    Cardiovascular:  Negative for chest pain,  palpitations and leg swelling.   Gastrointestinal:  Negative for constipation, diarrhea, nausea and vomiting.   Endocrine: Negative for cold intolerance and heat intolerance.   Genitourinary:  Negative for dysuria and flank pain.   Musculoskeletal:  Positive for back pain. Negative for neck pain and neck stiffness.   Skin:  Negative for pallor and rash.   Allergic/Immunologic: Negative for environmental allergies and food allergies.   Neurological:  Positive for seizures and syncope.   Hematological:  Negative for adenopathy. Does not bruise/bleed easily.   Psychiatric/Behavioral:  Positive for confusion. Negative for agitation.         Personal History     Past Medical History:   Diagnosis Date    ADHD (attention deficit hyperactivity disorder)     Not currently on medication    Allergic     Asthma     Clotting disorder     Alpha Thalassemia Trait    COVID-19 virus detected 12/22/2022    Gastric hemorrhage due to erosive gastritis 08/31/2022    GERD (gastroesophageal reflux disease)     Seasonal allergies     Tachycardia 03/01/2023     Past Surgical History:   Procedure Laterality Date    COLONOSCOPY      ENDOSCOPY N/A     ENDOSCOPY N/A 11/17/2022    Procedure: ESOPHAGOGASTRODUODENOSCOPY with bx;  Surgeon: Ronak Rodriguez MD;  Location: [facility] ENDOSCOPY;  Service: Gastroenterology;  Laterality: N/A;  pre: GERD  post: gastritis, esophagitis     UPPER GASTROINTESTINAL ENDOSCOPY       Family History   Problem Relation Age of Onset    Arthritis Mother     Cervical cancer Mother     Cancer Mother         Cervical Carcinoma    Hyperlipidemia Father     Arthritis Father     Depression Father     Anxiety disorder Father     Heart disease Father         Afib    Hypertension Father     Mental illness Father         Bipolar 1    Anxiety disorder Sister     Depression Sister     Breast cancer Maternal Aunt     Asthma Maternal Aunt     Lung cancer Paternal Grandmother     Cancer Paternal Grandmother         Small Cell  Lung Cancer, neuroendocrine tumor in stomach, Basal Cell Carcinoma    COPD Paternal Grandmother     Depression Paternal Grandmother     Heart disease Paternal Grandmother     Vision loss Maternal Grandfather         Glaucoma    Arthritis Maternal Grandmother     Depression Maternal Grandmother     Drug abuse Paternal Aunt      Social History     Tobacco Use    Smoking status: Never    Smokeless tobacco: Never   Vaping Use    Vaping Use: Never used   Substance Use Topics    Alcohol use: Never    Drug use: Never     No current facility-administered medications on file prior to encounter.     Current Outpatient Medications on File Prior to Encounter   Medication Sig Dispense Refill    cetirizine (zyrTEC) 10 MG tablet Take 1 tablet by mouth Daily. 90 tablet 3    lansoprazole (Prevacid) 30 MG capsule Take 1 capsule by mouth Daily. 90 capsule 3    levalbuterol (XOPENEX HFA) 45 MCG/ACT inhaler Inhale 1-2 puffs Every 4 (Four) Hours As Needed for Wheezing. 15 g 6    levalbuterol (XOPENEX) 0.63 MG/3ML nebulizer solution Take 1 ampule by nebulization Every 4 (Four) Hours As Needed for Wheezing or Shortness of Air. 180 mL 5    montelukast (SINGULAIR) 10 MG tablet Take 1 tablet by mouth every night at bedtime. 90 tablet 3     Allergies   Allergen Reactions    Nuts Shortness Of Breath     tree       Objective    Objective     Vital Signs  Temp:  [97.7 °F (36.5 °C)-99.5 °F (37.5 °C)] 99.3 °F (37.4 °C)  Heart Rate:  [] 100  Resp:  [18-20] 18  BP: (116-158)/(59-97) 116/61  SpO2:  [92 %-98 %] 95 %  on   ;   Device (Oxygen Therapy): room air  Body mass index is 33.23 kg/m².    Physical Exam  Vitals and nursing note reviewed.   Constitutional:       General: He is not in acute distress.     Appearance: He is not diaphoretic.   HENT:      Head:      Comments: Swelling around his left eye but able to open.  He also has some swelling on left side of his forehead with abrasion  Eyes:      Conjunctiva/sclera: Conjunctivae normal.       Pupils: Pupils are equal, round, and reactive to light.   Cardiovascular:      Rate and Rhythm: Normal rate and regular rhythm.      Pulses: Normal pulses.   Pulmonary:      Effort: Pulmonary effort is normal.      Breath sounds: No wheezing or rhonchi.   Abdominal:      General: There is no distension.      Palpations: Abdomen is soft.      Tenderness: There is no abdominal tenderness. There is no guarding or rebound.   Musculoskeletal:         General: No swelling or tenderness.   Skin:     General: Skin is warm and dry.   Neurological:      Mental Status: He is alert.      Cranial Nerves: No cranial nerve deficit.   Psychiatric:         Mood and Affect: Mood normal.         Behavior: Behavior normal.         Results Review:  I reviewed the patient's new clinical results.  I reviewed the patient's new imaging results and agree with the interpretation.  I personally viewed and interpreted the patient's EKG/Telemetry data  I reviewed prior records.    Lab Results (last 24 hours)       Procedure Component Value Units Date/Time    CBC & Differential [[id number]]  (Abnormal) Collected: 12/02/23 0135    Specimen: Blood Updated: 12/02/23 0159    Narrative:      The following orders were created for panel order CBC & Differential.  Procedure                               Abnormality         Status                     ---------                               -----------         ------                     CBC Auto Differential[[id number]]        Abnormal            Final result                 Please view results for these tests on the individual orders.    Comprehensive Metabolic Panel [[id number]]  (Abnormal) Collected: 12/02/23 0135    Specimen: Blood Updated: 12/02/23 0223     Glucose 123 mg/dL      BUN 9 mg/dL      Creatinine 0.89 mg/dL      Sodium 139 mmol/L      Potassium 3.8 mmol/L      Comment: Slight hemolysis detected by analyzer. Result may be falsely elevated.        Chloride 106 mmol/L      CO2 23.6 mmol/L       Calcium 9.4 mg/dL      Total Protein 7.3 g/dL      Albumin 4.4 g/dL      ALT (SGPT) 38 U/L      AST (SGOT) 29 U/L      Alkaline Phosphatase 82 U/L      Total Bilirubin 0.3 mg/dL      Globulin 2.9 gm/dL      A/G Ratio 1.5 g/dL      BUN/Creatinine Ratio 10.1     Anion Gap 9.4 mmol/L      eGFR 122.0 mL/min/1.73     Narrative:      GFR Normal >60  Chronic Kidney Disease <60  Kidney Failure <15      Protime-INR [[id number]]  (Abnormal) Collected: 12/02/23 0135    Specimen: Blood Updated: 12/02/23 0216     Protime 14.5 Seconds      INR 1.12    Lipase [[id number]]  (Normal) Collected: 12/02/23 0135    Specimen: Blood Updated: 12/02/23 0223     Lipase 17 U/L     High Sensitivity Troponin T [[id number]]  (Normal) Collected: 12/02/23 0135    Specimen: Blood Updated: 12/02/23 0224     HS Troponin T 7 ng/L     Narrative:      High Sensitive Troponin T Reference Range:  <14.0 ng/L- Negative Female for AMI  <22.0 ng/L- Negative Male for AMI  >=14 - Abnormal Female indicating possible myocardial injury.  >=22 - Abnormal Male indicating possible myocardial injury.   Clinicians would have to utilize clinical acumen, EKG, Troponin, and serial changes to determine if it is an Acute Myocardial Infarction or myocardial injury due to an underlying chronic condition.         Ethanol [[id number]] Collected: 12/02/23 0135    Specimen: Blood Updated: 12/02/23 0223     Ethanol <10 mg/dL      Ethanol % <0.010 %     Magnesium [[id number]]  (Normal) Collected: 12/02/23 0135    Specimen: Blood Updated: 12/02/23 0223     Magnesium 2.3 mg/dL     TSH [[id number]]  (Normal) Collected: 12/02/23 0135    Specimen: Blood Updated: 12/02/23 0224     TSH 1.960 uIU/mL     T4, Free [[id number]]  (Normal) Collected: 12/02/23 0135    Specimen: Blood Updated: 12/02/23 0224     Free T4 1.16 ng/dL     Narrative:      Results may be falsely increased if patient taking Biotin.      CK [[id number]]  (Abnormal) Collected: 12/02/23 0135    Specimen: Blood Updated: 12/02/23  0223     Creatine Kinase 219 U/L     CBC Auto Differential [[id number]]  (Abnormal) Collected: 12/02/23 0135    Specimen: Blood Updated: 12/02/23 0159     WBC 10.93 10*3/mm3      RBC 5.65 10*6/mm3      Hemoglobin 14.9 g/dL      Hematocrit 45.2 %      MCV 80.0 fL      MCH 26.4 pg      MCHC 33.0 g/dL      RDW 12.9 %      RDW-SD 36.4 fl      MPV 11.3 fL      Platelets 234 10*3/mm3      Neutrophil % 52.2 %      Lymphocyte % 31.4 %      Monocyte % 8.5 %      Eosinophil % 4.0 %      Basophil % 1.1 %      Immature Grans % 2.8 %      Neutrophils, Absolute 5.70 10*3/mm3      Lymphocytes, Absolute 3.43 10*3/mm3      Monocytes, Absolute 0.93 10*3/mm3      Eosinophils, Absolute 0.44 10*3/mm3      Basophils, Absolute 0.12 10*3/mm3      Immature Grans, Absolute 0.31 10*3/mm3      nRBC 0.0 /100 WBC     Urinalysis With Microscopic If Indicated (No Culture) - Urine, Clean Catch [[id number]]  (Abnormal) Collected: 12/02/23 0249    Specimen: Urine, Clean Catch Updated: 12/02/23 0305     Color, UA Yellow     Appearance, UA Clear     pH, UA 7.0     Specific Gravity, UA 1.025     Glucose, UA Negative     Ketones, UA Trace     Bilirubin, UA Negative     Blood, UA Negative     Protein, UA Trace     Leuk Esterase, UA Negative     Nitrite, UA Negative     Urobilinogen, UA 1.0 E.U./dL    Narrative:      Urine microscopic not indicated.    Urine Drug Screen - Urine, Clean Catch [[id number]]  (Abnormal) Collected: 12/02/23 0249    Specimen: Urine, Clean Catch Updated: 12/02/23 0324     Amphet/Methamphet, Screen Negative     Barbiturates Screen, Urine Negative     Benzodiazepine Screen, Urine Negative     Cocaine Screen, Urine Negative     Opiate Screen Positive     THC, Screen, Urine Negative     Methadone Screen, Urine Negative     Oxycodone Screen, Urine Negative     Fentanyl, Urine Negative    Narrative:      Negative Thresholds Per Drugs Screened:    Amphetamines                 500 ng/ml  Barbiturates                 200  ng/ml  Benzodiazepines              100 ng/ml  Cocaine                      300 ng/ml  Methadone                    300 ng/ml  Opiates                      300 ng/ml  Oxycodone                    100 ng/ml  THC                           50 ng/ml  Fentanyl                       5 ng/ml      The Normal Value for all drugs tested is negative. This report includes final unconfirmed screening results to be used for medical treatment purposes only. Unconfirmed results must not be used for non-medical purposes such as employment or legal testing. Clinical consideration should be applied to any drug of abuse test, particularly when unconfirmed results are used.            High Sensitivity Troponin T 2Hr [[id number]]  (Abnormal) Collected: 12/02/23 0407    Specimen: Blood Updated: 12/02/23 0442     HS Troponin T 12 ng/L      Troponin T Delta 5 ng/L     Narrative:      High Sensitive Troponin T Reference Range:  <14.0 ng/L- Negative Female for AMI  <22.0 ng/L- Negative Male for AMI  >=14 - Abnormal Female indicating possible myocardial injury.  >=22 - Abnormal Male indicating possible myocardial injury.   Clinicians would have to utilize clinical acumen, EKG, Troponin, and serial changes to determine if it is an Acute Myocardial Infarction or myocardial injury due to an underlying chronic condition.         Basic Metabolic Panel [[id number]]  (Abnormal) Collected: 12/02/23 0407    Specimen: Blood Updated: 12/02/23 0438     Glucose 106 mg/dL      BUN 8 mg/dL      Creatinine 0.86 mg/dL      Sodium 141 mmol/L      Potassium 3.6 mmol/L      Chloride 107 mmol/L      CO2 24.7 mmol/L      Calcium 8.7 mg/dL      BUN/Creatinine Ratio 9.3     Anion Gap 9.3 mmol/L      eGFR 123.2 mL/min/1.73     Narrative:      GFR Normal >60  Chronic Kidney Disease <60  Kidney Failure <15      CBC (No Diff) [[id number]]  (Abnormal) Collected: 12/02/23 0731    Specimen: Blood Updated: 12/02/23 0903     WBC 11.06 10*3/mm3      RBC 5.20 10*6/mm3       Hemoglobin 13.7 g/dL      Hematocrit 41.5 %      MCV 79.8 fL      MCH 26.3 pg      MCHC 33.0 g/dL      RDW 12.9 %      RDW-SD 36.8 fl      MPV 11.7 fL      Platelets 215 10*3/mm3     Hemoglobin A1c [[id number]]  (Normal) Collected: 12/02/23 0731    Specimen: Blood Updated: 12/02/23 0807     Hemoglobin A1C 5.30 %     Narrative:      Hemoglobin A1C Ranges:    Increased Risk for Diabetes  5.7% to 6.4%  Diabetes                     >= 6.5%  Diabetic Goal                < 7.0%            Imaging Results (Last 24 Hours)       Procedure Component Value Units Date/Time    MRI Brain Without Contrast [[id number]] Resulted: 12/02/23 1059     Updated: 12/02/23 1133    CT Cervical Spine Without Contrast [[id number]] Collected: 12/02/23 0309     Updated: 12/02/23 0309    Narrative:        Patient: IBIS UREÑA  Time Out: 03:08  Exam(s): CT C SPINE     EXAM:    CT Cervical Spine Without Intravenous Contrast    CLINICAL HISTORY:     Reason for exam: Pain after fall.    TECHNIQUE:    Axial computed tomography images of the cervical spine without   intravenous contrast.  CTDI is 19.8 mGy and DLP is 420.9 mGy-cm.  This CT   exam was performed according to the principle of ALARA (As Low As   Reasonably Achievable) by using one or more of the following dose   reduction techniques: automated exposure control, adjustment of the mA   and or kV according to patient size, and or use of iterative   reconstruction technique.    COMPARISON:    No relevant prior studies available.    FINDINGS:    Vertebrae:  Unremarkable.  No acute fracture.    Discs spinal canal neural foramina:  No acute findings.  No spinal   canal stenosis.    Soft tissues:  Unremarkable.    IMPRESSION:         Normal cervical spine CT.      Impression:          Electronically signed by Jevon Wall MD on 12-02-23 at 0308    CT Lumbar Spine Without Contrast [[id number]] Collected: 12/02/23 0308     Updated: 12/02/23 0308    Narrative:        Patient: IBIS UREÑA   Time Out: 03:08  Exam(s): CT L SPINE     EXAM:    CT Lumbar Spine Without Intravenous Contrast    CLINICAL HISTORY:     Reason for exam: Fall.    TECHNIQUE:    Axial computed tomography images of the lumbar spine without   intravenous contrast.  CTDI is 30.16 mGy and DLP is 839.4 mGy-cm.  This   CT exam was performed according to the principle of ALARA (As Low As   Reasonably Achievable) by using one or more of the following dose   reduction techniques: automated exposure control, adjustment of the mA   and or kV according to patient size, and or use of iterative   reconstruction technique.    COMPARISON:    No relevant prior studies available.    FINDINGS:    Vertebrae:  Unremarkable.  No acute fracture.    Discs spinal canal neural foramina:  No acute findings.  No spinal   canal stenosis.    Soft tissues:  Unremarkable.    IMPRESSION:         Normal lumbar spine CT.      Impression:          Electronically signed by Jevon Wall MD on 12-02-23 at 0308    CT Head Without Contrast [[id number]] Collected: 12/02/23 0308     Updated: 12/02/23 0308    Narrative:        Patient: IBIS UREÑA  Time Out: 03:07  Exam(s): CT HEAD Without Contrast     EXAM:    CT Head Without Intravenous Contrast    CLINICAL HISTORY:     Reason for exam: New onset seizure.    TECHNIQUE:    Axial computed tomography images of the head brain without intravenous   contrast.  CTDI is 54.92 mGy and DLP is 1024.1 mGy-cm.  This CT exam was   performed according to the principle of ALARA (As Low As Reasonably   Achievable) by using one or more of the following dose reduction   techniques: automated exposure control, adjustment of the mA and or kV   according to patient size, and or use of iterative reconstruction   technique.    COMPARISON:    No relevant prior studies available.    FINDINGS:    Brain:  Unremarkable.  No hemorrhage.  No significant white matter   disease.  No edema.    Ventricles:  Unremarkable.  No ventriculomegaly.     Bones joints:  Unremarkable.  No acute fracture.    Soft tissues:  Unremarkable.    Sinuses:  Unremarkable as visualized.  No acute sinusitis.    Mastoid air cells:  Unremarkable as visualized.  No mastoid effusion.    IMPRESSION:         Normal head brain CT.      Impression:          Electronically signed by Jevon Wall MD on 12-02-23 at 0307    CT Facial Bones Without Contrast [[id number]] Collected: 12/02/23 0304     Updated: 12/02/23 0304    Narrative:        Patient: IBIS UREÑA  Time Out: 03:03  Exam(s): CT FACIAL Without Contrast     EXAM:    CT Maxillofacial Without Intravenous Contrast    CLINICAL HISTORY:     Reason for exam: Fall with left orbit trauma.    TECHNIQUE:    Axial computed tomography images of the face without intravenous   contrast.  CTDI is 48.25 mGy and DLP is 996 mGy-cm.  This CT exam was   performed according to the principle of ALARA (As Low As Reasonably   Achievable) by using one or more of the following dose reduction   techniques: automated exposure control, adjustment of the mA and or kV   according to patient size, and or use of iterative reconstruction   technique.    COMPARISON:    No relevant prior studies available.    FINDINGS:    Bones joints:  No acute fracture.    Soft tissues:  Unremarkable.    Orbits:  Unremarkable.    Sinuses:  Bilateral maxillary sinus inflammatory polyps.  No air-fluid   levels.    IMPRESSION:         No acute findings in the face.      Impression:          Electronically signed by Jevon Wall MD on 12-02-23 at 0303                ECG 12 Lead Stroke Evaluation   Preliminary Result   HEART RATE= 93  bpm   RR Interval= 645  ms   AK Interval= 172  ms   P Horizontal Axis= -62  deg   P Front Axis= 103  deg   QRSD Interval= 80  ms   QT Interval= 329  ms   QTcB= 410  ms   QRS Axis= 52  deg   T Wave Axis= 49  deg   - BORDERLINE ECG -   Sinus rhythm   Probable left atrial enlargement   Electronically Signed By:    Date and Time of Study:  2023-12-02 01:37:18      SCANNED - TELEMETRY     Final Result      SCANNED - TELEMETRY     Final Result           Assessment/Plan     Active Hospital Problems    Diagnosis  POA    **Seizure [R56.9]  Yes      Resolved Hospital Problems   No resolved problems to display.       Mr. Marsh is a 23 y.o.     Seizure: Continued Keppra.  MRI and EEG ordered.  Neurology consult.  Asthma: No acute bronchospasm on exam.  Resuming home regimen.  GERD: PPI  Low back pain: No radiation reported.  Continue symptom control and will monitor progression.  PPx: SCD  I discussed the patient's findings and my recommendations with patient and family.      Gavin Harley MD  [facility]  12/02/23  12:17 EST    Dictated portions of note using dragon dictation software.

## 2023-12-02 NOTE — PLAN OF CARE
Goal Outcome Evaluation:  Plan of Care Reviewed With: patient        Progress: no change  Outcome Evaluation: Pt admitted tonight for new onset seizures; Pt is alert and oriented; Has bruised left eye and abrsion to forehead; Neuro has been consulted to see pt; IVF started; SCD's in place; seizure precautions applied; will continue to monitor

## 2023-12-02 NOTE — ED TRIAGE NOTES
To ER via EMS from home.   Pt was in his bedroom and family heard a thump.  Family describes all over shaking x 2.    Pt possibly struck head .  Bed was found on side.      Pt c/o thoracic pain and pain to left eye.  Pt has blood on side of left eye.  No obvious laceration.      Pt is alert and oriented at this time.  Does not remember most of today.   Pt denies drugs or alcohol.

## 2023-12-02 NOTE — PLAN OF CARE
Goal Outcome Evaluation:  Plan of Care Reviewed With: patient        Progress: improving       Patient is alert x3. No seizure like activity is noted at this time.  Will continue to monitor.

## 2023-12-03 ENCOUNTER — READMISSION MANAGEMENT (OUTPATIENT)
Dept: CALL CENTER | Facility: HOSPITAL | Age: 23
End: 2023-12-03
Payer: COMMERCIAL

## 2023-12-03 VITALS
SYSTOLIC BLOOD PRESSURE: 124 MMHG | TEMPERATURE: 98.8 F | HEIGHT: 69 IN | RESPIRATION RATE: 16 BRPM | OXYGEN SATURATION: 96 % | HEART RATE: 96 BPM | WEIGHT: 225 LBS | DIASTOLIC BLOOD PRESSURE: 75 MMHG | BODY MASS INDEX: 33.33 KG/M2

## 2023-12-03 LAB
ANION GAP SERPL CALCULATED.3IONS-SCNC: 9 MMOL/L (ref 5–15)
BUN SERPL-MCNC: 8 MG/DL (ref 6–20)
BUN/CREAT SERPL: 9.1 (ref 7–25)
CALCIUM SPEC-SCNC: 8.9 MG/DL (ref 8.6–10.5)
CHLORIDE SERPL-SCNC: 107 MMOL/L (ref 98–107)
CK SERPL-CCNC: 154 U/L (ref 20–200)
CO2 SERPL-SCNC: 23 MMOL/L (ref 22–29)
CREAT SERPL-MCNC: 0.88 MG/DL (ref 0.76–1.27)
DEPRECATED RDW RBC AUTO: 35 FL (ref 37–54)
EGFRCR SERPLBLD CKD-EPI 2021: 122.4 ML/MIN/1.73
ERYTHROCYTE [DISTWIDTH] IN BLOOD BY AUTOMATED COUNT: 12.6 % (ref 12.3–15.4)
GLUCOSE SERPL-MCNC: 91 MG/DL (ref 65–99)
HCT VFR BLD AUTO: 43.5 % (ref 37.5–51)
HGB BLD-MCNC: 13.6 G/DL (ref 13–17.7)
MCH RBC QN AUTO: 24.4 PG (ref 26.6–33)
MCHC RBC AUTO-ENTMCNC: 31.3 G/DL (ref 31.5–35.7)
MCV RBC AUTO: 78.1 FL (ref 79–97)
PLATELET # BLD AUTO: 210 10*3/MM3 (ref 140–450)
PMV BLD AUTO: 11.5 FL (ref 6–12)
POTASSIUM SERPL-SCNC: 3.6 MMOL/L (ref 3.5–5.2)
RBC # BLD AUTO: 5.57 10*6/MM3 (ref 4.14–5.8)
SODIUM SERPL-SCNC: 139 MMOL/L (ref 136–145)
WBC NRBC COR # BLD AUTO: 8.79 10*3/MM3 (ref 3.4–10.8)

## 2023-12-03 PROCEDURE — 96376 TX/PRO/DX INJ SAME DRUG ADON: CPT

## 2023-12-03 PROCEDURE — 85027 COMPLETE CBC AUTOMATED: CPT | Performed by: INTERNAL MEDICINE

## 2023-12-03 PROCEDURE — G0378 HOSPITAL OBSERVATION PER HR: HCPCS

## 2023-12-03 PROCEDURE — 82550 ASSAY OF CK (CPK): CPT | Performed by: INTERNAL MEDICINE

## 2023-12-03 PROCEDURE — 99232 SBSQ HOSP IP/OBS MODERATE 35: CPT | Performed by: PSYCHIATRY & NEUROLOGY

## 2023-12-03 PROCEDURE — 80048 BASIC METABOLIC PNL TOTAL CA: CPT | Performed by: INTERNAL MEDICINE

## 2023-12-03 PROCEDURE — 25010000002 LEVETRIRACETAM PER 10 MG: Performed by: INTERNAL MEDICINE

## 2023-12-03 RX ORDER — LEVETIRACETAM 500 MG/1
500 TABLET ORAL 2 TIMES DAILY
Qty: 60 TABLET | Refills: 0 | Status: SHIPPED | OUTPATIENT
Start: 2023-12-03 | End: 2023-12-05 | Stop reason: SDUPTHER

## 2023-12-03 RX ADMIN — CETIRIZINE HYDROCHLORIDE 10 MG: 10 TABLET ORAL at 10:48

## 2023-12-03 RX ADMIN — LEVETIRACETAM 500 MG: 500 INJECTION, SOLUTION INTRAVENOUS at 10:48

## 2023-12-03 RX ADMIN — PANTOPRAZOLE SODIUM 40 MG: 40 TABLET, DELAYED RELEASE ORAL at 10:54

## 2023-12-03 NOTE — PLAN OF CARE
Goal Outcome Evaluation:  Plan of Care Reviewed With: patient          Problem: Adult Inpatient Plan of Care  Goal: Plan of Care Review  Outcome: Ongoing, Progressing  Flowsheets (Taken 12/3/2023 0419)  Progress: improving  Plan of Care Reviewed With: patient  Goal: Patient-Specific Goal (Individualized)  Outcome: Ongoing, Progressing  Goal: Absence of Hospital-Acquired Illness or Injury  Outcome: Ongoing, Progressing  Intervention: Identify and Manage Fall Risk  Recent Flowsheet Documentation  Taken 12/3/2023 0404 by Diana Vaughan RN  Safety Promotion/Fall Prevention:   safety round/check completed   room organization consistent   nonskid shoes/slippers when out of bed  Taken 12/3/2023 0203 by Diana Vaughan RN  Safety Promotion/Fall Prevention:   safety round/check completed   room organization consistent   nonskid shoes/slippers when out of bed  Taken 12/2/2023 2356 by Diana Vaughan RN  Safety Promotion/Fall Prevention:   nonskid shoes/slippers when out of bed   room organization consistent   safety round/check completed  Taken 12/2/2023 2201 by Diana Vaughan RN  Safety Promotion/Fall Prevention:   safety round/check completed   room organization consistent   nonskid shoes/slippers when out of bed  Taken 12/2/2023 2002 by Diana Vaughan RN  Safety Promotion/Fall Prevention:   nonskid shoes/slippers when out of bed   room organization consistent   safety round/check completed  Intervention: Prevent Skin Injury  Recent Flowsheet Documentation  Taken 12/3/2023 0404 by Diana Vaughan RN  Body Position:   position changed independently   supine  Taken 12/3/2023 0203 by Diana Vaughan RN  Body Position:   position changed independently   supine  Taken 12/2/2023 2356 by Diana Vaughan RN  Body Position:   position changed independently   supine  Taken 12/2/2023 2201 by Diana Vaughan RN  Body Position:   position changed independently   supine  Taken 12/2/2023  2002 by Diana Vaughan RN  Body Position:   position changed independently   supine  Intervention: Prevent and Manage VTE (Venous Thromboembolism) Risk  Recent Flowsheet Documentation  Taken 12/2/2023 2356 by Diana Vaughan RN  Activity Management: ambulated to bathroom  VTE Prevention/Management: sequential compression devices off  Taken 12/2/2023 2002 by Diana Vaughan RN  Activity Management: activity encouraged  VTE Prevention/Management:   bilateral   sequential compression devices on  Intervention: Prevent Infection  Recent Flowsheet Documentation  Taken 12/3/2023 0404 by Diana Vaughan RN  Infection Prevention: rest/sleep promoted  Taken 12/3/2023 0203 by Diana Vaughan RN  Infection Prevention: rest/sleep promoted  Taken 12/2/2023 2356 by Diana Vaughan RN  Infection Prevention: rest/sleep promoted  Taken 12/2/2023 2002 by Diana Vaughan RN  Infection Prevention: rest/sleep promoted  Goal: Optimal Comfort and Wellbeing  Outcome: Ongoing, Progressing  Intervention: Provide Person-Centered Care  Recent Flowsheet Documentation  Taken 12/2/2023 2002 by Diana Vaughan RN  Trust Relationship/Rapport: care explained  Goal: Readiness for Transition of Care  Outcome: Ongoing, Progressing

## 2023-12-03 NOTE — OUTREACH NOTE
Prep Survey      Flowsheet Row Responses   [facility] patient discharged from? [place]   Is LACE score < 7 ? Yes   Eligibility [facility]   Date of Admission 12/02/23   Date of Discharge 12/03/23   Discharge Disposition Home or Self Care   Discharge diagnosis Seizure   Does the patient have one of the following disease processes/diagnoses(primary or secondary)? Other   Does the patient have Home health ordered? No   Is there a DME ordered? No   Prep survey completed? Yes            Melinda MAKI - Registered Nurse

## 2023-12-03 NOTE — PROGRESS NOTES
Neurology Progress Note        Subjective     Subjective:    No complaints, no further episodes of seizure or seizure-like activity.  Routine EEG showed normal stage I and II sleep architecture, normal background activity, no asymmetries, no interictal activity, photic stimulation was performed with no additional abnormalities noted.  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    Medications:  Current Facility-Administered Medications   Medication Dose Route Frequency Provider Last Rate Last Admin    acetaminophen (TYLENOL) tablet 650 mg  650 mg Oral Q4H PRN Diana De La Torre APRN   650 mg at 12/02/23 0958    Or    acetaminophen (TYLENOL) 160 MG/5ML oral solution 650 mg  650 mg Oral Q4H PRN Diana De La Torre APRN        Or    acetaminophen (TYLENOL) suppository 650 mg  650 mg Rectal Q4H PRN Diana De La Torre APRN        albuterol (PROVENTIL) nebulizer solution 0.083% 2.5 mg/3mL  2.5 mg Nebulization Q6H PRN Gavin Harley MD        calcium carbonate (TUMS) chewable tablet 500 mg (200 mg elemental)  2 tablet Oral BID PRN Diana De La Torre APRN        cetirizine (zyrTEC) tablet 10 mg  10 mg Oral Daily Gavin Harley MD   10 mg at 12/03/23 1048    HYDROcodone-acetaminophen (NORCO) 5-325 MG per tablet 1 tablet  1 tablet Oral Q4H PRN Gavin Harley MD        levETIRAcetam (KEPPRA) injection 500 mg  500 mg Intravenous Q12H Gavin Harley MD   500 mg at 12/03/23 1048    LORazepam (ATIVAN) injection 1 mg  1 mg Intravenous Q4H PRN Diana De La Torre APRN        montelukast (SINGULAIR) tablet 10 mg  10 mg Oral Nightly Gavin Harley MD        morphine injection 2 mg  2 mg Intravenous Q4H PRN Gavin Harley MD        nitroglycerin (NITROSTAT) SL tablet 0.4 mg  0.4 mg Sublingual Q5 Min PRN Wil  DARRELL Chaudhry        ondansetron (ZOFRAN) tablet 4 mg  4 mg Oral Q6H PRN Diana De La Torre APRN        Or    ondansetron (ZOFRAN) injection 4 mg  4 mg Intravenous Q6H PRN Diana De La Torre APRN        pantoprazole (PROTONIX) EC tablet 40 mg  40 mg Oral Q AM Gavin Harley MD   40 mg at 12/03/23 1054    sodium chloride 0.9 % flush 10 mL  10 mL Intravenous Q12H Diana De La Torre APRN   10 mL at 12/02/23 2006    sodium chloride 0.9 % flush 10 mL  10 mL Intravenous PRN Diana De La Torre APRN        sodium chloride 0.9 % infusion 40 mL  40 mL Intravenous PRN Diana De La Torre APRN           Review of Systems:   -A 14 point review of systems is completed and is negative except       Objective      Vital Signs  Temp:  [98.1 °F (36.7 °C)-99.5 °F (37.5 °C)] 98.2 °F (36.8 °C)  Heart Rate:  [86-96] 96  Resp:  [18] 18  BP: (105-130)/(67-77) 127/77    Physical Exam:    Mental Status: Awake, alert, oriented X 3, no aphasia, briskly interactive  Cranial nerves: II-XII intact  Motor: normal strength and tone throughout  Sensory: intact to LT  Cerebellar: No ataxia  Gait: deferred for patient safety      Results Review:    I reviewed the patient's new clinical results.    Results from last 7 days   Lab Units 12/03/23  0612 12/02/23  0731 12/02/23  0135   WBC 10*3/mm3 8.79 11.06* 10.93*   HEMOGLOBIN g/dL 13.6 13.7 14.9   HEMATOCRIT % 43.5 41.5 45.2   PLATELETS 10*3/mm3 210 215 234        Results from last 7 days   Lab Units 12/03/23  0612 12/02/23  0407 12/02/23  0135   SODIUM mmol/L 139 141 139   POTASSIUM mmol/L 3.6 3.6 3.8   CHLORIDE mmol/L 107 107 106   CO2 mmol/L 23.0 24.7 23.6   BUN mg/dL 8 8 9   CREATININE mg/dL 0.88 0.86 0.89   CALCIUM mg/dL 8.9 8.7 9.4   BILIRUBIN mg/dL  --   --  0.3   ALK PHOS U/L  --   --  82   ALT (SGPT) U/L  --   --  38   AST (SGOT) U/L  --   --  29   GLUCOSE mg/dL 91 106* 123*        Lab Results   Component Value Date    MG 2.3 12/02/2023    PROTIME 14.5 (H) 12/02/2023     "INR 1.12 (H) 12/02/2023     No components found for: \"POCGLUC\"  No components found for: \"A1C\"  Lab Results   Component Value Date    HDL 36 (L) 10/02/2023    LDL 85 10/02/2023     No components found for: \"B12\"  Lab Results   Component Value Date    TSH 1.960 12/02/2023       TSH, folate, B12 all within normal limits    Assessment/Plan     Hospital Problem List      Seizure    Asthma    Esophagitis determined by biopsy    Impression:  24-year-old man  with history of asthma, alpha thalassemia trait, erosive gastritis, ADHD.  Presents after an unprovoked generalized tonic-clonic seizure.  History elements concerning for possible RIGOBERTO.  MRI brain with and without contrast was unremarkable and no potential seizure focus was identified.  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  A routine EEG including recording of stage I and stage II sleep was normal.  We discussed the overall very low likelihood of an underlying inflammatory or infectious process involving the CNS and the patient declined proceeding with a lumbar puncture at this time.    Plan:  Continue Keppra 500 mg p.o. twice daily  Repeat routine EEG in 2 to 4 weeks  MRA brain can be obtained as an outpatient with neurosurgery follow-up for long-term recommendations  Follow-up in neuro clinic in 4 to 6 weeks  Patient and family members were instructed to bring him back to the ER if he were to have another seizure    It was discussed with the patient and family members that he should refrain from driving for at least the next 3 months, and was cautioned about refraining from activities that could lead to harm were he to have a spell, such as but not limited to climbing ladders, swimming, submersion in a bath or pool of water, etc.    Will sign off, please call with questions       Shea Perry MD  12/03/23  13:38 " EST

## 2023-12-03 NOTE — DISCHARGE SUMMARY
Date of Admission: 12/2/2023  Date of Discharge:  12/3/2023  Primary Care Physician: Pamela Ojeda MD     Discharge Diagnosis:  Active Hospital Problems    Diagnosis  POA    **Seizure [R56.9]  Yes    Esophagitis determined by biopsy [K20.90]  Yes    Asthma [J45.909]  Yes      Resolved Hospital Problems   No resolved problems to display.       Presenting Problem/History of Present Illness from H&P:  Seizure [R56.9]  New onset seizure [R56.9]     Mr. Marsh is a 23 y.o. with a history of asthma and GERD who presents to [facility] after having a witnessed seizure by his family.  The patient was in his room playing video game when family heard a thump.  They went to his room and found him with his head near the TV stand/glass stand and having generalized convulsions.  He did bite his tongue and had convulsions for a few minutes.  After he is slowly awoke and EMS arrived he had drowsiness and confusion.  He has had slow improvement of his mentation overnight and is now aware of self and surroundings.  The patient does not have any memory of the event or directly before.  He remembered being in his room playing video game and then next thing was he was on the floor surrounded by EMS.  He has left-sided facial pain where there is swelling and a forehead abrasion.  He is not reporting any neck pain.  He has low back pain.  He is not reporting any chest pain palpitations shortness of breath.  No nausea vomiting diarrhea or abdominal pain.  Is not reporting any dysuria or flank pain.  The game he was playing was fortnight and he does not report any prior seizure.  Mother reported that he did have a febrile seizure as a child to neurology.     Hospital Course:  The patient is a 23 y.o. male who presented with witnessed seizure activity at home.  He was admitted and neurology consulted.  He did not have any additional seizure here and has undergone MRI and EEG.  Neurology is going to follow-up today to  determine if he needs to continue AED at discharge and if okay with them he can discharge home.  He does need to follow seizure precautions such as no driving or tub baths for 3 months until seizure-free and cleared by physician.    Exam Today:  General AA NAD  HEENT swelling/abrasion are improving on the left side of his face/forehead  CV RRR  Lungs CTAB  Abdomen ND NT  Extremity no cyanosis or edema  Neuro CN II to XII grossly intact, alert and appropriate  Psych normal mood and affect    Results:  EEG  Clinical interpretation:  This routine awake and sleep EEG is normal.  No potentially epileptogenic activity, seizure activity, or focal slowing is present.  Careful clinical correlation is advised.     CT Head  Normal head brain CT.     CT Face  No acute findings in the face.     CT Cervical Spine  Normal cervical spine CT.     CT Lumbar Spine  Normal lumbar spine CT.     MRI Brain  The noncontrast MRI of the brain is unremarkable. However, an ideal  evaluation for a seizure focus should include an MRI of the brain with  and without the use of IV contrast. This could be obtained as clinically  indicated.    MRI Brain w/wo contrast  Unremarkable MRI of the brain. In specific, no potential seizure focus  is identified on this examination.    Procedures Performed:         Consults:   Consults       Date and Time Order Name Status Description    12/2/2023  3:54 AM Inpatient Neurology Consult General Completed     12/2/2023  3:29 AM [facility] (on-call MD unless specified) Details               Discharge Disposition:  Home or Self Care    Discharge Medications:     Discharge Medications        New Medications        Instructions Start Date   levETIRAcetam 500 MG tablet  Commonly known as: Keppra   500 mg, Oral, 2 Times Daily             Continue These Medications        Instructions Start Date   cetirizine 10 MG tablet  Commonly known as: zyrTEC   10 mg, Oral, Daily      lansoprazole 30 MG capsule  Commonly known as:  Prevacid   30 mg, Oral, Daily      levalbuterol 0.63 MG/3ML nebulizer solution  Commonly known as: XOPENEX   0.63 mg, Nebulization, Every 4 Hours PRN      levalbuterol 45 MCG/ACT inhaler  Commonly known as: XOPENEX HFA   1-2 puffs, Inhalation, Every 4 Hours PRN      montelukast 10 MG tablet  Commonly known as: SINGULAIR   10 mg, Oral, Every Night at Bedtime               Discharge Diet:   Diet Instructions       Advance Diet As Tolerated -Target Diet: home diet      Target Diet: home diet            Activity at Discharge:   Activity Instructions       Activity as Tolerated      Driving Restrictions      Type of Restriction:  Driving  Bathing       Driving Restrictions: No Driving (Time Limited)    Length: Other    Indicate Length of Restriction: until 3 months seizure free and cleared by physician    Bathing Restrictions: No Tub Bath            Follow-up Appointments:   Follow-up Information       Pamela Ojeda MD .    Specialty: Internal Medicine  Contact information:  [address] [facility] [address]  [facility] [address] [phone]               Shea Perry MD Follow up.    Specialty: Neurology  Contact information:  [address] [id number] [phone]                             Test Results Pending at Discharge:       Gavin Harley MD  12/03/23  10:36 EST    Time Spent on Discharge Activities: >30 minutes    Dictated portions using Dragon dictation software.

## 2023-12-04 ENCOUNTER — TELEPHONE (OUTPATIENT)
Dept: NEUROLOGY | Facility: CLINIC | Age: 23
End: 2023-12-04

## 2023-12-04 ENCOUNTER — TRANSITIONAL CARE MANAGEMENT TELEPHONE ENCOUNTER (OUTPATIENT)
Dept: CALL CENTER | Facility: HOSPITAL | Age: 23
End: 2023-12-04
Payer: COMMERCIAL

## 2023-12-04 ENCOUNTER — TELEPHONE (OUTPATIENT)
Dept: FAMILY MEDICINE CLINIC | Facility: CLINIC | Age: 23
End: 2023-12-04

## 2023-12-04 NOTE — TELEPHONE ENCOUNTER
Caller: MONIQUE UREÑA    Relationship to patient: Mother    Best call back number: [phone]    New or established patient?  [x] New  [] Established    Date of discharge: 12/3/23    Facility discharged from: [facility]    Diagnosis/Symptoms: SEIZURES    Length of stay (If applicable): 40 HOURS    Specialty Only: Did you see a [facility] health provider?    [x] Yes  [] No  If so, who? DR. HARRY

## 2023-12-04 NOTE — OUTREACH NOTE
"Call Center TCM Note      Flowsheet Row Responses   [facility] patient discharged from? [place]   Does the patient have one of the following disease processes/diagnoses(primary or secondary)? Other   TCM attempt successful? Yes   Call start time 1049   Call end time 1051   Discharge diagnosis Seizure   Meds reviewed with patient/caregiver? Yes   Is the patient having any side effects they believe may be caused by any medication additions or changes? No   Does the patient have all medications ordered at discharge? Yes   Is the patient taking all medications as directed (includes completed medication regime)? Yes   Comments Appt 12/15 @ 12:45 with Dr.r Ojeda   Does the patient have an appointment with their PCP within 7-14 days of discharge? Yes   Psychosocial issues? No   Did the patient receive a copy of their discharge instructions? Yes   Nursing interventions Reviewed instructions with patient   What is the patient's perception of their health status since discharge? Improving   Is the patient/caregiver able to teach back signs and symptoms related to disease process for when to call PCP? Yes   Is the patient/caregiver able to teach back signs and symptoms related to disease process for when to call 911? Yes   Is the patient/caregiver able to teach back the hierarchy of who to call/visit for symptoms/problems? PCP, Specialist, Home health nurse, Urgent Care, ED, 911 Yes   Additional teach back comments States he is doing \"fine\" and has follow up with PCP tomorrow. He is aware of no driving for 3 months being seizure free and following up with neuro   TCM call completed? Yes   Wrap up additional comments Denies questions or needs at this time.   Call end time 1051            Jacklyn Miller LPN    12/4/2023, 10:52 EST        "

## 2023-12-04 NOTE — TELEPHONE ENCOUNTER
Caller: MONIQUE UREÑA    Relationship: Mother    Best call back number: [phone]    What orders are you requesting (i.e. lab or imaging): MRA     In what timeframe would the patient need to come in: AS SOON AS POSSIBLE     Where will you receive your lab/imaging services: [facility]       Additional notes: SEEN IN [facility] FOR A SEIZURE AND HIT HEAD, THEY SAID THEY WERE GOING TO ORDER AN MRA, AND WHEN SHE CALLED TO SCHEDULE TODAY, THERE ARE NO ORDERS BY HOSPITALIST.

## 2023-12-04 NOTE — PROGRESS NOTES
Case Management Discharge Note      Final Note: Home         Selected Continued Care - Discharged on 12/3/2023 Admission date: 12/2/2023 - Discharge disposition: Home or Self Care      Destination    No services have been selected for the patient.                Durable Medical Equipment    No services have been selected for the patient.                Dialysis/Infusion    No services have been selected for the patient.                Home Medical Care    No services have been selected for the patient.                Therapy    No services have been selected for the patient.                Community Resources    No services have been selected for the patient.                Community & DME    No services have been selected for the patient.                    Transportation Services  Private: Car    Final Discharge Disposition Code: 01 - home or self-care

## 2023-12-05 ENCOUNTER — OFFICE VISIT (OUTPATIENT)
Dept: FAMILY MEDICINE CLINIC | Facility: CLINIC | Age: 23
End: 2023-12-05
Payer: COMMERCIAL

## 2023-12-05 VITALS
WEIGHT: 223 LBS | BODY MASS INDEX: 33.03 KG/M2 | SYSTOLIC BLOOD PRESSURE: 126 MMHG | DIASTOLIC BLOOD PRESSURE: 84 MMHG | OXYGEN SATURATION: 98 % | HEART RATE: 80 BPM | HEIGHT: 69 IN

## 2023-12-05 DIAGNOSIS — Z09 HOSPITAL DISCHARGE FOLLOW-UP: Primary | ICD-10-CM

## 2023-12-05 DIAGNOSIS — R56.9 SEIZURE: ICD-10-CM

## 2023-12-05 DIAGNOSIS — H11.32 SUBCONJUNCTIVAL HEMORRHAGE OF LEFT EYE: ICD-10-CM

## 2023-12-05 DIAGNOSIS — R55 SYNCOPE, UNSPECIFIED SYNCOPE TYPE: ICD-10-CM

## 2023-12-05 RX ORDER — LEVETIRACETAM 500 MG/1
500 TABLET ORAL 2 TIMES DAILY
Qty: 180 TABLET | Refills: 0 | Status: SHIPPED | OUTPATIENT
Start: 2023-12-05

## 2023-12-05 NOTE — TELEPHONE ENCOUNTER
PT MOM IS CALLING TO SEE WHEN IBIS WILL BE SCHED.        PLEASE CALL [phone]     PLEASE ADVISE

## 2023-12-05 NOTE — PROGRESS NOTES
"Transitional Care Follow Up Visit  Subjective     Brandon Marsh is a 23 y.o. male who presents for a transitional care management visit. He is accompanied by his mother.     Within 48 business hours after discharge our office contacted him via telephone to coordinate his care and needs.      I reviewed and discussed the details of that call along with the discharge summary, hospital problems, inpatient lab results, inpatient diagnostic studies, and consultation reports with Brandon.     Current outpatient and discharge medications have been reconciled for the patient.  Reviewed by: Pamela Ojeda MD          12/3/2023     6:23 PM   Date of TCM Phone Call   Hospital [facility]   Date of Admission 12/2/2023   Date of Discharge 12/3/2023   Discharge Disposition Home or Self Care     Risk for Readmission (LACE) Score: 3 (12/3/2023  6:00 AM)      History of Present Illness     From Discharge Summary    \"Seizure [R56.9]  New onset seizure [R56.9]          Mr. Marsh is a 23 y.o. with a history of asthma and GERD who presents to [facility] after having a witnessed seizure by his family.  The patient was in his room playing video game when family heard a thump.  They went to his room and found him with his head near the TV stand/glass stand and having generalized convulsions.  He did bite his tongue and had convulsions for a few minutes.  After he is slowly awoke and EMS arrived he had drowsiness and confusion.  He has had slow improvement of his mentation overnight and is now aware of self and surroundings.  The patient does not have any memory of the event or directly before.  He remembered being in his room playing video game and then next thing was he was on the floor surrounded by EMS.  He has left-sided facial pain where there is swelling and a forehead abrasion.  He is not reporting any neck pain.  He has low back pain.  He is not reporting any chest pain palpitations shortness of " "breath.  No nausea vomiting diarrhea or abdominal pain.  Is not reporting any dysuria or flank pain.  The game he was playing was fortnight and he does not report any prior seizure.  Mother reported that he did have a febrile seizure as a child to neurology.      Hospital Course:    The patient is a 23 y.o. male who presented with witnessed seizure activity at home.  He was admitted and neurology consulted.  He did not have any additional seizure here and has undergone MRI and EEG.  Neurology is going to follow-up today to determine if he needs to continue AED at discharge and if okay with them he can discharge home.  He does need to follow seizure precautions such as no driving or tub baths for 3 months until seizure-free and cleared by physician.\"    Today, HPI:    Mother concerned that two aunts have had convulsive syncope and extremely concerned he has a cardiac issue, especially due to possible LAE on EKG and delta trop of 5 (highest trop was 12). Mom worried he could have passed out from a cardiac issue. Mom states neuro is leaning towards RIGOBERTO but needs further work up. Mom states they were also told pt may need MRA/MELITON follow up. Pt states he's doing OK, no further seizures. No HA, vision changes. L eye with subconjunctival hemorrhage, no vision loss or pain.      The following portions of the patient's history were reviewed and updated as appropriate: allergies, current medications, past family history, past medical history, past social history, past surgical history, and problem list.    Review of Systems    Objective   Vitals:    12/05/23 1248   BP: 126/84   BP Location: Left arm   Patient Position: Sitting   Cuff Size: Large Adult   Pulse: 80   SpO2: 98%   Weight: 101 kg (223 lb)   Height: 175.3 cm (69.02\")     Physical Exam  Constitutional:       General: He is not in acute distress.     Appearance: Normal appearance.   HENT:      Head: Normocephalic and atraumatic.   Eyes:      General: No scleral " icterus.        Right eye: No discharge.         Left eye: No discharge.      Conjunctiva/sclera: Conjunctivae normal.      Pupils: Pupils are equal, round, and reactive to light.      Comments: L subconjunctival hemorrhage   Cardiovascular:      Rate and Rhythm: Normal rate and regular rhythm.      Heart sounds: No murmur heard.     No friction rub. No gallop.   Pulmonary:      Effort: Pulmonary effort is normal. No respiratory distress.      Breath sounds: No wheezing.   Musculoskeletal:         General: No swelling or deformity.   Skin:     Coloration: Skin is not jaundiced.      Findings: No rash.      Comments: Mild periorbital ecchymosis, healing abrasion to forehead   Neurological:      General: No focal deficit present.      Mental Status: He is alert and oriented to person, place, and time.   Psychiatric:         Mood and Affect: Mood normal.         Behavior: Behavior normal.         Judgment: Judgment normal.     DATA REVIEWED:    The following data was reviewed by: Pamela Ojeda MD on 12/05/2023:  Discharge Summary by Gavin Harley MD (12/03/2023 10:36)   Folate (12/02/2023 14:20)  CK (12/03/2023 06:12)  Vitamin B12 (12/02/2023 14:20)  CBC (No Diff) (12/03/2023 06:12)  Basic Metabolic Panel (12/03/2023 06:12)  ECG 12 Lead Stroke Evaluation (12/02/2023 01:37)     Assessment & Plan   Diagnoses and all orders for this visit:    1. Hospital discharge follow-up (Primary)    2. Seizure  -     levETIRAcetam (Keppra) 500 MG tablet; Take 1 tablet by mouth 2 (Two) Times a Day.  Dispense: 180 tablet; Refill: 0  -     Adult Transthoracic Echo Complete W/ Cont if Necessary Per Protocol; Future    3. Syncope, unspecified syncope type  -     Adult Transthoracic Echo Complete W/ Cont if Necessary Per Protocol; Future    4. Subconjunctival hemorrhage of left eye    Patient doing well today.  No further seizures.  No headaches, vision changes, etc. Appears to have a subconjunctival hemorrhage, pt to notify if any  "vision changes or eye pain.  He is tolerating the Keppra. I went ahead and refilled it so he wouldn't run out before neurology appt. They are waiting to hear back from neurology about a follow up appointment.  Discussed seizure precautions, patient not driving, knows to avoid baths and swimming etc. 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     Pt's mother is very worried about the possibility of a cardiac etiology as his EKG showed possible left atrial enlargement, troponin of 12 (normal but delta of 5), and family history of convulsive syncope.  I told her that this clinical picture is very likely due to a seizure and cardiac etiology unlikely, but she is very concerned.  Discussed consideration of an echocardiogram to evaluate for structural etiologies of syncope. They would like to proceed with this.  Echocardiogram ordered.    Recommended patient take his Singulair for his asthma. Declines a Prevnar vaccine.       Return in about 6 months (around 6/5/2024).  "

## 2023-12-05 NOTE — TELEPHONE ENCOUNTER
Caller: YOLA UREÑA    Relationship: Father    Best call back number: [phone]- PT'S PHONE #    What was the call regarding: PT'S FATHER CALLING TO CHECK FOR UPDATE REGARDING REQUEST FOR HOSPITAL F/U APPT. I ADVISED THAT SOMEONE WOULD BE REACHING OUT TO PT DIRECTLY ONCE HIS HOSPITAL NOTES HAVE BEEN REVIEWED FOR SCHEDULING.    Do you require a callback: YES    PLEASE REVIEW AND ADVISE.

## 2023-12-11 ENCOUNTER — HOSPITAL ENCOUNTER (OUTPATIENT)
Dept: CARDIOLOGY | Facility: HOSPITAL | Age: 23
Discharge: HOME | End: 2023-12-11
Admitting: INTERNAL MEDICINE
Payer: COMMERCIAL

## 2023-12-11 VITALS
BODY MASS INDEX: 33.03 KG/M2 | WEIGHT: 223 LBS | SYSTOLIC BLOOD PRESSURE: 147 MMHG | HEART RATE: 76 BPM | DIASTOLIC BLOOD PRESSURE: 97 MMHG | HEIGHT: 69 IN

## 2023-12-11 DIAGNOSIS — R56.9 SEIZURE: ICD-10-CM

## 2023-12-11 DIAGNOSIS — R55 SYNCOPE, UNSPECIFIED SYNCOPE TYPE: ICD-10-CM

## 2023-12-11 LAB
AORTIC ARCH: 2.2 CM
AORTIC DIMENSIONLESS INDEX: 0.8 (DI)
ASCENDING AORTA: 2.1 CM
BH CV ECHO MEAS - ACS: 2.2 CM
BH CV ECHO MEAS - AO MAX PG: 5.3 MMHG
BH CV ECHO MEAS - AO MEAN PG: 3 MMHG
BH CV ECHO MEAS - AO ROOT DIAM: 3.2 CM
BH CV ECHO MEAS - AO V2 MAX: 115 CM/SEC
BH CV ECHO MEAS - AO V2 VTI: 22.1 CM
BH CV ECHO MEAS - AVA(I,D): 2.7 CM2
BH CV ECHO MEAS - EDV(CUBED): 53.7 ML
BH CV ECHO MEAS - EDV(MOD-SP2): 107 ML
BH CV ECHO MEAS - EDV(MOD-SP4): 100 ML
BH CV ECHO MEAS - EF(MOD-BP): 65.5 %
BH CV ECHO MEAS - EF(MOD-SP2): 63.6 %
BH CV ECHO MEAS - EF(MOD-SP4): 70 %
BH CV ECHO MEAS - ESV(CUBED): 15.9 ML
BH CV ECHO MEAS - ESV(MOD-SP2): 39 ML
BH CV ECHO MEAS - ESV(MOD-SP4): 30 ML
BH CV ECHO MEAS - FS: 33.4 %
BH CV ECHO MEAS - IVS/LVPW: 1.05 CM
BH CV ECHO MEAS - IVSD: 1.2 CM
BH CV ECHO MEAS - LAT PEAK E' VEL: 14.8 CM/SEC
BH CV ECHO MEAS - LV MASS(C)D: 146 GRAMS
BH CV ECHO MEAS - LV MAX PG: 3.8 MMHG
BH CV ECHO MEAS - LV MEAN PG: 2 MMHG
BH CV ECHO MEAS - LV V1 MAX: 98.1 CM/SEC
BH CV ECHO MEAS - LV V1 VTI: 17.9 CM
BH CV ECHO MEAS - LVIDD: 3.8 CM
BH CV ECHO MEAS - LVIDS: 2.5 CM
BH CV ECHO MEAS - LVOT AREA: 3.4 CM2
BH CV ECHO MEAS - LVOT DIAM: 2.07 CM
BH CV ECHO MEAS - LVPWD: 1.14 CM
BH CV ECHO MEAS - MED PEAK E' VEL: 9.9 CM/SEC
BH CV ECHO MEAS - MV A DUR: 0.11 SEC
BH CV ECHO MEAS - MV A MAX VEL: 52.8 CM/SEC
BH CV ECHO MEAS - MV DEC SLOPE: 478.7 CM/SEC2
BH CV ECHO MEAS - MV DEC TIME: 0.19 SEC
BH CV ECHO MEAS - MV E MAX VEL: 105.7 CM/SEC
BH CV ECHO MEAS - MV E/A: 2
BH CV ECHO MEAS - MV MAX PG: 3.7 MMHG
BH CV ECHO MEAS - MV MEAN PG: 1.08 MMHG
BH CV ECHO MEAS - MV P1/2T: 61.5 MSEC
BH CV ECHO MEAS - MV V2 VTI: 23.4 CM
BH CV ECHO MEAS - MVA(P1/2T): 3.6 CM2
BH CV ECHO MEAS - MVA(VTI): 2.6 CM2
BH CV ECHO MEAS - PA ACC TIME: 0.15 SEC
BH CV ECHO MEAS - PA V2 MAX: 103.7 CM/SEC
BH CV ECHO MEAS - RAP SYSTOLE: 3 MMHG
BH CV ECHO MEAS - RV MAX PG: 2.29 MMHG
BH CV ECHO MEAS - RV V1 MAX: 75.6 CM/SEC
BH CV ECHO MEAS - RV V1 VTI: 15.9 CM
BH CV ECHO MEAS - RVSP: 3 MMHG
BH CV ECHO MEAS - SUP REN AO DIAM: 1.2 CM
BH CV ECHO MEAS - SV(LVOT): 60.2 ML
BH CV ECHO MEAS - SV(MOD-SP2): 68 ML
BH CV ECHO MEAS - SV(MOD-SP4): 70 ML
BH CV ECHO MEAS - TAPSE (>1.6): 1.15 CM
BH CV ECHO MEASUREMENTS AVERAGE E/E' RATIO: 8.56
BH CV ECHO SHUNT ASSESSMENT PERFORMED (HIDDEN SCRIPTING): 1
BH CV XLRA - RV BASE: 3.8 CM
BH CV XLRA - RV LENGTH: 6.1 CM
BH CV XLRA - RV MID: 1.99 CM
BH CV XLRA - TDI S': 12.6 CM/SEC
LEFT ATRIUM VOLUME INDEX: 11 ML/M2
SINUS: 3.2 CM
STJ: 2.44 CM

## 2023-12-11 PROCEDURE — 25510000001 PERFLUTREN (DEFINITY) 8.476 MG IN SODIUM CHLORIDE (PF) 0.9 % 10 ML INJECTION: Performed by: INTERNAL MEDICINE

## 2023-12-11 PROCEDURE — 93306 TTE W/DOPPLER COMPLETE: CPT | Performed by: INTERNAL MEDICINE

## 2023-12-11 PROCEDURE — 93306 TTE W/DOPPLER COMPLETE: CPT

## 2023-12-11 RX ADMIN — SODIUM CHLORIDE 2 ML: 9 INJECTION INTRAMUSCULAR; INTRAVENOUS; SUBCUTANEOUS at 13:17

## 2024-01-02 ENCOUNTER — TELEPHONE (OUTPATIENT)
Dept: FAMILY MEDICINE CLINIC | Facility: CLINIC | Age: 24
End: 2024-01-02

## 2024-01-02 NOTE — TELEPHONE ENCOUNTER
Caller: MONIQUE UREÑA    Relationship: Mother    Best call back number: [phone]     Which medication are you concerned about:  PATIENT MOTHER MONIQUE CALLED STATED THE PATIENT WANTS TO KNOW IF HE CAN TAKE DECONGESTANT MEDICATION WITH HIS PRESCRIPTION MEDICATION KEPPRA.      Who prescribed you this medication:      When did you start taking this medication:      What are your concerns:      How long have you had these concerns:         HUB:  CALLED DISCONNECTED BEFORE HAD THE CHANCE TO REPEAT BACK THE MESSAGE AND SENDING MESSAGE.

## 2024-01-02 NOTE — TELEPHONE ENCOUNTER
PATIENTS MOTHER WANTED TO KNOW WHAT DECONGEST MEDICATION CAN PATIENT POSSIBLY BEING EPILEPTIC? PATIENT IS FEELING REALLY BAD, BUT MOTHER DOESN'T WANT TO GIVE HIM SOMETHING THAT MAY TRIGGER A SEIZURE.     PLEASE CALL PATIENT'S MOTHER TO ADVISE WHAT CAN BE DONE.     [facility]/pharmacy #[id number] - [place], [place] - [address] - [phone] [facility] [phone]  [phone]

## 2024-01-03 ENCOUNTER — APPOINTMENT (OUTPATIENT)
Dept: GENERAL RADIOLOGY | Facility: HOSPITAL | Age: 24
End: 2024-01-03
Payer: COMMERCIAL

## 2024-01-03 ENCOUNTER — HOSPITAL ENCOUNTER (EMERGENCY)
Facility: HOSPITAL | Age: 24
Discharge: HOME | End: 2024-01-03
Attending: EMERGENCY MEDICINE | Admitting: EMERGENCY MEDICINE
Payer: COMMERCIAL

## 2024-01-03 VITALS
HEART RATE: 128 BPM | WEIGHT: 220 LBS | TEMPERATURE: 99.9 F | DIASTOLIC BLOOD PRESSURE: 81 MMHG | BODY MASS INDEX: 32.58 KG/M2 | HEIGHT: 69 IN | SYSTOLIC BLOOD PRESSURE: 142 MMHG | OXYGEN SATURATION: 96 % | RESPIRATION RATE: 18 BRPM

## 2024-01-03 DIAGNOSIS — J40 BRONCHITIS: Primary | ICD-10-CM

## 2024-01-03 DIAGNOSIS — J45.901 EXACERBATION OF ASTHMA, UNSPECIFIED ASTHMA SEVERITY, UNSPECIFIED WHETHER PERSISTENT: ICD-10-CM

## 2024-01-03 LAB
FLUAV SUBTYP SPEC NAA+PROBE: NOT DETECTED
FLUBV RNA ISLT QL NAA+PROBE: NOT DETECTED
RSV RNA NPH QL NAA+NON-PROBE: NOT DETECTED
SARS-COV-2 RNA RESP QL NAA+PROBE: NOT DETECTED

## 2024-01-03 PROCEDURE — 87636 SARSCOV2 & INF A&B AMP PRB: CPT | Performed by: EMERGENCY MEDICINE

## 2024-01-03 PROCEDURE — 63710000001 PREDNISONE PER 5 MG: Performed by: EMERGENCY MEDICINE

## 2024-01-03 PROCEDURE — 99284 EMERGENCY DEPT VISIT MOD MDM: CPT | Performed by: EMERGENCY MEDICINE

## 2024-01-03 PROCEDURE — 99283 EMERGENCY DEPT VISIT LOW MDM: CPT

## 2024-01-03 PROCEDURE — 71046 X-RAY EXAM CHEST 2 VIEWS: CPT

## 2024-01-03 PROCEDURE — 87634 RSV DNA/RNA AMP PROBE: CPT | Performed by: EMERGENCY MEDICINE

## 2024-01-03 RX ORDER — IPRATROPIUM BROMIDE AND ALBUTEROL SULFATE 2.5; .5 MG/3ML; MG/3ML
3 SOLUTION RESPIRATORY (INHALATION) ONCE
Status: COMPLETED | OUTPATIENT
Start: 2024-01-03 | End: 2024-01-03

## 2024-01-03 RX ORDER — BENZONATATE 100 MG/1
100 CAPSULE ORAL 3 TIMES DAILY PRN
Qty: 21 CAPSULE | Refills: 0 | Status: SHIPPED | OUTPATIENT
Start: 2024-01-03 | End: 2024-01-05

## 2024-01-03 RX ORDER — PREDNISONE 20 MG/1
40 TABLET ORAL DAILY
Qty: 14 TABLET | Refills: 0 | Status: SHIPPED | OUTPATIENT
Start: 2024-01-03 | End: 2024-01-10

## 2024-01-03 RX ORDER — LEVALBUTEROL TARTRATE 45 UG/1
2 AEROSOL, METERED ORAL EVERY 4 HOURS PRN
Status: SHIPPED | OUTPATIENT
Start: 2024-01-03

## 2024-01-03 RX ORDER — AZITHROMYCIN 250 MG/1
TABLET, FILM COATED ORAL
Qty: 6 TABLET | Refills: 0 | Status: SHIPPED | OUTPATIENT
Start: 2024-01-03

## 2024-01-03 RX ORDER — IPRATROPIUM BROMIDE AND ALBUTEROL SULFATE 2.5; .5 MG/3ML; MG/3ML
3 SOLUTION RESPIRATORY (INHALATION) ONCE
Status: DISCONTINUED | OUTPATIENT
Start: 2024-01-03 | End: 2024-01-03 | Stop reason: SDUPTHER

## 2024-01-03 RX ADMIN — IPRATROPIUM BROMIDE AND ALBUTEROL SULFATE 3 ML: 2.5; .5 SOLUTION RESPIRATORY (INHALATION) at 10:37

## 2024-01-03 RX ADMIN — IPRATROPIUM BROMIDE AND ALBUTEROL SULFATE 3 ML: 2.5; .5 SOLUTION RESPIRATORY (INHALATION) at 11:49

## 2024-01-03 RX ADMIN — PREDNISONE 60 MG: 10 TABLET ORAL at 10:36

## 2024-01-03 NOTE — FSED PROVIDER NOTE
[facility]      AFTER YOU GO HOME FROM YOUR HEART SURGERY    You had a full sternotomy, so avoid lifting anything greater than ten pounds for 6 weeks after surgery and then less than 20 pounds for an additional 6 weeks.  No driving for 4 weeks after surgery or while on pain medication.     Avoid strenuous activities such as bowling, vacuuming, raking, shoveling, golf or tennis for 12 weeks after your surgery. It is okay to resume sex if you feel comfortable in doing so. You may have to try different positions with your partner.     Splint your chest incision by hugging a pillow or bringing your arms across your chest when coughing or sneezing. Avoid pushing off with your arms when getting up for the first month if you have had your sternum opened.    Shower or wash your incisions daily with soap and water (or as instructed), pat dry. Keep wound clean and dry, showers are okay after discharge, but don't let spray hit directly on incision. No baths or swimming for 1 month.  Clean wounds twice a day for 2 weeks with microklenz spray if available. Cover chest tube sites with gauze until they stop draining, then leave open. It is not abnormal for chest tube sites to drain yellowish/clear fluid for up to 2-3 weeks after surgery.   Watch for signs of infection: increased redness, tenderness, warmth or any drainage that appears infected (pus like) or is persistent.  Also a temperature > 100.5 F or chills. Call your surgeon or primary care provider's office immediately. Remove any skin glue left on incisions after 10-14 days. This will not affect your incision and can speed up healing.    Exercise is very important in your recovery. Please follow the guidelines set up for you in your cardiac rehab classes at the hospital. If outpatient cardiac rehab was ordered for you, we highly recommend you participate. If you have problems arranging your cardiac rehab, please call [phone].     Avoid  Subjective   History of Present Illness  24yo male pmh significant asthma/seizure presents ED c/o 2d hx nonproductive cough/congestion/fever/wheezing/rhinorrhea.  [facility]  Pt reportedly continued to cough throughout the night prompting re-evaluation.    History provided by:  Patient  URI  Presenting symptoms: congestion, cough and rhinorrhea    Associated symptoms: wheezing        Review of Systems   Constitutional: Negative.    HENT:  Positive for congestion and rhinorrhea.    Eyes: Negative.    Respiratory:  Positive for cough and wheezing.    Cardiovascular: Negative.    Gastrointestinal: Negative.    Genitourinary: Negative.    Allergic/Immunologic: Negative for immunocompromised state.   All other systems reviewed and are negative.      Past Medical History:   Diagnosis Date    ADHD (attention deficit hyperactivity disorder)     Not currently on medication    Allergic     Asthma     Clotting disorder     Alpha Thalassemia Trait    COVID-19 virus detected 12/22/2022    Gastric hemorrhage due to erosive gastritis 08/31/2022    GERD (gastroesophageal reflux disease)     Seasonal allergies     Seizures 12/02/2023    Tachycardia 03/01/2023       Allergies   Allergen Reactions    Nuts Shortness Of Breath     tree       Past Surgical History:   Procedure Laterality Date    COLONOSCOPY      ENDOSCOPY N/A     ENDOSCOPY N/A 11/17/2022    Procedure: ESOPHAGOGASTRODUODENOSCOPY with bx;  Surgeon: Ronak Rodriguez MD;  Location: [facility] ENDOSCOPY;  Service: Gastroenterology;  Laterality: N/A;  pre: GERD  post: gastritis, esophagitis     UPPER GASTROINTESTINAL ENDOSCOPY         Family History   Problem Relation Age of Onset    Arthritis Mother     Cervical cancer Mother     Cancer Mother         Cervical Carcinoma    Hyperlipidemia Father     Arthritis Father     Depression Father     Anxiety disorder Father     Heart disease Father         Afib    Hypertension  Father     Mental illness Father         Bipolar 1    Anxiety disorder Sister     Depression Sister     Breast cancer Maternal Aunt     Asthma Maternal Aunt     Lung cancer Paternal Grandmother     Cancer Paternal Grandmother         Small Cell Lung Cancer, neuroendocrine tumor in stomach, Basal Cell Carcinoma    COPD Paternal Grandmother     Depression Paternal Grandmother     Heart disease Paternal Grandmother     Vision loss Maternal Grandfather         Glaucoma    Arthritis Maternal Grandmother     Depression Maternal Grandmother     Drug abuse Paternal Aunt        Social History     Socioeconomic History    Marital status: Single   Tobacco Use    Smoking status: Never    Smokeless tobacco: Never   Vaping Use    Vaping Use: Never used   Substance and Sexual Activity    Alcohol use: Never    Drug use: Never    Sexual activity: Never           Objective   Physical Exam  Vitals and nursing note reviewed.   Constitutional:       Appearance: Normal appearance.   HENT:      Head: Normocephalic and atraumatic.      Right Ear: Tympanic membrane, ear canal and external ear normal.      Left Ear: Ear canal and external ear normal.      Nose: Nose normal.      Mouth/Throat:      Mouth: Mucous membranes are moist.      Pharynx: Oropharynx is clear. No oropharyngeal exudate or posterior oropharyngeal erythema.   Eyes:      Pupils: Pupils are equal, round, and reactive to light.   Cardiovascular:      Rate and Rhythm: Normal rate and regular rhythm.      Pulses: Normal pulses.      Heart sounds: Normal heart sounds. No murmur heard.     No friction rub. No gallop.   Pulmonary:      Effort: Pulmonary effort is normal.      Breath sounds: Examination of the right-upper field reveals wheezing. Examination of the left-upper field reveals wheezing. Examination of the right-middle field reveals wheezing. Examination of the left-middle field reveals wheezing. Examination of the right-lower field reveals decreased breath sounds.  sitting for prolonged periods of time, try to walk every hour during the day. If you have a leg incision, elevate your leg often when you are not walking.    Check your weight when you get home from the hospital and continue to check it daily through your recovery for at least a month. If you notice a weight gain of 2-3 pounds in a week, notify your primary care physician, cardiologist or surgeon.    Bowel activity may be slow after surgery. If necessary, you may take an over the counter laxative such as Milk of Magnesia or Miralax. You may have stool softeners prescribed (docusate sodium, Senokot). We recommend using stool softeners while using narcotics for pain (oxycodone/percocet, hydrocodone/vicodin).      DO NOT SMOKE.  IF YOU NEED HELP QUITTING, PLEASE TALK WITH YOUR CARDIOLOGIST OR PRIMARY DOCTOR.    You are on a blood thinner, follow the instructions you were given in the hospital and DO NOT SKIP this medication. Try and take it the same time everyday. Your primary care physician or coumadin clinic will manage the dosing.     REGARDING PRESCRIPTION REFILLS.  If you need a refill on your pain medication contact us.  All other medications will be adjusted, discontinued and re-filled by your primary care physician and/or your cardiologist as they were prior to your surgery. We have given you enough for one to three month with possibly one refill.    POST-OPERATIVE CLINIC VISITS  You will now return to the care of your primary physician and your cardiologist.   - Restart metformin at 500 mg BID and follow up with Diabetic Provider at home  - Follow up with Vascular surgery team in 3-4 weeks in [place] (remove right groin staples on 4/10 and fasciotomy site sutures on 3-4 weeks)  - Follow up opthalmology as outpatient PRN   If there is a need to return to see CT Surgery please call our  at [phone].    SURGICAL QUESTIONS  Please call Sherry Sky with any surgical recovery and medication questions,  Examination of the left-lower field reveals decreased breath sounds. No rhonchi or rales.   Abdominal:      General: Abdomen is flat. Bowel sounds are normal. There is no distension.      Palpations: Abdomen is soft.      Tenderness: There is no abdominal tenderness. There is no guarding or rebound.   Musculoskeletal:         General: No swelling or deformity. Normal range of motion.      Cervical back: Normal range of motion and neck supple. No rigidity.   Lymphadenopathy:      Cervical: No cervical adenopathy.   Skin:     General: Skin is warm.   Neurological:      Mental Status: He is alert.         Procedures           ED Course      Labs Reviewed   COVID-19 AND FLU A/B, NP SWAB IN TRANSPORT MEDIA 1 HR TAT - Normal    Narrative:     Fact sheet for providers: https://www.fda.gov/media/360298/download    Fact sheet for patients: https://www.fda.gov/media/728797/download    Test performed by PCR.   RSV PCR - Normal     XR Chest 2 View    Result Date: 1/3/2024  Narrative: XR CHEST 2 VW-  Clinical: Cough and congestion, fever  COMPARISON: None  FINDINGS: Heart size within normal limits. No effusion, edema or acute airspace disease is demonstrated. Old deformity of the right seventh rib noted.  CONCLUSION: No active disease of the chest  This report was finalized on 1/3/2024 10:28 AM by Dr. Zack Degroot M.D on Workstation: [id number]      Adult Transthoracic Echo Complete W/ Cont if Necessary Per Protocol    Result Date: 12/11/2023  Narrative:   Left ventricular systolic function is normal. Calculated left ventricular EF = 65.5%   Left ventricular diastolic function was normal.   Saline test results are negative.                                         Medical Decision Making  Labs/radiographic studies reviewed.  Covid19/influenza: neg.  CXR: no active disease.  Pt received prednisone 60mg po/duoneb x2 in ED.  Re-evaluation: chest clear to auscultation. Negative wheezes/rales/rhonchi.  No accessory muscle usage. Good  her phone number is listed below.  She can assist you with your needs and contact other surgery care team members as indicated.    On weekends or after hours, please call [phone] and ask the  to   page the Cardiothoracic Surgery fellow on call.      Thank you,    Your Cardiothoracic Surgery Team  Sherry Sky RN Care Coordinator-  [phone]   Julee Vail PA-C   air exchange. Pt ambulated in ED with sao2 95% RA.  PEFR not available at this facility.  Stable discharge.  Plan prednisone 40mg po daily x7d/z-pack as directed/tessalon prn/xopenex hfa prn/pulmicort flexhaler 360mcg bid.  Return precautions provided.    Problems Addressed:  Bronchitis: complicated acute illness or injury  Exacerbation of asthma, unspecified asthma severity, unspecified whether persistent: complicated acute illness or injury    Amount and/or Complexity of Data Reviewed  Labs: ordered.  Radiology: ordered.    Risk  Prescription drug management.        Final diagnoses:   Bronchitis   Exacerbation of asthma, unspecified asthma severity, unspecified whether persistent       ED Disposition  ED Disposition       ED Disposition   Discharge    Condition   Good    Comment   --               Pamela Ojeda MD  [address] [id number] [phone]    In 1 day           Medication List        New Prescriptions      azithromycin 250 MG tablet  Commonly known as: ZITHROMAX  Take 2 tabs by mouth today then take 1 tab by mouth daily x4 days     benzonatate 100 MG capsule  Commonly known as: TESSALON  Take 1 capsule by mouth 3 (Three) Times a Day As Needed for Cough.     budesonide 180 MCG/ACT inhaler  Commonly known as: PULMICORT  Inhale 2 puffs 2 (Two) Times a Day.     predniSONE 20 MG tablet  Commonly known as: DELTASONE  Take 2 tablets by mouth Daily for 7 days.               Where to Get Your Medications        These medications were sent to [facility]/pharmacy #[id number] - [place], [place] - [address] - [phone]  - [phone]   [address], [facility] [address]      Phone: [phone]   azithromycin 250 MG tablet  benzonatate 100 MG capsule  budesonide 180 MCG/ACT inhaler  predniSONE 20 MG tablet

## 2024-01-03 NOTE — ED NOTES
Patient's mother called asking for refill on his Xopenex inhaler. MD Guillen attempted to send it electronically and it would not go through. This RN called medication in to [facility] at [phone] at this time, per VBO of MD Guillen

## 2024-01-03 NOTE — ED NOTES
Patient's mother called back again stating that they actually need the Xopenex nebulized solution. MD Guillen gave a verbal for the nebulized solution. This RN called in Xopenex 1.25mg nebulized solution Q6 PRN wheezing #12 under Dr. Moreno Guillen. No refills. [facility] [phone]

## 2024-01-04 NOTE — PROGRESS NOTES
Mode of Visit: Video  Location of patient: home  Location of provider: [facility]  You have chosen to receive care through a telehealth visit. The patient verbally consented to the video visit.  The visit included audio and video interaction. No technical issues occurred during this visit     Subjective       Chief Complaint   Patient presents with    Cough     Follow up from urgent care.          HPI:        Brandon is a 23 y.o. male who presents to [facility] today for ER follow up.    He was seen twice in the [facility] for respiratory sxs, most recently discharged on Prednisone, Azithromycin, Xopenex, Tessalon perles. Flu, RSV, and COVID negative. Was supposed to get pulmicort but issues getting it from pharmacy. His mom was also on the call today.     Overall feeling better now  No more wheezing  Less coughing  No fevers or chills (did have)  Did have a headache, better now  No further rib pain (?? Fx seen on x-ray from recent fall/seizure)  Sees Dr. Luna for asthma, just saw him in December before this illness  Needs Epipen refill for tree nut allergy         PE:   Objective     There is no height or weight on file to calculate BMI.    Physical Exam   Constitutional: He appears well-developed and well-nourished. No distress.   HENT:   Head: Normocephalic and atraumatic.   Pulmonary/Chest: Effort normal.  No respiratory distress.  Neurological: He is alert.   Psychiatric: He has a normal mood and affect.             The following data was reviewed by: Pamela Ojeda MD on 01/05/2024:    [facility] Provider Note by April Blanco APRN (01/02/2024 12:05)  [facility] Provider Note by Moreno Guillen MD (01/03/2024 10:40)  COVID-19 and FLU A/B PCR, 1 HR TAT - Swab, Nasopharynx (01/03/2024 10:03)  RSV PCR - Swab, Nasopharynx (01/03/2024 10:26)  XR Chest 2 View (01/03/2024 10:18)        A/P:     Assessment & Plan   Diagnoses and all orders for this visit:    1. Exacerbation of intermittent  asthma, unspecified asthma severity (Primary)    2. Tree nut allergy  -     EPINEPHrine (EpiPen 2-Riaz) 0.3 MG/0.3ML solution auto-injector injection; Inject 0.3 mL into the appropriate muscle as directed by prescriber 1 (One) Time As Needed (anaphylaxis). May repeat in 5-15 minutes x 1 if Brandon does not have adequate response to the first dose  Dispense: 1 each; Refill: 2    3. History of anaphylaxis  -     EPINEPHrine (EpiPen 2-Riaz) 0.3 MG/0.3ML solution auto-injector injection; Inject 0.3 mL into the appropriate muscle as directed by prescriber 1 (One) Time As Needed (anaphylaxis). May repeat in 5-15 minutes x 1 if Brandon does not have adequate response to the first dose  Dispense: 1 each; Refill: 2    4. Upper respiratory tract infection, unspecified type    Pt seen in ER follow up. Doing better on current therapy. Has all refills he needs except for epipen for hx tree nut allergy which I refilled. He already has a pulmonologist he can see as needed as well. Recommended switching Mucinex DM to plain Mucinex given seizure hx, although seizure hx not an absolute contraindication to dextromethorphan.         Follow up:   Return if symptoms worsen or fail to improve.

## 2024-01-05 ENCOUNTER — TELEMEDICINE (OUTPATIENT)
Dept: FAMILY MEDICINE CLINIC | Facility: CLINIC | Age: 24
End: 2024-01-05
Payer: COMMERCIAL

## 2024-01-05 DIAGNOSIS — Z91.018 TREE NUT ALLERGY: ICD-10-CM

## 2024-01-05 DIAGNOSIS — J45.21 EXACERBATION OF INTERMITTENT ASTHMA, UNSPECIFIED ASTHMA SEVERITY: Primary | ICD-10-CM

## 2024-01-05 DIAGNOSIS — J06.9 UPPER RESPIRATORY TRACT INFECTION, UNSPECIFIED TYPE: ICD-10-CM

## 2024-01-05 DIAGNOSIS — Z87.892 HISTORY OF ANAPHYLAXIS: ICD-10-CM

## 2024-01-05 PROCEDURE — 99213 OFFICE O/P EST LOW 20 MIN: CPT | Performed by: INTERNAL MEDICINE

## 2024-01-05 RX ORDER — EPINEPHRINE 0.3 MG/.3ML
0.3 INJECTION SUBCUTANEOUS ONCE AS NEEDED
Qty: 1 EACH | Refills: 2 | Status: SHIPPED | OUTPATIENT
Start: 2024-01-05

## 2024-01-22 ENCOUNTER — OFFICE VISIT (OUTPATIENT)
Dept: NEUROLOGY | Facility: CLINIC | Age: 24
End: 2024-01-22
Payer: COMMERCIAL

## 2024-01-22 VITALS
BODY MASS INDEX: 33.33 KG/M2 | OXYGEN SATURATION: 95 % | WEIGHT: 225 LBS | SYSTOLIC BLOOD PRESSURE: 120 MMHG | DIASTOLIC BLOOD PRESSURE: 82 MMHG | HEART RATE: 94 BPM | HEIGHT: 69 IN

## 2024-01-22 DIAGNOSIS — R56.9 SEIZURE: Primary | ICD-10-CM

## 2024-01-22 PROCEDURE — 99204 OFFICE O/P NEW MOD 45 MIN: CPT | Performed by: PSYCHIATRY & NEUROLOGY

## 2024-01-22 RX ORDER — LEVETIRACETAM 750 MG/1
750 TABLET ORAL 2 TIMES DAILY
Qty: 60 TABLET | Refills: 2 | Status: SHIPPED | OUTPATIENT
Start: 2024-01-22

## 2024-01-22 RX ORDER — MULTIPLE VITAMINS W/ MINERALS TAB 9MG-400MCG
1 TAB ORAL DAILY
COMMUNITY

## 2024-01-22 NOTE — PROGRESS NOTES
Chief Complaint  Seizures (12/2/2023)    Subjective          Brandno Marsh presents to [facility] NEUROLOG[facility] for   HISTORY OF PRESENT ILLNESS:    Brandon Marsh is a 23 year old right handed man who presents to neurology clinic with his mother, Dianna, for initial evaluation and treatment of seizures and hospital follow up.  On 12/2/2023 he reportedly experienced seizure like activity close to midnight.  His mother heard a loud bang.  They found him face down seizing.  He was stiff and his legs were under his bed and his head was on the bottom shelf of [employer]. His mother witnessed him seizing with generalized convulsions noted followed by shallow breathing.  He was at home in his room playing video games and the next thing he remembers is waking up on the floor with EMS around him.  He bit the left side of his tongue.  The tonic-clonic activity lasted for approximately 3-5 minutes at most but he was postictal for at least 20 minutes.  No urinary incontinence.  No recent fevers but he had some sinus infection the week prior.  No sleep deprivation.  No changes in medications.  Denies alcohol or drug use.  There is family history of seizures in patient's greater grandmother who was on phenobarbital.  His great aunt has a seizure disorder an is on zonisamide.  He also has a cousin with seizure disorder who is on oxcarbazepine.  He had febrile seizures as a child and his sister also had febrile seizures as child.  He has been on ADHD medication for impulsive behavior.  He was also having eye blinking which patient is not aware of doing and mother tells me most recently he did this at [employer] on Sunday.  He he may have also had rare jerking movements of his extremities or holds a cup and it may drop out of his hand.  He was treated with Keppra 1000 mg IV x 1 and continued on 500 every 12 hours.  CPK 19.  Head CT showed no acute intracranial process. Of note he had a brain MRI scan with and  without contrast done on 12/2/2023 which I reviewed the images independently on his visit today and does not demonstrate any acute intracranial abnormalities.  He has had a normal awake and sleep EEG.  He is doing well with the Keppra 500 mg BID.      Past Medical History:   Diagnosis Date    ADHD (attention deficit hyperactivity disorder)     Not currently on medication    Allergic     Asthma     Clotting disorder     Alpha Thalassemia Trait    COVID-19 virus detected 12/22/2022    Gastric hemorrhage due to erosive gastritis 08/31/2022    GERD (gastroesophageal reflux disease)     Head injury 12/02/2023    Hit head with seizure    Seasonal allergies     Seizures 12/02/2023    Tachycardia 03/01/2023        Family History   Problem Relation Age of Onset    Arthritis Mother     Cervical cancer Mother     Cancer Mother         Cervical Carcinoma    Migraines Mother     Hyperlipidemia Father     Arthritis Father     Depression Father     Anxiety disorder Father     Heart disease Father         Afib    Hypertension Father     Mental illness Father         Bipolar 1    Anxiety disorder Sister     Depression Sister     Breast cancer Maternal Aunt     Asthma Maternal Aunt     Lung cancer Paternal Grandmother     Cancer Paternal Grandmother         Small Cell Lung Cancer, neuroendocrine tumor in stomach, Basal Cell Carcinoma    COPD Paternal Grandmother     Depression Paternal Grandmother     Heart disease Paternal Grandmother     Vision loss Maternal Grandfather         Glaucoma    Arthritis Maternal Grandmother     Depression Maternal Grandmother     Migraines Maternal Grandmother     Drug abuse Paternal Aunt     Seizures Paternal Aunt     Seizures Paternal Aunt         Social History     Socioeconomic History    Marital status: Single   Tobacco Use    Smoking status: Never    Smokeless tobacco: Never   Vaping Use    Vaping Use: Never used   Substance and Sexual Activity    Alcohol use: Never    Drug use: Never    Sexual  "activity: Never        I have reviewed and confirmed the accuracy of the ROS as documented by the MA/LPN/RN Wallace Escobedo MD   Review of Systems   Constitutional:  Negative for activity change and appetite change.   HENT:  Negative for trouble swallowing and voice change.    Eyes:  Negative for blurred vision, double vision and pain.   Neurological:  Positive for seizures (12/2/2023). Negative for dizziness, tremors, syncope, facial asymmetry, speech difficulty, weakness, light-headedness, numbness, headache, memory problem and confusion.   Psychiatric/Behavioral:  Positive for agitation. Negative for behavioral problems, decreased concentration, dysphoric mood, hallucinations, self-injury, sleep disturbance, suicidal ideas, negative for hyperactivity, depressed mood and stress. The patient is not nervous/anxious.         Objective   Vital Signs:   /82   Pulse 94   Ht 175.3 cm (69.02\")   Wt 102 kg (225 lb)   SpO2 95%   BMI 33.21 kg/m²       PHYSICAL EXAM:    General   Mental Status - Alert. General Appearance - Well developed, Well groomed, Oriented and Cooperative. Orientation - Oriented X3.       Head and Neck  Head - normocephalic, atraumatic with no lesions or palpable masses.  Neck    Global Assessment - supple.       Eye   Sclera/Conjunctiva - Bilateral - Normal.    ENMT  Mouth and Throat   Oral Cavity/Oropharynx: Oropharynx - the soft palate,uvula and tongue are normal in appearance.    Chest and Lung Exam   Chest - lung clear to auscultation bilaterally.    Cardiovascular   Cardiovascular examination reveals  - normal heart sounds, regular rate and rhythm.    Neurologic   Mental Status: Speech - Normal. Cognitive function - appropriate fund of knowledge. No impairment of attention, Impairment of concentration, impairment of long term memory or impairment of short term memory.  Cranial Nerves:   II Optic: Visual acuity - Left - Normal. Right - Normal. Visual fields - Normal (to " confrontation).  III Oculomotor: Pupillary constriction - Left - Normal. Right - Normal.  VII Facial: - Normal Bilaterally.   IX Glossopharyngeal / X Vagus - Normal.  XI Accessory: Trapezius - Bilateral - Normal. Sternocleidomastoid - Bilateral - Normal.  XII Hypoglossal - Bilateral - Normal.  Eye Movements: - Normal Bilaterally.  Sensory:   Light Touch: Intact - Globally.  Motor:   Bulk and Contour: - Normal.  Tone: - Normal.  Tremor: Not present.  Strength: 5/5 normal muscle strength - All Muscles.   General Assessment of Reflexes: - deep tendon reflexes are normal. Coordination - No Impairment of finger-to-nose or Impairment of rapid alternating movements. Gait - Normal.       Result Review :                 Assessment and Plan    Problem List Items Addressed This Visit          Neuro    Seizure - Primary    Current Assessment & Plan     23 year old right handed man with seizures and hospital follow up.  On 12/2/2023 he reportedly experienced seizure like activity close to midnight.  His mother heard a loud bang.  They found him face down seizing.  He was stiff and his legs were under his bed and his head was on the bottom shelf of entertainment center. His mother witnessed him seizing with generalized convulsions noted followed by shallow breathing.  He was at home in his room playing video games and the next thing he remembers is waking up on the floor with EMS around him.  He bit the left side of his tongue.  The tonic-clonic activity lasted for approximately 3-5 minutes at most but he was postictal for at least 20 minutes.  No urinary incontinence.  No recent fevers but he had some sinus infection the week prior.  No sleep deprivation.  No changes in medications.  Denies alcohol or drug use.  There is family history of seizures in patient's greater grandmother who was on phenobarbital.  His great aunt has a seizure disorder an is on zonisamide.  He also has a cousin with seizure disorder who is on  oxcarbazepine.  He had febrile seizures as a child and his sister also had febrile seizures as child.  He has been on ADHD medication for impulsive behavior.  He was also having eye blinking which patient is not aware of doing and mother tells me most recently he did this at [employer] on Sunday.  He he may have also had rare jerking movements of his extremities or holds a cup and it may drop out of his hand.  He was treated with Keppra 1000 mg IV x 1 and continued on 500 every 12 hours.  CPK 19.  Head CT showed no acute intracranial process. Of note he had a brain MRI scan with and without contrast done on 12/2/2023 which I reviewed the images independently on his visit today and does not demonstrate any acute intracranial abnormalities.  He has had a normal awake and sleep EEG.  He is doing well with the Keppra 500 mg BID.  He weighs 225 lbs and I recommend increasing his dose given his weight to 750 mg BID and we can check a level in the future visit.  I discussed seizure precautions including not driving for at least 3 months of seizure freedom, take showers as opposed to baths and staying off of elevated places and heights.  Advised him to not play video games.  Will follow up in 3 months and sooner if needed.           Relevant Medications    levETIRAcetam (Keppra) 750 MG tablet       I spent 45 minutes caring for Brandon on this date of service. This time includes time spent by me in the following activities:preparing for the visit, reviewing tests, obtaining and/or reviewing a separately obtained history, performing a medically appropriate examination and/or evaluation , counseling and educating the patient/family/caregiver, ordering medications, tests, or procedures, documenting information in the medical record, independently interpreting results and communicating that information with the patient/family/caregiver, and care coordination    Follow Up   No follow-ups on file.  Patient was given instructions and  counseling regarding his condition or for health maintenance advice. Please see specific information pulled into the AVS if appropriate.

## 2024-01-22 NOTE — ASSESSMENT & PLAN NOTE
23 year old right handed man with seizures and hospital follow up.  On 12/2/2023 he reportedly experienced seizure like activity close to midnight.  His mother heard a loud bang.  They found him face down seizing.  He was stiff and his legs were under his bed and his head was on the bottom shelf of entertainment center. His mother witnessed him seizing with generalized convulsions noted followed by shallow breathing.  He was at home in his room playing video games and the next thing he remembers is waking up on the floor with EMS around him.  He bit the left side of his tongue.  The tonic-clonic activity lasted for approximately 3-5 minutes at most but he was postictal for at least 20 minutes.  No urinary incontinence.  No recent fevers but he had some sinus infection the week prior.  No sleep deprivation.  No changes in medications.  Denies alcohol or drug use.  There is family history of seizures in patient's greater grandmother who was on phenobarbital.  His great aunt has a seizure disorder an is on zonisamide.  He also has a cousin with seizure disorder who is on oxcarbazepine.  He had febrile seizures as a child and his sister also had febrile seizures as child.  He has been on ADHD medication for impulsive behavior.  He was also having eye blinking which patient is not aware of doing and mother tells me most recently he did this at [employer] on Sunday.  He he may have also had rare jerking movements of his extremities or holds a cup and it may drop out of his hand.  He was treated with Keppra 1000 mg IV x 1 and continued on 500 every 12 hours.  CPK 19.  Head CT showed no acute intracranial process. Of note he had a brain MRI scan with and without contrast done on 12/2/2023 which I reviewed the images independently on his visit today and does not demonstrate any acute intracranial abnormalities.  He has had a normal awake and sleep EEG.  He is doing well with the Keppra 500 mg BID.  He weighs 225 lbs and I  recommend increasing his dose given his weight to 750 mg BID and we can check a level in the future visit.  I discussed seizure precautions including not driving for at least 3 months of seizure freedom, take showers as opposed to baths and staying off of elevated places and heights.  Advised him to not play video games.  Will follow up in 3 months and sooner if needed.

## 2024-01-22 NOTE — PATIENT INSTRUCTIONS
[facility]  Wallace Escobedo MD  Neurology clinic  [phone]    With anti-seizure medications, you may initially notice side effects of fatigue, drowsiness, unsteadiness, and dizziness.  Other possible side effects include nausea, abdominal pain, headache, blurry or double vision, slurred speech and mood changes.  Generally, patients will noticed these symptoms when the medication is first started or with higher doses and will go away with time.    It is import to consistently take your medication every day.  Missing just one dose may put you at risk for a breakthrough seizure.  Consider using reminders on your phone or a pill box.    If you develop a rash, please call the neurology clinic immediately or notify another healthcare professional, as this may be potentially life-threatening.  If you are unable to reach a healthcare professional, go to the emergency room immediately for further evaluation.    If you develop thoughts of wanting to hurt yourself or others, please call the neurology clinic immediately to notify another healthcare professional.  If you are unable to reach a healthcare professional, go to the emergency room immediately for further evaluation.    It is the [place] state law that you cannot drive within 90 days of a seizure.    You should avoid certain activities that if you were to have a seizure, you could harm yourself or others. In general, it is recommended that you avoid operating heavy machinery or power tools, swimming or taking baths by yourself (showers are ok), don't stand over open flames, don't get on high ladders or the roof.  I also recommend to avoid sleeping on your stomach.    For further information on epilepsy and resources available to patients and their families, please visit the [facility] \[facility]\"" at www.efky.org or call [phone].    **Check out the [facility] \[facility]\""'s monthly Art Group Gathering.  They are located  at [facility], [address] [place] [address].  Call Jody Mcfarlane at [phone] or email her at [email or web address] for the dates of future gatherings.**      **If you have having memory problems, consider HOBSCOTCH (Home-Based Self-management and Cognitive Training Changes lives).  It is an 8 week self-management program for adults with epilepsy and memory problems.  The program is free at the [facility].  Contact Dianna Acevedo at [phone] or [email or web address].**         In general, we recommend using good judgement when you are doing certain activities and to avoid those activities that if you were to have a seizure, you could harm yourself or others. In the [facility], it is the law that you cannot drive within 90 days of a seizure. We also recommend not standing over open flames, not getting on high ladders or the roof, not swimming or taking baths by yourself (showers are ok) and not operating heavy machinery or power tools.

## 2024-03-15 NOTE — PROGRESS NOTES
"Chief Complaint  Back Pain (Middle to upper back pain since December. )    Subjective        HPI   Brandon presents to [facility] for follow up. He is accompanied by his mother    Answers submitted by the patient for this visit:  Primary Reason for Visit (Submitted on 3/14/2024)  What is the primary reason for your visit?: Back Pain    Since I last saw him, he saw neurology who agreed he has seizure. He was advised to take Keppra and not play video games. Pt does still play video games.  He presents today with thoracic back discomfort.  Doesn't hurt at rest  Doesn't hurt all the time  Back pain with sneeze/cough, showering, getting OOB, twisting  Sometimes radiates to the front  Has tried ice, did help  No numbness or weakness in legs  No breathing issues  No cough  Asthma controlled          Objective   Vital Signs:  Vitals:    03/18/24 1513   BP: 124/82   BP Location: Left arm   Patient Position: Sitting   Cuff Size: Large Adult   Pulse: 81   SpO2: 97%   Weight: 106 kg (234 lb)   Height: 175.3 cm (69.02\")          Physical Exam  Constitutional:       General: He is not in acute distress.     Appearance: Normal appearance.   HENT:      Head: Normocephalic and atraumatic.   Eyes:      Conjunctiva/sclera: Conjunctivae normal.   Cardiovascular:      Rate and Rhythm: Normal rate.   Pulmonary:      Effort: Pulmonary effort is normal. No respiratory distress.      Breath sounds: No wheezing or rhonchi.   Chest:      Chest wall: No tenderness.   Musculoskeletal:         General: No swelling or deformity.   Skin:     Coloration: Skin is not jaundiced.      Findings: No rash.   Neurological:      General: No focal deficit present.      Mental Status: He is alert and oriented to person, place, and time.   Psychiatric:         Mood and Affect: Mood normal.         Behavior: Behavior normal.         Judgment: Judgment normal.          Result Review :     The following data was reviewed by: " Pamela Ojeda MD on 03/18/2024:  Office Visit with Wallace Escobedo MD (01/22/2024)   XR Chest 2 View (01/03/2024 10:18)  MRI Brain With & Without Contrast (12/02/2023 15:15)  MRI Brain Without Contrast (12/02/2023 11:32)  CT Lumbar Spine Without Contrast (12/02/2023 02:41)  CT Facial Bones Without Contrast (12/02/2023 02:41)  CT Cervical Spine Without Contrast (12/02/2023 02:41)  CT Head Without Contrast (12/02/2023 02:41)         Assessment and Plan    Diagnoses and all orders for this visit:    1. Chronic midline thoracic back pain (Primary)  -     Ambulatory Referral to Physical Therapy Evaluate and treat    2. Physical deconditioning    3. Seizure    Unable to elicit the pain on exam.  Reviewed imaging from emergency department visit in December.  No lumbar fracture, but this pain is higher up the thoracic region. Question of rib fracture on right side on chest x-ray obtained in January.  Pain could be related at least in part to healing from this.  Unclear etiology of pain, no red flags on exam or history. Asthma is controlled. Does not seem to be pleuritic. Before embarking on additional imaging, recommended a trial of physical therapy as they have many modalities to treat back pain.  He certainly has a sedentary lifestyle and poor body mechanics which can increase risk of pain.  Patient and his mother are agreeable to this plan.  He should return to see me if his symptoms persist despite physical therapy or if they worsen.      Follow Up   Return if symptoms worsen or fail to improve.  Patient was given instructions and counseling regarding his condition or for health maintenance advice. Please see specific information pulled into the AVS if appropriate.

## 2024-03-18 ENCOUNTER — OFFICE VISIT (OUTPATIENT)
Dept: FAMILY MEDICINE CLINIC | Facility: CLINIC | Age: 24
End: 2024-03-18
Payer: COMMERCIAL

## 2024-03-18 VITALS
HEART RATE: 81 BPM | BODY MASS INDEX: 34.66 KG/M2 | OXYGEN SATURATION: 97 % | WEIGHT: 234 LBS | SYSTOLIC BLOOD PRESSURE: 124 MMHG | DIASTOLIC BLOOD PRESSURE: 82 MMHG | HEIGHT: 69 IN

## 2024-03-18 DIAGNOSIS — G89.29 CHRONIC MIDLINE THORACIC BACK PAIN: Primary | ICD-10-CM

## 2024-03-18 DIAGNOSIS — R56.9 SEIZURE: ICD-10-CM

## 2024-03-18 DIAGNOSIS — M54.6 CHRONIC MIDLINE THORACIC BACK PAIN: Primary | ICD-10-CM

## 2024-03-18 DIAGNOSIS — R53.81 PHYSICAL DECONDITIONING: ICD-10-CM

## 2024-03-18 PROCEDURE — 99214 OFFICE O/P EST MOD 30 MIN: CPT | Performed by: INTERNAL MEDICINE

## 2024-03-20 ENCOUNTER — TELEPHONE (OUTPATIENT)
Dept: NEUROLOGY | Facility: CLINIC | Age: 24
End: 2024-03-20
Payer: COMMERCIAL

## 2024-03-20 NOTE — TELEPHONE ENCOUNTER
Caller: MONIQUE UREÑA    Relationship: Mother    Best call back number: [phone]    What was the call regarding: PT'S MOTHER STATES PT RECENTLY TESTED POSITIVE FOR COVID-19. GIVEN PT'S SEIZURE DISORDER, PT'S MOTHER WOULD LIKE TO KNOW WHAT DECONGESTANTS ARE SAFE FOR PT TO TAKE?    Do you require a callback: YES, PLEASE.    Is it okay if the provider responds through [email or web address]?: YES    PLEASE REVIEW AND ADVISE.

## 2024-03-27 ENCOUNTER — TELEPHONE (OUTPATIENT)
Dept: NEUROLOGY | Facility: CLINIC | Age: 24
End: 2024-03-27
Payer: COMMERCIAL

## 2024-03-27 NOTE — TELEPHONE ENCOUNTER
Left VM, MyChart (if applicable) & mailed reminder regarding appt reschedule due to provider being out of office.  Had scheduled pt for May 2nd at 3:20 PM

## 2024-04-19 DIAGNOSIS — J45.909 ASTHMA, UNSPECIFIED ASTHMA SEVERITY, UNSPECIFIED WHETHER COMPLICATED, UNSPECIFIED WHETHER PERSISTENT: ICD-10-CM

## 2024-04-19 RX ORDER — CETIRIZINE HYDROCHLORIDE 10 MG/1
10 TABLET ORAL DAILY
Qty: 90 TABLET | Refills: 3 | Status: SHIPPED | OUTPATIENT
Start: 2024-04-19

## 2024-04-19 NOTE — TELEPHONE ENCOUNTER
Rx Refill Note  Requested Prescriptions     Pending Prescriptions Disp Refills    cetirizine (zyrTEC) 10 MG tablet 90 tablet 3     Sig: Take 1 tablet by mouth Daily.      Last office visit with prescribing clinician: 3/18/2024   Last telemedicine visit with prescribing clinician: 1/5/2024   Next office visit with prescribing clinician: 6/5/2024       {TIP  Please add Last Relevant Lab Date if appropriate: 12/03/23                 Would you like a call back once the refill request has been completed: [] Yes [] No    If the office needs to give you a call back, can they leave a voicemail: [] Yes [] No    Kassie Chase MA  04/19/24, 13:47 EDT

## 2024-04-24 DIAGNOSIS — R56.9 SEIZURE: ICD-10-CM

## 2024-04-24 RX ORDER — LEVETIRACETAM 750 MG/1
750 TABLET ORAL 2 TIMES DAILY
Qty: 180 TABLET | Refills: 1 | Status: SHIPPED | OUTPATIENT
Start: 2024-04-24 | End: 2024-04-26 | Stop reason: SDUPTHER

## 2024-04-26 ENCOUNTER — OFFICE VISIT (OUTPATIENT)
Dept: NEUROLOGY | Facility: CLINIC | Age: 24
End: 2024-04-26
Payer: COMMERCIAL

## 2024-04-26 VITALS
HEIGHT: 69 IN | WEIGHT: 232 LBS | BODY MASS INDEX: 34.36 KG/M2 | HEART RATE: 58 BPM | OXYGEN SATURATION: 96 % | DIASTOLIC BLOOD PRESSURE: 76 MMHG | SYSTOLIC BLOOD PRESSURE: 118 MMHG

## 2024-04-26 DIAGNOSIS — R56.9 SEIZURE: Primary | ICD-10-CM

## 2024-04-26 PROCEDURE — 99214 OFFICE O/P EST MOD 30 MIN: CPT | Performed by: PSYCHIATRY & NEUROLOGY

## 2024-04-26 RX ORDER — LEVETIRACETAM 500 MG/1
1000 TABLET, FILM COATED, EXTENDED RELEASE ORAL 2 TIMES DAILY
Qty: 120 TABLET | Refills: 2 | Status: SHIPPED | OUTPATIENT
Start: 2024-04-26

## 2024-04-26 NOTE — LETTER
April 26, 2024       No Recipients    Patient: Brandon Marsh   YOB: 2000   Date of Visit: 4/26/2024     Dear Pamela Ojeda MD:       Thank you for referring Brandon Marsh to me for evaluation. Below are the relevant portions of my assessment and plan of care.    If you have questions, please do not hesitate to call me. I look forward to following Brandon along with you.         Sincerely,        Wallace Escobedo MD        CC:   No Recipients    Wallace Escobedo MD  04/26/24 1203  Sign when Signing Visit  Chief Complaint  Seizures    Subjective         Brandon Marsh presents to [facility] NEUROLOGY for   HISTORY OF PRESENT ILLNESS:    Brandon Marsh is a 23 year old right handed man who returns to neurology clinic with his mother, Dianna, for follow up evaluation and treatment of seizures.  On 12/2/2023 he reportedly experienced seizure like activity close to midnight.  His mother heard a loud bang.  They found him face down seizing.  He was stiff and his legs were under his bed and his head was on the bottom shelf of entertainment [employer] His mother witnessed him seizing with generalized convulsions noted followed by shallow breathing.  He was at home in his room playing video games and the next thing he remembers is waking up on the floor with EMS around him.  He bit the left side of his tongue.  The tonic-clonic activity lasted for approximately 3-5 minutes at most but he was postictal for at least 20 minutes.  No urinary incontinence.  No recent fevers but he had some sinus infection the week prior.  No sleep deprivation.  No changes in medications.  Denies alcohol or drug use.  There is family history of seizures in patient's greater grandmother who was on phenobarbital.  His great aunt has a seizure disorder an is on zonisamide.  He also has a cousin with seizure disorder who is on oxcarbazepine.  He had febrile seizures as a child and his sister also had febrile seizures as  "child.  He has been on ADHD medication for impulsive behavior.  He was also having eye blinking which patient is not aware of doing and mother tells me most recently he did this at [employer] on Sunday.  He he may have also had rare jerking movements of his extremities or holds a cup and it may drop out of his hand.  He was treated with Keppra 1000 mg IV x 1 and continued on 500 every 12 hours.  CPK 19.  Head CT showed no acute intracranial process. Of note he had a brain MRI scan with and without contrast done on 12/2/2023 which does not demonstrate any acute intracranial abnormalities.  He has had a normal awake and sleep EEG.  He is doing well with the Keppra 750 mg BID but still has had some eye twitching spells and some \"dejavu\" type sensations where he felt slightly off.     Past Medical History:   Diagnosis Date   • ADHD (attention deficit hyperactivity disorder)     Not currently on medication   • Allergic    • Asthma    • Clotting disorder     Alpha Thalassemia Trait   • COVID-19 virus detected 12/22/2022   • Gastric hemorrhage due to erosive gastritis 08/31/2022   • GERD (gastroesophageal reflux disease)    • Head injury 12/02/2023    Hit head with seizure   • History of medical problems     Mild spinal bifida   • Seasonal allergies    • Seizures 12/02/2023   • Tachycardia 03/01/2023        Family History   Problem Relation Age of Onset   • Arthritis Mother    • Cervical cancer Mother    • Cancer Mother         Cervical Carcinoma   • Migraines Mother    • Hyperlipidemia Father    • Arthritis Father    • Depression Father    • Anxiety disorder Father    • Heart disease Father         Afib   • Hypertension Father    • Mental illness Father         Bipolar 1   • Anxiety disorder Sister    • Depression Sister    • Miscarriages / Stillbirths Sister    • Breast cancer Maternal Aunt    • Asthma Maternal Aunt    • Miscarriages / Stillbirths Maternal Aunt    • Lung cancer Paternal Grandmother    • Cancer Paternal " "Grandmother         Small Cell Lung Cancer, neuroendocrine tumor in stomach, Basal Cell Carcinoma   • COPD Paternal Grandmother    • Depression Paternal Grandmother    • Heart disease Paternal Grandmother    • Arthritis Paternal Grandmother    • Vision loss Maternal Grandfather         Glaucoma   • Arthritis Maternal Grandmother    • Depression Maternal Grandmother    • Migraines Maternal Grandmother    • Miscarriages / Stillbirths Maternal Grandmother    • Drug abuse Paternal Aunt    • Seizures Paternal Aunt    • Seizures Paternal Aunt         Social History     Socioeconomic History   • Marital status: Single   Tobacco Use   • Smoking status: Never   • Smokeless tobacco: Never   Vaping Use   • Vaping status: Never Used   Substance and Sexual Activity   • Alcohol use: Never   • Drug use: Never   • Sexual activity: Never        I have reviewed and confirmed the accuracy of the ROS as documented by the MA/LPN/RN Wallace Escobedo MD   Review of Systems   Neurological:  Negative for dizziness, tremors, seizures, syncope, facial asymmetry, speech difficulty, weakness, light-headedness, numbness, headache, memory problem and confusion.        Objective  Vital Signs:   /76   Pulse 58   Ht 175.3 cm (69.02\")   Wt 105 kg (232 lb)   SpO2 96%   BMI 34.24 kg/m²       PHYSICAL EXAM:    General   Mental Status - Alert. General Appearance - Well developed, Well groomed, Oriented and Cooperative. Orientation - Oriented X3.       Head and Neck  Head - normocephalic, atraumatic with no lesions or palpable masses.  Neck    Global Assessment - supple.       Eye   Sclera/Conjunctiva - Bilateral - Normal.    ENMT  Mouth and Throat   Oral Cavity/Oropharynx: Oropharynx - the soft palate,uvula and tongue are normal in appearance.    Chest and Lung Exam   Chest - lung clear to auscultation bilaterally.    Cardiovascular   Cardiovascular examination reveals  - normal heart sounds, regular rate and rhythm.    Neurologic   Mental " Status: Speech - Normal. Cognitive function - appropriate fund of knowledge. No impairment of attention, Impairment of concentration, impairment of long term memory or impairment of short term memory.  Cranial Nerves:   II Optic: Visual acuity - Left - Normal. Right - Normal. Visual fields - Normal (to confrontation).  III Oculomotor: Pupillary constriction - Left - Normal. Right - Normal.  VII Facial: - Normal Bilaterally.   IX Glossopharyngeal / X Vagus - Normal.  XI Accessory: Trapezius - Bilateral - Normal. Sternocleidomastoid - Bilateral - Normal.  XII Hypoglossal - Bilateral - Normal.  Eye Movements: - Normal Bilaterally.  Sensory:   Light Touch: Intact - Globally.  Motor:   Bulk and Contour: - Normal.  Tone: - Normal.  Tremor: Not present.  Strength: 5/5 normal muscle strength - All Muscles.   General Assessment of Reflexes: - deep tendon reflexes are normal. Coordination - No Impairment of finger-to-nose or Impairment of rapid alternating movements. Gait - Normal.       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  On 12/2/2023 he reportedly experienced seizure like activity close to midnight.  His mother heard a loud bang.  They found him face down seizing.  He was stiff and his legs were under his bed and his head was on the bottom shelf of entertainment center. His mother witnessed him seizing with generalized convulsions noted followed by shallow breathing.  He was at home in his room playing video games and the next thing he remembers is waking up on the floor with EMS around him.  He bit the left side of his tongue.  The tonic-clonic activity lasted for approximately 3-5 minutes at most but he was postictal for at least 20 minutes.  No urinary incontinence.  " No recent fevers but he had some sinus infection the week prior.  No sleep deprivation.  No changes in medications.  Denies alcohol or drug use.  There is family history of seizures in patient's greater grandmother who was on phenobarbital.  His great aunt has a seizure disorder an is on zonisamide.  He also has a cousin with seizure disorder who is on oxcarbazepine.  He had febrile seizures as a child and his sister also had febrile seizures as child.  He has been on ADHD medication for impulsive behavior.  He was also having eye blinking which patient is not aware of doing and mother tells me most recently he did this at [employer] on Sunday.  He he may have also had rare jerking movements of his extremities or holds a cup and it may drop out of his hand.  He was treated with Keppra 1000 mg IV x 1 and continued on 500 every 12 hours.  CPK 19.  Head CT showed no acute intracranial process. Of note he had a brain MRI scan with and without contrast done on 12/2/2023 which does not demonstrate any acute intracranial abnormalities.  He has had a normal awake and sleep EEG.  He is doing well with the Keppra 750 mg BID but still has had some eye twitching spells and some \"dejavu\" type sensations where he felt slightly off.  I will increase his dose of the levetiracetam to 1000 mg BID and prescribe him the XR formulation.  Discussed seizure precautions including not driving for at least 3 months of seizure freedom, taking showers as opposed to baths and staying off of elevated places and heights and trying to avoid video games that can provoke seizures.  Discussed risk for SUDEP and the fact that seizures can be potentially fatal.           Relevant Medications    levETIRAcetam XR (KEPPRA XR) 500 MG tablet     Follow Up   Return in about 3 months (around 7/26/2024).  Patient was given instructions and counseling regarding his condition or for health maintenance advice. Please see specific information pulled into the AVS if " appropriate.

## 2024-04-26 NOTE — PROGRESS NOTES
Chief Complaint  Seizures    Subjective          Brandon Marsh presents to [facility] NEUROLOGY for   HISTORY OF PRESENT ILLNESS:    Brandon Marsh is a 23 year old right handed man who returns to neurology clinic with his mother, Dianna, for follow up evaluation and treatment of seizures.  On 12/2/2023 he reportedly experienced seizure like activity close to midnight.  His mother heard a loud bang.  They found him face down seizing.  He was stiff and his legs were under his bed and his head was on the bottom shelf of entertainment center. His mother witnessed him seizing with generalized convulsions noted followed by shallow breathing.  He was at home in his room playing video games and the next thing he remembers is waking up on the floor with EMS around him.  He bit the left side of his tongue.  The tonic-clonic activity lasted for approximately 3-5 minutes at most but he was postictal for at least 20 minutes.  No urinary incontinence.  No recent fevers but he had some sinus infection the week prior.  No sleep deprivation.  No changes in medications.  Denies alcohol or drug use.  There is family history of seizures in patient's greater grandmother who was on phenobarbital.  His great aunt has a seizure disorder an is on zonisamide.  He also has a cousin with seizure disorder who is on oxcarbazepine.  He had febrile seizures as a child and his sister also had febrile seizures as child.  He has been on ADHD medication for impulsive behavior.  He was also having eye blinking which patient is not aware of doing and mother tells me most recently he did this at [employer] on Sunday.  He he may have also had rare jerking movements of his extremities or holds a cup and it may drop out of his hand.  He was treated with Keppra 1000 mg IV x 1 and continued on 500 every 12 hours.  CPK 19.  Head CT showed no acute intracranial process. Of note he had a brain MRI scan with and without contrast done on 12/2/2023  "which does not demonstrate any acute intracranial abnormalities.  He has had a normal awake and sleep EEG.  He is doing well with the Keppra 750 mg BID but still has had some eye twitching spells and some \"dejavu\" type sensations where he felt slightly off.     Past Medical History:   Diagnosis Date    ADHD (attention deficit hyperactivity disorder)     Not currently on medication    Allergic     Asthma     Clotting disorder     Alpha Thalassemia Trait    COVID-19 virus detected 12/22/2022    Gastric hemorrhage due to erosive gastritis 08/31/2022    GERD (gastroesophageal reflux disease)     Head injury 12/02/2023    Hit head with seizure    History of medical problems     Mild spinal bifida    Seasonal allergies     Seizures 12/02/2023    Tachycardia 03/01/2023        Family History   Problem Relation Age of Onset    Arthritis Mother     Cervical cancer Mother     Cancer Mother         Cervical Carcinoma    Migraines Mother     Hyperlipidemia Father     Arthritis Father     Depression Father     Anxiety disorder Father     Heart disease Father         Afib    Hypertension Father     Mental illness Father         Bipolar 1    Anxiety disorder Sister     Depression Sister     Miscarriages / Stillbirths Sister     Breast cancer Maternal Aunt     Asthma Maternal Aunt     Miscarriages / Stillbirths Maternal Aunt     Lung cancer Paternal Grandmother     Cancer Paternal Grandmother         Small Cell Lung Cancer, neuroendocrine tumor in stomach, Basal Cell Carcinoma    COPD Paternal Grandmother     Depression Paternal Grandmother     Heart disease Paternal Grandmother     Arthritis Paternal Grandmother     Vision loss Maternal Grandfather         Glaucoma    Arthritis Maternal Grandmother     Depression Maternal Grandmother     Migraines Maternal Grandmother     Miscarriages / Stillbirths Maternal Grandmother     Drug abuse Paternal Aunt     Seizures Paternal Aunt     Seizures Paternal Aunt         Social History " "    Socioeconomic History    Marital status: Single   Tobacco Use    Smoking status: Never    Smokeless tobacco: Never   Vaping Use    Vaping status: Never Used   Substance and Sexual Activity    Alcohol use: Never    Drug use: Never    Sexual activity: Never        I have reviewed and confirmed the accuracy of the ROS as documented by the MA/LPN/RN Wallace Escobedo MD   Review of Systems   Neurological:  Negative for dizziness, tremors, seizures, syncope, facial asymmetry, speech difficulty, weakness, light-headedness, numbness, headache, memory problem and confusion.        Objective   Vital Signs:   /76   Pulse 58   Ht 175.3 cm (69.02\")   Wt 105 kg (232 lb)   SpO2 96%   BMI 34.24 kg/m²       PHYSICAL EXAM:    General   Mental Status - Alert. General Appearance - Well developed, Well groomed, Oriented and Cooperative. Orientation - Oriented X3.       Head and Neck  Head - normocephalic, atraumatic with no lesions or palpable masses.  Neck    Global Assessment - supple.       Eye   Sclera/Conjunctiva - Bilateral - Normal.    ENMT  Mouth and Throat   Oral Cavity/Oropharynx: Oropharynx - the soft palate,uvula and tongue are normal in appearance.    Chest and Lung Exam   Chest - lung clear to auscultation bilaterally.    Cardiovascular   Cardiovascular examination reveals  - normal heart sounds, regular rate and rhythm.    Neurologic   Mental Status: Speech - Normal. Cognitive function - appropriate fund of knowledge. No impairment of attention, Impairment of concentration, impairment of long term memory or impairment of short term memory.  Cranial Nerves:   II Optic: Visual acuity - Left - Normal. Right - Normal. Visual fields - Normal (to confrontation).  III Oculomotor: Pupillary constriction - Left - Normal. Right - Normal.  VII Facial: - Normal Bilaterally.   IX Glossopharyngeal / X Vagus - Normal.  XI Accessory: Trapezius - Bilateral - Normal. Sternocleidomastoid - Bilateral - Normal.  XII Hypoglossal " - Bilateral - Normal.  Eye Movements: - Normal Bilaterally.  Sensory:   Light Touch: Intact - Globally.  Motor:   Bulk and Contour: - Normal.  Tone: - Normal.  Tremor: Not present.  Strength: 5/5 normal muscle strength - All Muscles.   General Assessment of Reflexes: - deep tendon reflexes are normal. Coordination - No Impairment of finger-to-nose or Impairment of rapid alternating movements. Gait - Normal.       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  On 12/2/2023 he reportedly experienced seizure like activity close to midnight.  His mother heard a loud bang.  They found him face down seizing.  He was stiff and his legs were under his bed and his head was on the bottom shelf of entertainment [employer]. His mother witnessed him seizing with generalized convulsions noted followed by shallow breathing.  He was at home in his room playing video games and the next thing he remembers is waking up on the floor with EMS around him.  He bit the left side of his tongue.  The tonic-clonic activity lasted for approximately 3-5 minutes at most but he was postictal for at least 20 minutes.  No urinary incontinence.  No recent fevers but he had some sinus infection the week prior.  No sleep deprivation.  No changes in medications.  Denies alcohol or drug use.  There is family history of seizures in patient's greater grandmother who was on phenobarbital.  His great aunt has a seizure disorder an is on zonisamide.  He also has a cousin with seizure disorder who is on oxcarbazepine.  He had febrile seizures as a child and his sister also had febrile seizures as child.  He has been on ADHD medication for impulsive behavior.  He was also having eye blinking which patient is not aware  "of doing and mother tells me most recently he did this at [employer] on Sunday.  He he may have also had rare jerking movements of his extremities or holds a cup and it may drop out of his hand.  He was treated with Keppra 1000 mg IV x 1 and continued on 500 every 12 hours.  CPK 19.  Head CT showed no acute intracranial process. Of note he had a brain MRI scan with and without contrast done on 12/2/2023 which does not demonstrate any acute intracranial abnormalities.  He has had a normal awake and sleep EEG.  He is doing well with the Keppra 750 mg BID but still has had some eye twitching spells and some \"dejavu\" type sensations where he felt slightly off.  I will increase his dose of the levetiracetam to 1000 mg BID and prescribe him the XR formulation.  Discussed seizure precautions including not driving for at least 3 months of seizure freedom, taking showers as opposed to baths and staying off of elevated places and heights and trying to avoid video games that can provoke seizures.  Discussed risk for SUDEP and the fact that seizures can be potentially fatal.           Relevant Medications    levETIRAcetam XR (KEPPRA XR) 500 MG tablet     Follow Up   Return in about 3 months (around 7/26/2024).  Patient was given instructions and counseling regarding his condition or for health maintenance advice. Please see specific information pulled into the AVS if appropriate.       "

## 2024-04-26 NOTE — PATIENT INSTRUCTIONS
In general, we recommend using good judgement when you are doing certain activities and to avoid those activities that if you were to have a seizure, you could harm yourself or others. In the state of [place], it is the law that you cannot drive within 90 days of a seizure. We also recommend not standing over open flames, not getting on high ladders or the roof, not swimming or taking baths by yourself (showers are ok) and not operating heavy machinery or power tools.  [facility]  Wallace Escobedo MD  Neurology clinic  [phone]    With anti-seizure medications, you may initially notice side effects of fatigue, drowsiness, unsteadiness, and dizziness.  Other possible side effects include nausea, abdominal pain, headache, blurry or double vision, slurred speech and mood changes.  Generally, patients will noticed these symptoms when the medication is first started or with higher doses and will go away with time.    It is import to consistently take your medication every day.  Missing just one dose may put you at risk for a breakthrough seizure.  Consider using reminders on your phone or a pill box.    If you develop a rash, please call the neurology clinic immediately or notify another healthcare professional, as this may be potentially life-threatening.  If you are unable to reach a healthcare professional, go to the emergency room immediately for further evaluation.    If you develop thoughts of wanting to hurt yourself or others, please call the neurology clinic immediately to notify another healthcare professional.  If you are unable to reach a healthcare professional, go to the emergency room immediately for further evaluation.    It is the [place] state law that you cannot drive within 90 days of a seizure.    You should avoid certain activities that if you were to have a seizure, you could harm yourself or others. In general, it is recommended that you avoid operating heavy machinery or  power tools, swimming or taking baths by yourself (showers are ok), don't stand over open flames, don't get on high ladders or the roof.  I also recommend to avoid sleeping on your stomach.    For further information on epilepsy and resources available to patients and their families, please visit the [facility] at [email or web address] or call [phone].    **Check out the [facility]'s monthly Art Group Gathering.  [facility] [address]  Call Jody Mcfarlane at [phone] or email her at [email or web address] for the dates of future gatherings.**      **If you have having memory problems, consider HOBSCOTCH (Home-Based Self-management and Cognitive Training Changes lives).  It is an 8 week self-management program for adults with epilepsy and memory problems.  The program is free at the Epilepsy [facility].  Contact Dianna Acevedo at [phone] or [email or web address].**

## 2024-04-26 NOTE — ASSESSMENT & PLAN NOTE
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  On 12/2/2023 he reportedly experienced seizure like activity close to midnight.  His mother heard a loud bang.  They found him face down seizing.  He was stiff and his legs were under his bed and his head was on the bottom shelf of [employer]. His mother witnessed him seizing with generalized convulsions noted followed by shallow breathing.  He was at home in his room playing video games and the next thing he remembers is waking up on the floor with EMS around him.  He bit the left side of his tongue.  The tonic-clonic activity lasted for approximately 3-5 minutes at most but he was postictal for at least 20 minutes.  No urinary incontinence.  No recent fevers but he had some sinus infection the week prior.  No sleep deprivation.  No changes in medications.  Denies alcohol or drug use.  There is family history of seizures in patient's greater grandmother who was on phenobarbital.  His great aunt has a seizure disorder an is on zonisamide.  He also has a cousin with seizure disorder who is on oxcarbazepine.  He had febrile seizures as a child and his sister also had febrile seizures as child.  He has been on ADHD medication for impulsive behavior.  He was also having eye blinking which patient is not aware of doing and mother tells me most recently he did this at [employer] on Sunday.  He he may have also had rare jerking movements of his extremities or holds a cup and it may drop out of his hand.  He was treated with Keppra 1000 mg IV x 1 and continued on 500 every 12 hours.  CPK 19.  Head CT showed no acute intracranial process. Of note he had a brain MRI scan with and without contrast done on 12/2/2023 which does not demonstrate any acute intracranial abnormalities.  He has had a normal awake and sleep EEG.  He is doing well with the Keppra 750 mg BID but still has had some eye  "twitching spells and some \"dejavu\" type sensations where he felt slightly off.  I will increase his dose of the levetiracetam to 1000 mg BID and prescribe him the XR formulation.  Discussed seizure precautions including not driving for at least 3 months of seizure freedom, taking showers as opposed to baths and staying off of elevated places and heights and trying to avoid video games that can provoke seizures.  Discussed risk for SUDEP and the fact that seizures can be potentially fatal.    "

## 2024-05-06 ENCOUNTER — OFFICE VISIT (OUTPATIENT)
Dept: FAMILY MEDICINE CLINIC | Facility: CLINIC | Age: 24
End: 2024-05-06
Payer: COMMERCIAL

## 2024-05-06 VITALS
SYSTOLIC BLOOD PRESSURE: 124 MMHG | BODY MASS INDEX: 35.01 KG/M2 | OXYGEN SATURATION: 96 % | DIASTOLIC BLOOD PRESSURE: 82 MMHG | WEIGHT: 236.4 LBS | HEIGHT: 69 IN | HEART RATE: 90 BPM

## 2024-05-06 DIAGNOSIS — J45.901 EXACERBATION OF ASTHMA, UNSPECIFIED ASTHMA SEVERITY, UNSPECIFIED WHETHER PERSISTENT: Primary | ICD-10-CM

## 2024-05-06 PROCEDURE — 99214 OFFICE O/P EST MOD 30 MIN: CPT | Performed by: INTERNAL MEDICINE

## 2024-05-06 RX ORDER — PREDNISONE 20 MG/1
40 TABLET ORAL DAILY
Qty: 10 TABLET | Refills: 0 | Status: SHIPPED | OUTPATIENT
Start: 2024-05-06 | End: 2024-05-11

## 2024-06-13 ENCOUNTER — TELEPHONE (OUTPATIENT)
Dept: NEUROLOGY | Facility: CLINIC | Age: 24
End: 2024-06-13
Payer: COMMERCIAL

## 2024-06-13 DIAGNOSIS — H02.59 EXCESSIVE BLINKING: Primary | ICD-10-CM

## 2024-06-13 DIAGNOSIS — R56.9 SEIZURE: ICD-10-CM

## 2024-06-13 NOTE — TELEPHONE ENCOUNTER
I don't see where you were referring him for movement disorder? I see where you were discussing abnormal blinking, but no referral placed. Please advise  
Provider: DR RODRÍGUEZ    Caller: MONIQUE    Relationship to Patient: MOTHER    Phone Number: [phone]    Reason for Call: CALLED TO SEE WHERE PATIENT IS BEING REFERRED TO FOR THE MOVEMENT DISORDER SPECIALIST. ALSO WANTED TO SEE IF PATIENT NEEDED REFERRAL TO DR RODRIGUEZ, STATES THEY HAD DISCUSSED IT AT FIRST VISIT REGARDING SOMETHING THAT WAS FOUND ON HIS MRI. PLEASE REVIEW AND ADVISE, THANK YOU.  
Pt would like Darwin- spoke with mom, she said pt they had already seen ENT for the cyst - there ewas another reading pt had done she is going to upload and send us to be sure a referral isnt needed.  
tho

## 2024-06-14 ENCOUNTER — TELEPHONE (OUTPATIENT)
Dept: NEUROLOGY | Facility: CLINIC | Age: 24
End: 2024-06-14
Payer: COMMERCIAL

## 2024-06-14 DIAGNOSIS — Q28.1 FENESTRATION OF BASILAR ARTERY: Primary | ICD-10-CM

## 2024-06-14 NOTE — TELEPHONE ENCOUNTER
"----- Message from Wallace Escobedo sent at 6/14/2024  9:36 AM EDT -----  Regarding: FW: Test results   Contact: [phone]  We can have [facility]US take a look and see what they think, it does not appear to be causing problems at this time and is an incidental finding which the neuro-radiologist did not seem to find concerning but we can refer or have NUS look over it for further evaluation and future follow up if necessary.  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 Should a referral be placed for this?  ----- Message -----  From: Wallace Escobedo MD  Sent: 6/14/2024   9:14 AM EDT  To: Jennifer Lazo MA  Subject: FW: Test results                                 That's a normal result.  Not sure what she is referring to.  ----- Message -----  From: Jennifer Lazo MA  Sent: 6/14/2024   7:27 AM EDT  To: Wallace Escobedo MD  Subject: FW: Test results                                   ----- Message -----  From: Brandon Marsh  Sent: 6/13/2024   6:43 PM EDT  To: [facility]  Subject: Test results                                     Here are the results I was referring to for the referral to Dr Campos.  "

## 2024-06-19 NOTE — PROGRESS NOTES
Subjective   Patient ID: Brandon Marsh is a 23 y.o. male is being seen for consultation today at the request of Wallace Escobedo MD for fenestration basilar artery. MRI Brain With & Without Contrast on 12-. MRI brain without contrast was completed on 12-.CT head without contrast was completed on 12-.    History of Present Illness  23 year old right handed male who presents with his mother, Dianna, for follow up on imaging.  On 12/2/2023 he reportedly experienced seizure like activity close to midnight.  His mother heard a loud bang.  They found him face down seizing.  He was stiff and his legs were under his bed and his head was on the bottom shelf of entertainment center. His mother witnessed him seizing with generalized convulsions noted followed by shallow breathing.  He was at home in his room playing video games and the next thing he remembers is waking up on the floor with EMS around him.  He bit the left side of his tongue.  The tonic-clonic activity lasted for approximately 3-5 minutes at most but he was postictal for at least 20 minutes.  No urinary incontinence.  No recent fevers but he had some sinus infection the week prior.  No sleep deprivation.  No changes in medications.  Denies alcohol or drug use.  There is family history of seizures in patient's greater grandmother who was on phenobarbital.  His great aunt has a seizure disorder an is on zonisamide.  He also has a cousin with seizure disorder who is on oxcarbazepine.  He had febrile seizures as a child and his sister also had febrile seizures as child.  He has been on ADHD medication for impulsive behavior.  He was also having eye blinking which patient is not aware of doing and mother tells me most recently he did this at [employer] on Sunday.  He he may have also had rare jerking movements of his extremities or holds a cup and it may drop out of his hand.  He was treated with Keppra 1000 mg IV x 1 and continued on 500 every 12  "hours.  CPK 19.  Head CT showed no acute intracranial process. 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  He has had a normal awake and sleep EEG.  He is doing well with the Keppra 750 mg BID but still has had some eye twitching spells and some \"dejavu\" type sensations where he felt slightly off.       The following portions of the patient's history were reviewed and updated as appropriate: He  has a past medical history of ADHD (attention deficit hyperactivity disorder), Allergic, Asthma, Clotting disorder, COVID-19 virus detected (12/22/2022), Gastric hemorrhage due to erosive gastritis (08/31/2022), GERD (gastroesophageal reflux disease), Head injury (12/02/2023), History of medical problems, Seasonal allergies, Seizures (12/02/2023), and Tachycardia (03/01/2023).  He does not have any pertinent problems on file.  He  has a past surgical history that includes Esophagogastroduodenoscopy (N/A); Colonoscopy; Upper gastrointestinal endoscopy; and Esophagogastroduodenoscopy (N/A, 11/17/2022).  His family history includes Anxiety disorder in his father and sister; Arthritis in his father, maternal grandmother, mother, and paternal grandmother; Asthma in his maternal aunt; Breast cancer in his maternal aunt; COPD in his paternal grandmother; Cancer in his mother and paternal grandmother; Cervical cancer in his mother; Depression in his father, maternal grandmother, paternal grandmother, and sister; Drug abuse in his paternal aunt; Heart disease in his father and paternal grandmother; Hyperlipidemia in his father; Hypertension in his father; Lung cancer in his paternal grandmother; Mental illness in his father; Migraines in his maternal grandmother and mother; Miscarriages / Stillbirths in his maternal aunt, maternal grandmother, and sister; Seizures in his paternal aunt and paternal " aunt; Vision loss in his maternal grandfather.  He  reports that he has never smoked. He has never used smokeless tobacco. He reports that he does not drink alcohol and does not use drugs.  Current Outpatient Medications   Medication Sig Dispense Refill    budesonide (PULMICORT) 180 MCG/ACT inhaler Inhale 2 puffs 2 (Two) Times a Day. 1 each 0    cetirizine (zyrTEC) 10 MG tablet Take 1 tablet by mouth Daily. 90 tablet 3    EPINEPHrine (EpiPen 2-Riaz) 0.3 MG/0.3ML solution auto-injector injection Inject 0.3 mL into the appropriate muscle as directed by prescriber 1 (One) Time As Needed (anaphylaxis). May repeat in 5-15 minutes x 1 if Brandon does not have adequate response to the first dose 1 each 2    lansoprazole (Prevacid) 30 MG capsule Take 1 capsule by mouth Daily. 90 capsule 3    levalbuterol (XOPENEX HFA) 45 MCG/ACT inhaler Inhale 1-2 puffs Every 4 (Four) Hours As Needed for Wheezing. 15 g 6    levalbuterol (XOPENEX) 0.63 MG/3ML nebulizer solution Take 1 ampule by nebulization Every 4 (Four) Hours As Needed for Wheezing or Shortness of Air. 180 mL 5    levETIRAcetam XR (KEPPRA XR) 500 MG tablet Take 2 tablets by mouth 2 (Two) Times a Day. 120 tablet 2    montelukast (SINGULAIR) 10 MG tablet Take 1 tablet by mouth every night at bedtime. 90 tablet 3    multivitamin with minerals (MULTIVITAMIN ADULT PO) Take 1 tablet by mouth Daily.       Current Facility-Administered Medications   Medication Dose Route Frequency Provider Last Rate Last Admin    levalbuterol (XOPENEX HFA) inhaler 2 puff  2 puff Inhalation Q4H PRN Moreno Guillen MD         Current Outpatient Medications on File Prior to Visit   Medication Sig    budesonide (PULMICORT) 180 MCG/ACT inhaler Inhale 2 puffs 2 (Two) Times a Day.    cetirizine (zyrTEC) 10 MG tablet Take 1 tablet by mouth Daily.    EPINEPHrine (EpiPen 2-Riaz) 0.3 MG/0.3ML solution auto-injector injection Inject 0.3 mL into the appropriate muscle as directed by prescriber 1 (One) Time As  "Needed (anaphylaxis). May repeat in 5-15 minutes x 1 if Brandon does not have adequate response to the first dose    lansoprazole (Prevacid) 30 MG capsule Take 1 capsule by mouth Daily.    levalbuterol (XOPENEX HFA) 45 MCG/ACT inhaler Inhale 1-2 puffs Every 4 (Four) Hours As Needed for Wheezing.    levalbuterol (XOPENEX) 0.63 MG/3ML nebulizer solution Take 1 ampule by nebulization Every 4 (Four) Hours As Needed for Wheezing or Shortness of Air.    levETIRAcetam XR (KEPPRA XR) 500 MG tablet Take 2 tablets by mouth 2 (Two) Times a Day.    montelukast (SINGULAIR) 10 MG tablet Take 1 tablet by mouth every night at bedtime.    multivitamin with minerals (MULTIVITAMIN ADULT PO) Take 1 tablet by mouth Daily.     Current Facility-Administered Medications on File Prior to Visit   Medication    levalbuterol (XOPENEX HFA) inhaler 2 puff     He is allergic to nuts..    Review of Systems   Neurological:  Negative for dizziness, facial asymmetry, speech difficulty, light-headedness and headaches.   All other systems reviewed and are negative.    Objective     Vitals:    06/20/24 0833   BP: 120/80   Pulse: 94   Temp: 98.6 °F (37 °C)   SpO2: 97%   Weight: 117 kg (258 lb)   Height: 175.3 cm (69.02\")     Body mass index is 38.08 kg/m².      Physical Exam  Vitals and nursing note reviewed.   Constitutional:       General: He is not in acute distress.     Appearance: He is obese.   HENT:      Head: Normocephalic.      Nose: Nose normal.      Mouth/Throat:      Mouth: Mucous membranes are moist.   Eyes:      Extraocular Movements: Extraocular movements intact.      Conjunctiva/sclera: Conjunctivae normal.   Cardiovascular:      Rate and Rhythm: Normal rate.   Pulmonary:      Effort: Pulmonary effort is normal.   Musculoskeletal:         General: Normal range of motion.      Cervical back: Normal range of motion.   Skin:     General: Skin is warm and dry.   Neurological:      General: No focal deficit present.      Mental Status: He is " alert and oriented to person, place, and time.      Cranial Nerves: No cranial nerve deficit.      Sensory: No sensory deficit.      Motor: No weakness.      Coordination: Coordination normal.      Gait: Gait normal.     Neurologic Exam     Mental Status   Oriented to person, place, and time.           Assessment & Plan   Independent Review of Radiographic Studies:      I personally reviewed the images from the following studies.    The MRI with and without contrast dated 2023 was reviewed with the patient and shows a basilar fenestration present, but otherwise no significant signal abnormalities or anatomic pathology.    Medical Decision Makin-year-old male with history of seizures being followed by neurology and doing well on Keppra who had imaging with an abnormality reported which was of concern to the family and referring provider.  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  This results from variation of development of the vascular system.  The only caveat with this finding is that approximately 7% of people with this will develop fenestral aneurysms, but he does not have any other aneurysm risk factors at this time.  No follow-up is needed and the patient should not be concerned of this finding moving forward.  Diagnoses and all orders for this visit:    1. Imaging abnormality (Primary)    2. Seizure    3. Attention deficit hyperactivity disorder (ADHD), unspecified ADHD type    4. Alpha thalassemia trait    5. Obesity (BMI 30-39.9)      Return if symptoms worsen or fail to improve.  Continue with Seizure management under Neurology care.

## 2024-06-19 NOTE — PROGRESS NOTES
"Chief Complaint  Seizures (6 month follow up )    Subjective        HPI   Brandon presents to [facility] for follow up. He is with his mom today    Seizure disorder: Blinking episodes to be evaluated my movement specialist at [facility]  Used to be on a reglan as a kid  Saw Dr. Loo for MRI abnormalities, thought ultimately to be of no consequence.    Back pain is mostly gone  Did not do PT  Not exercising regulalry    Asthma:  Still not taking pulmicort daily, gets busy and states he forgets   Keeps by all his other medications  No recent Xopenex use          Objective   Vital Signs:  Vitals:    06/24/24 1553   BP: 114/76   BP Location: Left arm   Patient Position: Sitting   Cuff Size: Large Adult   Pulse: 89   SpO2: 98%   Weight: 108 kg (238 lb)   Height: 175.3 cm (69.02\")          Physical Exam  Constitutional:       General: He is not in acute distress.     Appearance: Normal appearance.   HENT:      Head: Normocephalic and atraumatic.   Eyes:      Conjunctiva/sclera: Conjunctivae normal.   Cardiovascular:      Rate and Rhythm: Normal rate.   Pulmonary:      Effort: Pulmonary effort is normal. No respiratory distress.      Breath sounds: No wheezing or rhonchi.   Musculoskeletal:         General: No swelling or deformity.   Skin:     Coloration: Skin is not jaundiced.      Findings: No rash.   Neurological:      General: No focal deficit present.      Mental Status: He is alert and oriented to person, place, and time.   Psychiatric:         Mood and Affect: Mood normal.         Behavior: Behavior normal.         Judgment: Judgment normal.          Result Review :     The following data was reviewed by: Pamela Ojeda MD on 06/24/2024:  Patient Message with Wallace Escoebdo MD (06/13/2024)   Patient Message with Wallace Escobedo MD (05/22/2024)   Progress Notes by Haja Loo MD (06/20/2024 08:30)          Assessment and Plan    Diagnoses and all orders for this visit:    1. " Asthma, unspecified asthma severity, unspecified whether complicated, unspecified whether persistent (Primary)  Assessment & Plan:  Still is not taking his Pulmicort regularly, strongly encouraged him to do so as he has had at least 2 exacerbations this year  Continue Singulair, Zyrtec, and Xopenex as needed    Orders:  -     montelukast (SINGULAIR) 10 MG tablet; Take 1 tablet by mouth every night at bedtime.  Dispense: 90 tablet; Refill: 2  -     levalbuterol (XOPENEX HFA) 45 MCG/ACT inhaler; Inhale 1-2 puffs Every 4 (Four) Hours As Needed for Wheezing.  Dispense: 15 g; Refill: 3  -     budesonide (PULMICORT) 180 MCG/ACT inhaler; Inhale 2 puffs 2 (Two) Times a Day.  Dispense: 1 each; Refill: 5    2. Seizure  Assessment & Plan:  This has been extensively evaluated by his neurologist Dr. Escobedo. He is on Keppra.       3. Physical deconditioning  Assessment & Plan:  Patient does not exercise.  Discussed recommendations for at least 150 minutes of exercise per week.  Starting with walking is great.          Follow Up   Return in about 6 months (around 12/24/2024) for Recheck, Next scheduled follow up.  Patient was given instructions and counseling regarding his condition or for health maintenance advice. Please see specific information pulled into the AVS if appropriate.

## 2024-06-20 ENCOUNTER — OFFICE VISIT (OUTPATIENT)
Dept: NEUROSURGERY | Facility: CLINIC | Age: 24
End: 2024-06-20
Payer: COMMERCIAL

## 2024-06-20 VITALS
WEIGHT: 258 LBS | OXYGEN SATURATION: 97 % | TEMPERATURE: 98.6 F | HEIGHT: 69 IN | DIASTOLIC BLOOD PRESSURE: 80 MMHG | HEART RATE: 94 BPM | BODY MASS INDEX: 38.21 KG/M2 | SYSTOLIC BLOOD PRESSURE: 120 MMHG

## 2024-06-20 DIAGNOSIS — D56.3 ALPHA THALASSEMIA TRAIT: ICD-10-CM

## 2024-06-20 DIAGNOSIS — E66.9 OBESITY (BMI 30-39.9): ICD-10-CM

## 2024-06-20 DIAGNOSIS — R93.89 IMAGING ABNORMALITY: Primary | ICD-10-CM

## 2024-06-20 DIAGNOSIS — R56.9 SEIZURE: ICD-10-CM

## 2024-06-20 DIAGNOSIS — F90.9 ATTENTION DEFICIT HYPERACTIVITY DISORDER (ADHD), UNSPECIFIED ADHD TYPE: ICD-10-CM

## 2024-06-20 PROCEDURE — 99203 OFFICE O/P NEW LOW 30 MIN: CPT | Performed by: RADIOLOGY

## 2024-06-24 ENCOUNTER — OFFICE VISIT (OUTPATIENT)
Dept: FAMILY MEDICINE CLINIC | Facility: CLINIC | Age: 24
End: 2024-06-24
Payer: COMMERCIAL

## 2024-06-24 VITALS
DIASTOLIC BLOOD PRESSURE: 76 MMHG | HEIGHT: 69 IN | SYSTOLIC BLOOD PRESSURE: 114 MMHG | HEART RATE: 89 BPM | WEIGHT: 238 LBS | OXYGEN SATURATION: 98 % | BODY MASS INDEX: 35.25 KG/M2

## 2024-06-24 DIAGNOSIS — J45.909 ASTHMA, UNSPECIFIED ASTHMA SEVERITY, UNSPECIFIED WHETHER COMPLICATED, UNSPECIFIED WHETHER PERSISTENT: Primary | ICD-10-CM

## 2024-06-24 DIAGNOSIS — R53.81 PHYSICAL DECONDITIONING: ICD-10-CM

## 2024-06-24 DIAGNOSIS — R56.9 SEIZURE: ICD-10-CM

## 2024-06-24 PROCEDURE — 99214 OFFICE O/P EST MOD 30 MIN: CPT | Performed by: INTERNAL MEDICINE

## 2024-06-24 RX ORDER — MONTELUKAST SODIUM 10 MG/1
10 TABLET ORAL
Qty: 90 TABLET | Refills: 2 | Status: SHIPPED | OUTPATIENT
Start: 2024-06-24

## 2024-06-24 RX ORDER — LEVALBUTEROL TARTRATE 45 UG/1
1-2 AEROSOL, METERED ORAL EVERY 4 HOURS PRN
Qty: 15 G | Refills: 3 | Status: SHIPPED | OUTPATIENT
Start: 2024-06-24

## 2024-06-24 NOTE — ASSESSMENT & PLAN NOTE
Patient does not exercise.  Discussed recommendations for at least 150 minutes of exercise per week.  Starting with walking is great.

## 2024-06-24 NOTE — ASSESSMENT & PLAN NOTE
Still is not taking his Pulmicort regularly, strongly encouraged him to do so as he has had at least 2 exacerbations this year  Continue Singulair, Zyrtec, and Xopenex as needed

## 2024-06-24 NOTE — ASSESSMENT & PLAN NOTE
This has been extensively evaluated by his neurologist Dr. Escobedo. He is on [facility].

## 2024-07-21 DIAGNOSIS — R56.9 SEIZURE: ICD-10-CM

## 2024-07-22 ENCOUNTER — OFFICE VISIT (OUTPATIENT)
Dept: NEUROLOGY | Facility: CLINIC | Age: 24
End: 2024-07-22
Payer: COMMERCIAL

## 2024-07-22 VITALS
BODY MASS INDEX: 35.1 KG/M2 | WEIGHT: 237 LBS | OXYGEN SATURATION: 98 % | SYSTOLIC BLOOD PRESSURE: 130 MMHG | HEIGHT: 69 IN | HEART RATE: 80 BPM | DIASTOLIC BLOOD PRESSURE: 72 MMHG

## 2024-07-22 DIAGNOSIS — R56.9 SEIZURES: Primary | ICD-10-CM

## 2024-07-22 PROCEDURE — 99214 OFFICE O/P EST MOD 30 MIN: CPT | Performed by: PSYCHIATRY & NEUROLOGY

## 2024-07-22 RX ORDER — LEVETIRACETAM 500 MG/1
1000 TABLET, FILM COATED, EXTENDED RELEASE ORAL 2 TIMES DAILY
Qty: 120 TABLET | Refills: 2 | Status: SHIPPED | OUTPATIENT
Start: 2024-07-22

## 2024-07-22 RX ORDER — LEVETIRACETAM 500 MG/1
1000 TABLET, FILM COATED, EXTENDED RELEASE ORAL 2 TIMES DAILY
Qty: 360 TABLET | OUTPATIENT
Start: 2024-07-22

## 2024-07-22 NOTE — ASSESSMENT & PLAN NOTE
"23 year old right handed man with seizures and undefined epilepsy.  On 12/2/2023 he reportedly experienced seizure like activity close to midnight.  His mother heard a loud bang.  They found him face down seizing.  He was stiff and his legs were under his bed and his head was on the bottom shelf of entertainment center. His mother witnessed him seizing with generalized convulsions noted followed by shallow breathing.  He was at home in his room playing video games and the next thing he remembers is waking up on the floor with EMS around him.  He bit the left side of his tongue.  The tonic-clonic activity lasted for approximately 3-5 minutes at most but he was postictal for at least 20 minutes.  No urinary incontinence.  No recent fevers but he had some sinus infection the week prior.  No sleep deprivation.  No changes in medications.  Denies alcohol or drug use.  There is family history of seizures in patient's greater grandmother who was on phenobarbital.  His great aunt has a seizure disorder an is on zonisamide.  He also has a cousin with seizure disorder who is on oxcarbazepine.  He had febrile seizures as a child and his sister also had febrile seizures as child.  He has been on ADHD medication for impulsive behavior.  He was also having eye blinking which patient is not aware of doing.  He he may have also had rare jerking movements of his extremities or holds a cup and it may drop out of his hand.  He was treated with Keppra 1000 mg IV x 1 and continued on 500 every 12 hours.  CPK 19.  Head CT showed no acute intracranial process. Of note he had a brain MRI scan with and without contrast done on 12/2/2023 which does not demonstrate any acute intracranial abnormalities.  He has had a normal awake and sleep EEG.  He was doing well with the levetiracetam 750 mg BID but still has had some eye twitching spells and some \"dejavu\" type sensations where he felt slightly off at his last appointment so I increased his " dose of the levetiracetam XR 1000 mg BID which he reports tolerating well with no further seizures since last visit and he is tolerating the medicine well without any noticeable side effects.  Discussed seizure precautions including not driving for at least 3 months of seizure freedom, taking showers as opposed to baths and staying off of elevated places and heights and trying to avoid video games that can provoke seizures. Discussed risk for SUDEP and the fact that seizures can be potentially fatal.

## 2024-07-22 NOTE — PATIENT INSTRUCTIONS
[facility]  Wallace Escobedo MD  Neurology clinic  [phone]    With anti-seizure medications, you may initially notice side effects of fatigue, drowsiness, unsteadiness, and dizziness.  Other possible side effects include nausea, abdominal pain, headache, blurry or double vision, slurred speech and mood changes.  Generally, patients will noticed these symptoms when the medication is first started or with higher doses and will go away with time.    It is import to consistently take your medication every day.  Missing just one dose may put you at risk for a breakthrough seizure.  Consider using reminders on your phone or a pill box.    If you develop a rash, please call the neurology clinic immediately or notify another healthcare professional, as this may be potentially life-threatening.  If you are unable to reach a healthcare professional, go to the emergency room immediately for further evaluation.    If you develop thoughts of wanting to hurt yourself or others, please call the neurology clinic immediately to notify another healthcare professional.  If you are unable to reach a healthcare professional, go to the emergency room immediately for further evaluation.    It is the [place] state law that you cannot drive within 90 days of a seizure.    You should avoid certain activities that if you were to have a seizure, you could harm yourself or others. In general, it is recommended that you avoid operating heavy machinery or power tools, swimming or taking baths by yourself (showers are ok), don't stand over open flames, don't get on high ladders or the roof.  I also recommend to avoid sleeping on your stomach.    For further information on epilepsy and resources available to patients and their families, please visit the [facility] at www.efky.org or call [phone].    **Check out the [facility]'s monthly Art Group Gathering.  They are located  at [facility], [address] [place] [address].  Call Jody Mcfarlane at [phone] or email her at [email or web address] for the dates of future gatherings.**      **If you have having memory problems, consider HOBSCOTCH (Home-Based Self-management and Cognitive Training Changes lives).  It is an 8 week self-management program for adults with epilepsy and memory problems.  The program is free at the [facility].  Contact Dianna Acevedo at [phone] or [email or web address].**         In general, we recommend using good judgement when you are doing certain activities and to avoid those activities that if you were to have a seizure, you could harm yourself or others. In the [facility], it is the law that you cannot drive within 90 days of a seizure. We also recommend not standing over open flames, not getting on high ladders or the roof, not swimming or taking baths by yourself (showers are ok) and not operating heavy machinery or power tools.     None

## 2024-07-22 NOTE — PROGRESS NOTES
Chief Complaint  Seizures    Subjective          Brandon Marsh presents to [facility]UR[facility] for   HISTORY OF PRESENT ILLNESS:    Brandon Marsh is a 23 year old right handed man who returns to neurology clinic with his mother, Dianna, for follow up evaluation and treatment of seizures.  On 12/2/2023 he reportedly experienced seizure like activity close to midnight.  His mother heard a loud bang.  They found him face down seizing.  He was stiff and his legs were under his bed and his head was on the bottom shelf of entertainment center. His mother witnessed him seizing with generalized convulsions noted followed by shallow breathing.  He was at home in his room playing video games and the next thing he remembers is waking up on the floor with EMS around him.  He bit the left side of his tongue.  The tonic-clonic activity lasted for approximately 3-5 minutes at most but he was postictal for at least 20 minutes.  No urinary incontinence.  No recent fevers but he had some sinus infection the week prior.  No sleep deprivation.  No changes in medications.  Denies alcohol or drug use.  There is family history of seizures in patient's greater grandmother who was on phenobarbital.  His great aunt has a seizure disorder an is on zonisamide.  He also has a cousin with seizure disorder who is on oxcarbazepine.  He had febrile seizures as a child and his sister also had febrile seizures as child.  He has been on ADHD medication for impulsive behavior.  He was also having eye blinking which patient is not aware of doing.  He he may have also had rare jerking movements of his extremities or holds a cup and it may drop out of his hand.  He was treated with Keppra 1000 mg IV x 1 and continued on 500 every 12 hours.  CPK 19.  Head CT showed no acute intracranial process. Of note he had a brain MRI scan with and without contrast done on 12/2/2023 which does not demonstrate any acute intracranial abnormalities.  He  "has had a normal awake and sleep EEG.  He was doing well with the levetiracetam 750 mg BID but still has had some eye twitching spells and some \"dejavu\" type sensations where he felt slightly off at his last appointment so I increased his dose of the levetiracetam XR 1000 mg BID which he reports tolerating well with no further seizures since last visit.     Past Medical History:   Diagnosis Date    ADHD (attention deficit hyperactivity disorder)     Not currently on medication    Allergic     Asthma     Clotting disorder     Alpha Thalassemia Trait    COVID-19 virus detected 12/22/2022    Gastric hemorrhage due to erosive gastritis 08/31/2022    GERD (gastroesophageal reflux disease)     Head injury 12/02/2023    Hit head with seizure    History of medical problems     Mild spinal bifida    Seasonal allergies     Seizures 12/02/2023    Tachycardia 03/01/2023        Family History   Problem Relation Age of Onset    Arthritis Mother     Cervical cancer Mother     Cancer Mother         Cervical Carcinoma    Migraines Mother     Hyperlipidemia Father     Arthritis Father     Depression Father     Anxiety disorder Father     Heart disease Father         Afib    Hypertension Father     Mental illness Father         Bipolar 1    Anxiety disorder Sister     Depression Sister     Miscarriages / Stillbirths Sister     Breast cancer Maternal Aunt     Asthma Maternal Aunt     Miscarriages / Stillbirths Maternal Aunt     Lung cancer Paternal Grandmother     Cancer Paternal Grandmother         Small Cell Lung Cancer, neuroendocrine tumor in stomach, Basal Cell Carcinoma    COPD Paternal Grandmother     Depression Paternal Grandmother     Heart disease Paternal Grandmother     Arthritis Paternal Grandmother     Vision loss Maternal Grandfather         Glaucoma    Arthritis Maternal Grandmother     Depression Maternal Grandmother     Migraines Maternal Grandmother     Miscarriages / Stillbirths Maternal Grandmother     Drug abuse " "Paternal Aunt     Seizures Paternal Aunt     Seizures Paternal Aunt         Social History     Socioeconomic History    Marital status: Single   Tobacco Use    Smoking status: Never    Smokeless tobacco: Never   Vaping Use    Vaping status: Never Used   Substance and Sexual Activity    Alcohol use: Never    Drug use: Never    Sexual activity: Never        I have reviewed and confirmed the accuracy of the ROS as documented by the MA/LPN/RN Wallace Escobedo MD   Review of Systems   Neurological:  Negative for dizziness, tremors, seizures, syncope, facial asymmetry, speech difficulty, weakness, light-headedness, numbness, headache, memory problem and confusion.        Objective   Vital Signs:   /72   Pulse 80   Ht 175.3 cm (69.02\")   Wt 108 kg (237 lb)   SpO2 98%   BMI 34.98 kg/m²       PHYSICAL EXAM:    General   Mental Status - Alert. General Appearance - Well developed, Well groomed, Oriented and Cooperative. Orientation - Oriented X3.       Head and Neck  Head - normocephalic, atraumatic with no lesions or palpable masses.  Neck    Global Assessment - supple.       Eye   Sclera/Conjunctiva - Bilateral - Normal.    ENMT  Mouth and Throat   Oral Cavity/Oropharynx: Oropharynx - the soft palate,uvula and tongue are normal in appearance.    Chest and Lung Exam   Chest - lung clear to auscultation bilaterally.    Cardiovascular   Cardiovascular examination reveals  - normal heart sounds, regular rate and rhythm.    Neurologic   Mental Status: Speech - Normal. Cognitive function - appropriate fund of knowledge. No impairment of attention, Impairment of concentration, impairment of long term memory or impairment of short term memory.  Cranial Nerves:   II Optic: Visual acuity - Left - Normal. Right - Normal. Visual fields - Normal (to confrontation).  III Oculomotor: Pupillary constriction - Left - Normal. Right - Normal.  VII Facial: - Normal Bilaterally.   IX Glossopharyngeal / X Vagus - Normal.  XI Accessory: " Trapezius - Bilateral - Normal. Sternocleidomastoid - Bilateral - Normal.  XII Hypoglossal - Bilateral - Normal.  Eye Movements: - Normal Bilaterally.  Sensory:   Light Touch: Intact - Globally.  Motor:   Bulk and Contour: - Normal.  Tone: - Normal.  Tremor: Not present.  Strength: 5/5 normal muscle strength - All Muscles.   General Assessment of Reflexes: - deep tendon reflexes are normal. Coordination - No Impairment of finger-to-nose or Impairment of rapid alternating movements. Gait - Normal.       Result Review :                 Assessment and Plan    Problem List Items Addressed This Visit          Neuro    Seizures - Primary    Current Assessment & Plan     23 year old right handed man with seizures and undefined epilepsy.  On 12/2/2023 he reportedly experienced seizure like activity close to midnight.  His mother heard a loud bang.  They found him face down seizing.  He was stiff and his legs were under his bed and his head was on the bottom shelf of entertainment center. His mother witnessed him seizing with generalized convulsions noted followed by shallow breathing.  He was at home in his room playing video games and the next thing he remembers is waking up on the floor with EMS around him.  He bit the left side of his tongue.  The tonic-clonic activity lasted for approximately 3-5 minutes at most but he was postictal for at least 20 minutes.  No urinary incontinence.  No recent fevers but he had some sinus infection the week prior.  No sleep deprivation.  No changes in medications.  Denies alcohol or drug use.  There is family history of seizures in patient's greater grandmother who was on phenobarbital.  His great aunt has a seizure disorder an is on zonisamide.  He also has a cousin with seizure disorder who is on oxcarbazepine.  He had febrile seizures as a child and his sister also had febrile seizures as child.  He has been on ADHD medication for impulsive behavior.  He was also having eye blinking  "which patient is not aware of doing.  He he may have also had rare jerking movements of his extremities or holds a cup and it may drop out of his hand.  He was treated with Keppra 1000 mg IV x 1 and continued on 500 every 12 hours.  CPK 19.  Head CT showed no acute intracranial process. Of note he had a brain MRI scan with and without contrast done on 12/2/2023 which does not demonstrate any acute intracranial abnormalities.  He has had a normal awake and sleep EEG.  He was doing well with the levetiracetam 750 mg BID but still has had some eye twitching spells and some \"dejavu\" type sensations where he felt slightly off at his last appointment so I increased his dose of the levetiracetam XR 1000 mg BID which he reports tolerating well with no further seizures since last visit and he is tolerating the medicine well without any noticeable side effects.  Discussed seizure precautions including not driving for at least 3 months of seizure freedom, taking showers as opposed to baths and staying off of elevated places and heights and trying to avoid video games that can provoke seizures. Discussed risk for SUDEP and the fact that seizures can be potentially fatal.          Relevant Medications    levETIRAcetam XR (KEPPRA XR) 500 MG tablet       Follow Up   Return in about 3 months (around 10/22/2024).  Patient was given instructions and counseling regarding his condition or for health maintenance advice. Please see specific information pulled into the AVS if appropriate.       "

## 2024-08-26 ENCOUNTER — OFFICE VISIT (OUTPATIENT)
Dept: INTERNAL MEDICINE | Facility: CLINIC | Age: 24
End: 2024-08-26
Payer: COMMERCIAL

## 2024-08-26 VITALS
OXYGEN SATURATION: 96 % | HEIGHT: 69 IN | WEIGHT: 239 LBS | BODY MASS INDEX: 35.4 KG/M2 | DIASTOLIC BLOOD PRESSURE: 72 MMHG | HEART RATE: 84 BPM | SYSTOLIC BLOOD PRESSURE: 112 MMHG | TEMPERATURE: 96.4 F

## 2024-08-26 DIAGNOSIS — J45.909 ASTHMA, UNSPECIFIED ASTHMA SEVERITY, UNSPECIFIED WHETHER COMPLICATED, UNSPECIFIED WHETHER PERSISTENT: Primary | ICD-10-CM

## 2024-08-26 DIAGNOSIS — R56.9 SEIZURES: ICD-10-CM

## 2024-08-26 DIAGNOSIS — K21.00 GASTROESOPHAGEAL REFLUX DISEASE WITH ESOPHAGITIS WITHOUT HEMORRHAGE: ICD-10-CM

## 2024-08-26 PROCEDURE — 99214 OFFICE O/P EST MOD 30 MIN: CPT | Performed by: STUDENT IN AN ORGANIZED HEALTH CARE EDUCATION/TRAINING PROGRAM

## 2024-08-26 RX ORDER — FLUTICASONE PROPIONATE 50 MCG
SPRAY, SUSPENSION (ML) NASAL
COMMUNITY
Start: 2024-07-26

## 2024-08-26 NOTE — PROGRESS NOTES
"Chief Complaint  Establish Care and Asthma    Subjective        Brandon Marsh presents to [facility]  History of Present Illness  Mr. Marsh presents to clinic today as a new patient to establish care.  He was previously following with Dr. Ojeda    He has no specific acute complaints or concerns today.  He has a history of asthma and currently takes prn inhaler but does have maintenance inhaler that he doesn't use    Also has a history of GERD with esophagitis in which he takes a PPI with symptoms stable.    Follows with neurology for seizure disorder, on keppra.       Objective   Vital Signs:  /72   Pulse 84   Temp 96.4 °F (35.8 °C) (Temporal)   Ht 175.3 cm (69.02\")   Wt 108 kg (239 lb)   SpO2 96%   BMI 35.27 kg/m²   Estimated body mass index is 35.27 kg/m² as calculated from the following:    Height as of this encounter: 175.3 cm (69.02\").    Weight as of this encounter: 108 kg (239 lb).            Physical Exam  Vitals reviewed.   Constitutional:       General: He is not in acute distress.     Appearance: Normal appearance. He is not toxic-appearing.   HENT:      Head: Normocephalic and atraumatic.   Eyes:      General: No scleral icterus.     Conjunctiva/sclera: Conjunctivae normal.   Cardiovascular:      Rate and Rhythm: Normal rate and regular rhythm.      Heart sounds: Normal heart sounds.   Pulmonary:      Effort: Pulmonary effort is normal. No respiratory distress.      Breath sounds: Normal breath sounds. No wheezing.   Skin:     General: Skin is warm and dry.   Neurological:      Mental Status: He is alert and oriented to person, place, and time.   Psychiatric:         Mood and Affect: Mood normal.         Behavior: Behavior normal.        Result Review :                Assessment and Plan   Diagnoses and all orders for this visit:    1. Asthma, unspecified asthma severity, unspecified whether complicated, unspecified whether persistent (Primary)    2. " Gastroesophageal reflux disease with esophagitis without hemorrhage    3. Seizures      Clinical conditions are chronic and stable listed above.    Asthma is under control as of now, advised to use maintenance inhaler especially going into fall/winter.     GERD is chronic and stable, will continue prevacid.     RTC in 6mo or sooner prn for CPE            Follow Up   Return in about 6 months (around 2/26/2025) for Annual physical.  Patient was given instructions and counseling regarding his condition or for health maintenance advice. Please see specific information pulled into the AVS if appropriate.

## 2024-08-27 ENCOUNTER — PATIENT ROUNDING (BHMG ONLY) (OUTPATIENT)
Dept: INTERNAL MEDICINE | Facility: CLINIC | Age: 24
End: 2024-08-27
Payer: COMMERCIAL

## 2024-08-27 NOTE — PROGRESS NOTES
August 27, 2024    Hello, may I speak with Brandon Marsh?    My name is Fernandez      I am  with [facility]  [address]  [place] [facility] [address].    Before we get started may I verify your date of birth? 2000    I am calling to officially welcome you to our practice and ask about your recent visit. Is this a good time to talk? yes    Tell me about your visit with us. What things went well?  everything       We're always looking for ways to make our patients' experiences even better. Do you have recommendations on ways we may improve?  no    Overall were you satisfied with your first visit to our practice? yes       I appreciate you taking the time to speak with me today. Is there anything else I can do for you? no      Thank you, and have a great day.

## 2024-10-01 ENCOUNTER — TELEPHONE (OUTPATIENT)
Dept: NEUROLOGY | Facility: CLINIC | Age: 24
End: 2024-10-01

## 2024-10-01 NOTE — TELEPHONE ENCOUNTER
Caller: MONIQUE UREÑA       Best call back number: [phone]    What is your medical concern? SHE IS WONDERING WITH A DX OF SEIZURES IS IT SAFE TO RECEIVE A FLU SHOT? PLEASE REVIEW AND ADVISE.

## 2024-10-09 DIAGNOSIS — R56.9 SEIZURES: ICD-10-CM

## 2024-10-09 RX ORDER — LEVETIRACETAM 500 MG/1
1000 TABLET, FILM COATED, EXTENDED RELEASE ORAL 2 TIMES DAILY
Qty: 360 TABLET | Refills: 0 | Status: SHIPPED | OUTPATIENT
Start: 2024-10-09

## 2024-10-17 DIAGNOSIS — R56.9 SEIZURE: ICD-10-CM

## 2024-10-17 RX ORDER — LEVETIRACETAM 750 MG/1
750 TABLET ORAL 2 TIMES DAILY
Qty: 180 TABLET | Refills: 0 | Status: SHIPPED | OUTPATIENT
Start: 2024-10-17 | End: 2024-10-21 | Stop reason: SDUPTHER

## 2024-10-21 ENCOUNTER — OFFICE VISIT (OUTPATIENT)
Dept: NEUROLOGY | Facility: CLINIC | Age: 24
End: 2024-10-21
Payer: COMMERCIAL

## 2024-10-21 VITALS
SYSTOLIC BLOOD PRESSURE: 122 MMHG | BODY MASS INDEX: 35.1 KG/M2 | HEIGHT: 69 IN | HEART RATE: 96 BPM | WEIGHT: 237 LBS | OXYGEN SATURATION: 96 % | DIASTOLIC BLOOD PRESSURE: 84 MMHG

## 2024-10-21 DIAGNOSIS — R56.9 SEIZURES: Primary | ICD-10-CM

## 2024-10-21 PROCEDURE — 99214 OFFICE O/P EST MOD 30 MIN: CPT | Performed by: PSYCHIATRY & NEUROLOGY

## 2024-10-21 RX ORDER — LEVETIRACETAM 500 MG/1
1000 TABLET, FILM COATED, EXTENDED RELEASE ORAL 2 TIMES DAILY
Qty: 360 TABLET | Refills: 0 | Status: SHIPPED | OUTPATIENT
Start: 2024-10-21

## 2024-10-21 NOTE — ASSESSMENT & PLAN NOTE
"24 year old right handed man with seizures.  On 12/2/2023 he reportedly experienced seizure like activity close to midnight.  His mother heard a loud bang.  They found him face down seizing.  He was stiff and his legs were under his bed and his head was on the bottom shelf of entertainment [place]. His mother witnessed him seizing with generalized convulsions noted followed by shallow breathing.  He was at home in his room playing video games and the next thing he remembers is waking up on the floor with EMS around him.  He bit the left side of his tongue.  The tonic-clonic activity lasted for approximately 3-5 minutes at most but he was postictal for at least 20 minutes.  No urinary incontinence.  No recent fevers but he had some sinus infection the week prior.  No sleep deprivation.  No changes in medications.  Denies alcohol or drug use.  There is family history of seizures in patient's greater grandmother who was on phenobarbital.  His great aunt has a seizure disorder an is on zonisamide.  He also has a cousin with seizure disorder who is on oxcarbazepine.  He had febrile seizures as a child and his sister also had febrile seizures as child.  He has been on ADHD medication for impulsive behavior.  He was also having eye blinking which patient is not aware of doing.  He he may have also had rare jerking movements of his extremities or holds a cup and it may drop out of his hand.  He was treated with Keppra 1000 mg IV x 1 and continued on 500 every 12 hours.  CPK 19.  Head CT showed no acute intracranial process. Of note he had a brain MRI scan with and without contrast done on 12/2/2023 which does not demonstrate any acute intracranial abnormalities.  He has had a normal awake and sleep EEG.  He was doing well with the levetiracetam 750 mg BID but still has had some eye twitching spells and some \"dejavu\" type sensations where he felt slightly off at his last appointment so I increased his dose of the " levetiracetam XR 1000 mg BID which he reports tolerating well with no further seizures though he was diagnosed with simple tics by movement disorder specialist.  He has not had a definitive seizure since 12/2/2023 and doing well with current dose of levetiracetam which I will continue for him at this time.  Advised him to take the medication as prescribed without missing any doses as seizures can be potentially fatal and discussed risk for SUDEP.  Discussed seizure precautions again today which he is following.

## 2024-10-21 NOTE — PROGRESS NOTES
Chief Complaint  Seizures (Accompanied by Mom (Dianna) - No new sz)    Subjective          Brandon Marsh presents to [facility] for   HISTORY OF PRESENT ILLNESS:    Brandon Marsh is a 24 year old right handed man who returns to neurology clinic with his mother, Dianna, for follow up evaluation and treatment of seizures.  On 12/2/2023 he reportedly experienced seizure like activity close to midnight.  His mother heard a loud bang.  They found him face down seizing.  He was stiff and his legs were under his bed and his head was on the bottom shelf of [employer]. His mother witnessed him seizing with generalized convulsions noted followed by shallow breathing.  He was at home in his room playing video games and the next thing he remembers is waking up on the floor with EMS around him.  He bit the left side of his tongue.  The tonic-clonic activity lasted for approximately 3-5 minutes at most but he was postictal for at least 20 minutes.  No urinary incontinence.  No recent fevers but he had some sinus infection the week prior.  No sleep deprivation.  No changes in medications.  Denies alcohol or drug use.  There is family history of seizures in patient's greater grandmother who was on phenobarbital.  His great aunt has a seizure disorder an is on zonisamide.  He also has a cousin with seizure disorder who is on oxcarbazepine.  He had febrile seizures as a child and his sister also had febrile seizures as child.  He has been on ADHD medication for impulsive behavior.  He was also having eye blinking which patient is not aware of doing.  He he may have also had rare jerking movements of his extremities or holds a cup and it may drop out of his hand.  He was treated with Keppra 1000 mg IV x 1 and continued on 500 every 12 hours.  CPK 19.  Head CT showed no acute intracranial process. Of note he had a brain MRI scan with and without contrast done on 12/2/2023 which does not demonstrate any  "acute intracranial abnormalities.  He has had a normal awake and sleep EEG.  He was doing well with the levetiracetam 750 mg BID but still has had some eye twitching spells and some \"dejavu\" type sensations where he felt slightly off at his last appointment so I increased his dose of the levetiracetam XR 1000 mg BID which he reports tolerating well with no further seizures though he was diagnosed with simple tics by movement disorder specialist.     Past Medical History:   Diagnosis Date    ADHD (attention deficit hyperactivity disorder)     Not currently on medication    Allergic     Asthma     Clotting disorder     Alpha Thalassemia Trait    COVID-19 virus detected 12/22/2022    Gastric hemorrhage due to erosive gastritis 08/31/2022    GERD (gastroesophageal reflux disease)     Head injury 12/02/2023    Hit head with seizure    History of medical problems     Mild spinal bifida    Seasonal allergies     Seizures 12/02/2023    Tachycardia 03/01/2023        Family History   Problem Relation Age of Onset    Arthritis Mother     Cervical cancer Mother     Cancer Mother         Cervical Carcinoma    Migraines Mother     Hyperlipidemia Father     Arthritis Father     Depression Father     Anxiety disorder Father     Heart disease Father         Afib    Hypertension Father     Mental illness Father         Bipolar 1    Anxiety disorder Sister     Depression Sister     Miscarriages / Stillbirths Sister     Breast cancer Maternal Aunt     Asthma Maternal Aunt     Miscarriages / Stillbirths Maternal Aunt     Lung cancer Paternal Grandmother     Cancer Paternal Grandmother         Small Cell Lung Cancer, neuroendocrine tumor in stomach, Basal Cell Carcinoma    COPD Paternal Grandmother     Depression Paternal Grandmother     Heart disease Paternal Grandmother     Arthritis Paternal Grandmother     Vision loss Maternal Grandfather         Glaucoma    Arthritis Maternal Grandmother     Depression Maternal Grandmother     " "Migraines Maternal Grandmother     Miscarriages / Stillbirths Maternal Grandmother     Drug abuse Paternal Aunt     Seizures Paternal Aunt     Seizures Paternal Aunt         Social History     Socioeconomic History    Marital status: Single   Tobacco Use    Smoking status: Never     Passive exposure: Never    Smokeless tobacco: Never   Vaping Use    Vaping status: Never Used   Substance and Sexual Activity    Alcohol use: Never    Drug use: Never    Sexual activity: Never        I have reviewed and confirmed the accuracy of the ROS as documented by the MA/LPN/RN Wallace Escobedo MD   Review of Systems   Constitutional:  Negative for activity change and appetite change.   HENT:  Negative for trouble swallowing and voice change.    Eyes:  Negative for blurred vision and double vision.   Gastrointestinal:  Negative for nausea and vomiting.   Neurological:  Positive for seizures. Negative for dizziness, tremors, syncope, facial asymmetry, speech difficulty, weakness, light-headedness, numbness, headache, memory problem and confusion.   Psychiatric/Behavioral:  Positive for sleep disturbance. Negative for agitation, behavioral problems, decreased concentration, dysphoric mood, hallucinations, self-injury, suicidal ideas, negative for hyperactivity, depressed mood and stress. The patient is not nervous/anxious.         Objective   Vital Signs:   /84   Pulse 96   Ht 175.3 cm (69.02\")   Wt 108 kg (237 lb)   SpO2 96%   BMI 34.98 kg/m²       PHYSICAL EXAM:    General   Mental Status - Alert. General Appearance - Well developed, Well groomed, Oriented and Cooperative. Orientation - Oriented X3.       Head and Neck  Head - normocephalic, atraumatic with no lesions or palpable masses.  Neck    Global Assessment - supple.       Eye   Sclera/Conjunctiva - Bilateral - Normal.    ENMT  Mouth and Throat   Oral Cavity/Oropharynx: Oropharynx - the soft palate,uvula and tongue are normal in appearance.    Chest and Lung Exam "   Chest - lung clear to auscultation bilaterally.    Cardiovascular   Cardiovascular examination reveals  - normal heart sounds, regular rate and rhythm.    Neurologic   Mental Status: Speech - Normal. Cognitive function - appropriate fund of knowledge. No impairment of attention, Impairment of concentration, impairment of long term memory or impairment of short term memory.  Cranial Nerves:   II Optic: Visual acuity - Left - Normal. Right - Normal. Visual fields - Normal (to confrontation).  III Oculomotor: Pupillary constriction - Left - Normal. Right - Normal.  VII Facial: - Normal Bilaterally.   IX Glossopharyngeal / X Vagus - Normal.  XI Accessory: Trapezius - Bilateral - Normal. Sternocleidomastoid - Bilateral - Normal.  XII Hypoglossal - Bilateral - Normal.  Eye Movements: - Normal Bilaterally.  Sensory:   Light Touch: Intact - Globally.  Motor:   Bulk and Contour: - Normal.  Tone: - Normal.  Tremor: Not present.  Strength: 5/5 normal muscle strength - All Muscles.   General Assessment of Reflexes: - deep tendon reflexes are normal. Coordination - No Impairment of finger-to-nose or Impairment of rapid alternating movements. Gait - Normal.       Result Review :                 Assessment and Plan    Problem List Items Addressed This Visit       Seizures - Primary    Current Assessment & Plan     24 year old right handed man with seizures.  On 12/2/2023 he reportedly experienced seizure like activity close to midnight.  His mother heard a loud bang.  They found him face down seizing.  He was stiff and his legs were under his bed and his head was on the bottom shelf of entertainment center. His mother witnessed him seizing with generalized convulsions noted followed by shallow breathing.  He was at home in his room playing video games and the next thing he remembers is waking up on the floor with EMS around him.  He bit the left side of his tongue.  The tonic-clonic activity lasted for approximately 3-5 minutes  "at most but he was postictal for at least 20 minutes.  No urinary incontinence.  No recent fevers but he had some sinus infection the week prior.  No sleep deprivation.  No changes in medications.  Denies alcohol or drug use.  There is family history of seizures in patient's greater grandmother who was on phenobarbital.  His great aunt has a seizure disorder an is on zonisamide.  He also has a cousin with seizure disorder who is on oxcarbazepine.  He had febrile seizures as a child and his sister also had febrile seizures as child.  He has been on ADHD medication for impulsive behavior.  He was also having eye blinking which patient is not aware of doing.  He he may have also had rare jerking movements of his extremities or holds a cup and it may drop out of his hand.  He was treated with Keppra 1000 mg IV x 1 and continued on 500 every 12 hours.  CPK 19.  Head CT showed no acute intracranial process. Of note he had a brain MRI scan with and without contrast done on 12/2/2023 which does not demonstrate any acute intracranial abnormalities.  He has had a normal awake and sleep EEG.  He was doing well with the levetiracetam 750 mg BID but still has had some eye twitching spells and some \"dejavu\" type sensations where he felt slightly off at his last appointment so I increased his dose of the levetiracetam XR 1000 mg BID which he reports tolerating well with no further seizures though he was diagnosed with simple tics by movement disorder specialist.  He has not had a definitive seizure since 12/2/2023 and doing well with current dose of levetiracetam which I will continue for him at this time.  Advised him to take the medication as prescribed without missing any doses as seizures can be potentially fatal and discussed risk for SUDEP.  Discussed seizure precautions again today which he is following.          Relevant Medications    levETIRAcetam XR (KEPPRA XR) 500 MG tablet       Follow Up   Return in about 3 months " (around 1/21/2025).  Patient was given instructions and counseling regarding his condition or for health maintenance advice. Please see specific information pulled into the AVS if appropriate.

## 2024-10-21 NOTE — PATIENT INSTRUCTIONS
In general, we recommend using good judgement when you are doing certain activities and to avoid those activities that if you were to have a seizure, you could harm yourself or others. In the state of [place], it is the law that you cannot drive within 90 days of a seizure. We also recommend not standing over open flames, not getting on high ladders or the roof, not swimming or taking baths by yourself (showers are ok) and not operating heavy machinery or power tools.  [facility]  Wallace Escobedo MD  Neurology clinic  [phone]    With anti-seizure medications, you may initially notice side effects of fatigue, drowsiness, unsteadiness, and dizziness.  Other possible side effects include nausea, abdominal pain, headache, blurry or double vision, slurred speech and mood changes.  Generally, patients will noticed these symptoms when the medication is first started or with higher doses and will go away with time.    It is import to consistently take your medication every day.  Missing just one dose may put you at risk for a breakthrough seizure.  Consider using reminders on your phone or a pill box.    If you develop a rash, please call the neurology clinic immediately or notify another healthcare professional, as this may be potentially life-threatening.  If you are unable to reach a healthcare professional, go to the emergency room immediately for further evaluation.    If you develop thoughts of wanting to hurt yourself or others, please call the neurology clinic immediately to notify another healthcare professional.  If you are unable to reach a healthcare professional, go to the emergency room immediately for further evaluation.    It is the [place] state law that you cannot drive within 90 days of a seizure.    You should avoid certain activities that if you were to have a seizure, you could harm yourself or others. In general, it is recommended that you avoid operating heavy machinery or  power tools, swimming or taking baths by yourself (showers are ok), don't stand over open flames, don't get on high ladders or the roof.  I also recommend to avoid sleeping on your stomach.    For further information on epilepsy and resources available to patients and their families, please visit the [facility] at [email or web address] or call [phone].    **Check out the [facility]'s monthly Art Group Gathering.  They [facility] [address].  Call Jody Mcfarlane at [phone] or email her at [email or web address] for the dates of future gatherings.**      **If you have having memory problems, consider HOBSCOTCH (Home-Based Self-management and Cognitive Training Changes lives).  It is an 8 week self-management program for adults with epilepsy and memory problems.  The program is free at the [facility].  Contact Dianna Acevedo at [phone] or [email or web address].**

## 2025-01-20 ENCOUNTER — TELEMEDICINE (OUTPATIENT)
Dept: NEUROLOGY | Facility: CLINIC | Age: 25
End: 2025-01-20
Payer: COMMERCIAL

## 2025-01-20 DIAGNOSIS — R56.9 SEIZURES: Primary | ICD-10-CM

## 2025-01-20 PROCEDURE — 99214 OFFICE O/P EST MOD 30 MIN: CPT | Performed by: PSYCHIATRY & NEUROLOGY

## 2025-01-20 RX ORDER — LEVETIRACETAM 500 MG/1
1000 TABLET, FILM COATED, EXTENDED RELEASE ORAL 2 TIMES DAILY
Qty: 360 TABLET | Refills: 1 | Status: SHIPPED | OUTPATIENT
Start: 2025-01-20

## 2025-01-20 NOTE — ASSESSMENT & PLAN NOTE
"24 year old right handed man who is being evaluated via telehealth with video and audio for seizures.  On 12/2/2023 he reportedly experienced seizure like activity close to midnight.  His mother heard a loud bang.  They found him face down seizing.  He was stiff and his legs were under his bed and his head was on the bottom shelf of [employer]. His mother witnessed him seizing with generalized convulsions noted followed by shallow breathing.  He was at home in his room playing video games and the next thing he remembers is waking up on the floor with EMS around him.  He bit the left side of his tongue.  The tonic-clonic activity lasted for approximately 3-5 minutes at most but he was postictal for at least 20 minutes.  No urinary incontinence.  No recent fevers but he had some sinus infection the week prior.  No sleep deprivation.  No changes in medications.  Denies alcohol or drug use.  There is family history of seizures in patient's greater grandmother who was on phenobarbital.  His great aunt has a seizure disorder an is on zonisamide.  He also has a cousin with seizure disorder who is on oxcarbazepine.  He had febrile seizures as a child and his sister also had febrile seizures as child.  He had been on ADHD medication for impulsive behavior which has been discontinued.  He was also having eye blinking which patient is not aware of doing.  He he may have also had rare jerking movements of his extremities or holds a cup and it may drop out of his hand.  He was treated with Keppra 1000 mg IV x 1 and continued on 500 every 12 hours.  CPK 19.  Head CT showed no acute intracranial process. Of note he had a brain MRI scan with and without contrast done on 12/2/2023 which does not demonstrate any acute intracranial abnormalities.  He has had a normal awake and sleep EEG.  He was doing well with the levetiracetam 750 mg BID but still has had some eye twitching spells and some \"dejavu\" type sensations where he " felt slightly off at his last appointment so I increased his dose of the levetiracetam XR 1000 mg BID which he reports tolerating well with no further seizures though he was diagnosed with simple tics by movement disorder specialist.  He has not had any further seizures.  He is following seizure precautions which I discussed with him again today, he is not driving.  Reports tolerating the medicine well without any noticeable side effects.  Advised him to take his levetiracetam as prescribed without missing any doses which I will re-prescribe for him today.  Informed him to continue these as epileptic seizures can be potentially fatal and have discussed risk for SUDEP.  Will follow up in 6 months and sooner if needed.

## 2025-01-20 NOTE — PROGRESS NOTES
Chief Complaint  seizures    Subjective          Brandon Marsh presents to [facility] NEUROLOGY for   HISTORY OF PRESENT ILLNESS:    Patient is being evaluated via Telehealth utilizing both Video and Audio.      Brandon Marsh is a 24 year old right handed man who is being evaluated via telehealth with video and audio for follow up evaluation and treatment of seizures.  On 12/2/2023 he reportedly experienced seizure like activity close to midnight.  His mother heard a loud bang.  They found him face down seizing.  He was stiff and his legs were under his bed and his head was on the bottom shelf of [employer]. His mother witnessed him seizing with generalized convulsions noted followed by shallow breathing.  He was at home in his room playing video games and the next thing he remembers is waking up on the floor with EMS around him.  He bit the left side of his tongue.  The tonic-clonic activity lasted for approximately 3-5 minutes at most but he was postictal for at least 20 minutes.  No urinary incontinence.  No recent fevers but he had some sinus infection the week prior.  No sleep deprivation.  No changes in medications.  Denies alcohol or drug use.  There is family history of seizures in patient's greater grandmother who was on phenobarbital.  His great aunt has a seizure disorder an is on zonisamide.  He also has a cousin with seizure disorder who is on oxcarbazepine.  He had febrile seizures as a child and his sister also had febrile seizures as child.  He had been on ADHD medication for impulsive behavior which has been discontinued.  He was also having eye blinking which patient is not aware of doing.  He he may have also had rare jerking movements of his extremities or holds a cup and it may drop out of his hand.  He was treated with Keppra 1000 mg IV x 1 and continued on 500 every 12 hours.  CPK 19.  Head CT showed no acute intracranial process. Of note he had a brain MRI scan  "with and without contrast done on 12/2/2023 which does not demonstrate any acute intracranial abnormalities.  He has had a normal awake and sleep EEG.  He was doing well with the levetiracetam 750 mg BID but still has had some eye twitching spells and some \"dejavu\" type sensations where he felt slightly off at his last appointment so I increased his dose of the levetiracetam XR 1000 mg BID which he reports tolerating well with no further seizures though he was diagnosed with simple tics by movement disorder specialist.  He has not had any further seizures.  He is following seizure precautions.      Past Medical History:   Diagnosis Date    ADHD (attention deficit hyperactivity disorder)     Not currently on medication    Allergic     Asthma     Clotting disorder     Alpha Thalassemia Trait    COVID-19 virus detected 12/22/2022    Gastric hemorrhage due to erosive gastritis 08/31/2022    GERD (gastroesophageal reflux disease)     Head injury 12/02/2023    Hit head with seizure    History of medical problems     Mild spinal bifida    Seasonal allergies     Seizures 12/02/2023    Tachycardia 03/01/2023        Family History   Problem Relation Age of Onset    Arthritis Mother     Cervical cancer Mother     Cancer Mother         Cervical Carcinoma    Migraines Mother     Hyperlipidemia Father     Arthritis Father     Depression Father     Anxiety disorder Father     Heart disease Father         Afib    Hypertension Father     Mental illness Father         Bipolar 1    Anxiety disorder Sister     Depression Sister     Miscarriages / Stillbirths Sister     Breast cancer Maternal Aunt     Asthma Maternal Aunt     Miscarriages / Stillbirths Maternal Aunt     Lung cancer Paternal Grandmother     Cancer Paternal Grandmother         Small Cell Lung Cancer, neuroendocrine tumor in stomach, Basal Cell Carcinoma    COPD Paternal Grandmother     Depression Paternal Grandmother     Heart disease Paternal Grandmother     Arthritis " Paternal Grandmother     Vision loss Maternal Grandfather         Glaucoma    Arthritis Maternal Grandmother     Depression Maternal Grandmother     Migraines Maternal Grandmother     Miscarriages / Stillbirths Maternal Grandmother     Drug abuse Paternal Aunt     Seizures Paternal Aunt     Seizures Paternal Aunt         Social History     Socioeconomic History    Marital status: Single   Tobacco Use    Smoking status: Never     Passive exposure: Never    Smokeless tobacco: Never   Vaping Use    Vaping status: Never Used   Substance and Sexual Activity    Alcohol use: Never    Drug use: Never    Sexual activity: Never        I have personally reviewed the ROS as stated below.     Review of Systems     Constitutional:  Negative for activity change and appetite change.   HENT:  Negative for trouble swallowing and voice change.    Eyes:  Negative for blurred vision and double vision.   Gastrointestinal:  Negative for nausea and vomiting.   Neurological:  Negative for seizures. Negative for dizziness, tremors, syncope, facial asymmetry, speech difficulty, weakness, light-headedness, numbness, headache, memory problem and confusion.   Psychiatric/Behavioral:  Negative for sleep disturbance. Negative for agitation, behavioral problems, decreased concentration, dysphoric mood, hallucinations, self-injury, suicidal ideas, negative for hyperactivity, depressed mood and stress out of the ordinary. The patient is not nervous/anxious.         Objective   Vital Signs Not obtained as this is a Telehealth Video Visit    PHYSICAL EXAM:    General   Mental Status - Alert. General Appearance - Well developed, Well groomed, Oriented and Cooperative. Orientation - Oriented X3.       Head and Neck  Head - normocephalic, atraumatic with no lesions or palpable masses.  Neck    Global Assessment - supple.       Eye   Sclera/Conjunctiva - Bilateral - Normal.    ENMT  Mouth and Throat   Oral Cavity/Oropharynx: Oropharynx - the soft  palate,uvula and tongue are normal in appearance.    Neurologic   Mental Status: Speech - Normal. Cognitive function - appropriate fund of knowledge. No impairment of attention, Impairment of concentration, impairment of long term memory or impairment of short term memory.  Cranial Nerves:   II Optic: Visual acuity - Left - Normal. Right - Normal.   VII Facial: - Normal Bilaterally.  VIII Acoustic - Bilateral - Hearing normal and (Hearing tested by finger rub).   IX Glossopharyngeal / X Vagus - Normal.  XI Accessory: Trapezius - Bilateral - Normal. Sternocleidomastoid - Bilateral - Normal.  XII Hypoglossal - Bilateral - Normal.  Eye Movements: - Normal Bilaterally.  Sensory:   Light Touch: Intact - Globally.  Motor:   Bulk and Contour: - Normal.  Tone: - Normal.  Tremor: Not present.  Strength: 5/5 normal muscle strength - All Muscles.   General Assessment: - Coordination - No Impairment of finger-to-nose or Impairment of rapid alternating movements. Gait - Normal.       Result Review :                 Assessment and Plan    Problem List Items Addressed This Visit       Seizures - Primary    Current Assessment & Plan     24 year old right handed man who is being evaluated via telehealth with video and audio for seizures.  On 12/2/2023 he reportedly experienced seizure like activity close to midnight.  His mother heard a loud bang.  They found him face down seizing.  He was stiff and his legs were under his bed and his head was on the bottom shelf of entertainment center. His mother witnessed him seizing with generalized convulsions noted followed by shallow breathing.  He was at home in his room playing video games and the next thing he remembers is waking up on the floor with EMS around him.  He bit the left side of his tongue.  The tonic-clonic activity lasted for approximately 3-5 minutes at most but he was postictal for at least 20 minutes.  No urinary incontinence.  No recent fevers but he had some sinus  "infection the week prior.  No sleep deprivation.  No changes in medications.  Denies alcohol or drug use.  There is family history of seizures in patient's greater grandmother who was on phenobarbital.  His great aunt has a seizure disorder an is on zonisamide.  He also has a cousin with seizure disorder who is on oxcarbazepine.  He had febrile seizures as a child and his sister also had febrile seizures as child.  He had been on ADHD medication for impulsive behavior which has been discontinued.  He was also having eye blinking which patient is not aware of doing.  He he may have also had rare jerking movements of his extremities or holds a cup and it may drop out of his hand.  He was treated with Keppra 1000 mg IV x 1 and continued on 500 every 12 hours.  CPK 19.  Head CT showed no acute intracranial process. Of note he had a brain MRI scan with and without contrast done on 12/2/2023 which does not demonstrate any acute intracranial abnormalities.  He has had a normal awake and sleep EEG.  He was doing well with the levetiracetam 750 mg BID but still has had some eye twitching spells and some \"dejavu\" type sensations where he felt slightly off at his last appointment so I increased his dose of the levetiracetam XR 1000 mg BID which he reports tolerating well with no further seizures though he was diagnosed with simple tics by movement disorder specialist.  He has not had any further seizures.  He is following seizure precautions which I discussed with him again today, he is not driving.  Reports tolerating the medicine well without any noticeable side effects.  Advised him to take his levetiracetam as prescribed without missing any doses which I will re-prescribe for him today.  Informed him to continue these as epileptic seizures can be potentially fatal and have discussed risk for SUDEP.  Will follow up in 6 months and sooner if needed.           Relevant Medications    levETIRAcetam XR (KEPPRA XR) 500 MG tablet "       Patient provided consent for Telehealth visit.      This was an audio and video enabled telemedicine encounter.     I performed this clinical encounter via real-time telehealth video connection originating from the patient's location at home in [place] and my location at my home in [place], [place]. Verbal consent to participate in this telehealth video clinical visit was obtained and any questions by the patient regarding the interaction were answered.     Follow Up   Return in about 6 months (around 7/20/2025).  Patient was given instructions and counseling regarding his condition or for health maintenance advice. Please see specific information pulled into the AVS if appropriate.

## 2025-01-20 NOTE — PATIENT INSTRUCTIONS
In general, we recommend using good judgement when you are doing certain activities and to avoid those activities that if you were to have a seizure, you could harm yourself or others. In the state of [place], it is the law that you cannot drive within 90 days of a seizure. We also recommend not standing over open flames, not getting on high ladders or the roof, not swimming or taking baths by yourself (showers are ok) and not operating heavy machinery or power tools.  [facility]  Wallace Escobedo MD  Neurology clinic  [phone]    With anti-seizure medications, you may initially notice side effects of fatigue, drowsiness, unsteadiness, and dizziness.  Other possible side effects include nausea, abdominal pain, headache, blurry or double vision, slurred speech and mood changes.  Generally, patients will noticed these symptoms when the medication is first started or with higher doses and will go away with time.    It is import to consistently take your medication every day.  Missing just one dose may put you at risk for a breakthrough seizure.  Consider using reminders on your phone or a pill box.    If you develop a rash, please call the neurology clinic immediately or notify another healthcare professional, as this may be potentially life-threatening.  If you are unable to reach a healthcare professional, go to the emergency room immediately for further evaluation.    If you develop thoughts of wanting to hurt yourself or others, please call the neurology clinic immediately to notify another healthcare professional.  If you are unable to reach a healthcare professional, go to the emergency room immediately for further evaluation.    It is the [place] state law that you cannot drive within 90 days of a seizure.    You should avoid certain activities that if you were to have a seizure, you could harm yourself or others. In general, it is recommended that you avoid operating heavy machinery or  power tools, swimming or taking baths by yourself (showers are ok), don't stand over open flames, don't get on high ladders or the roof.  I also recommend to avoid sleeping on your stomach.    For further information on epilepsy and resources available to patients and their families, please visit the Epilepsy [facility] at [email or web address] or call [phone].    **Check out the Epilepsy [facility]'s monthly Art Group Gathering.  [facility] [address].  Call Jody Mcfarlane at [phone] or email her at [email or web address] for the dates of future gatherings.**      **If you have having memory problems, consider HOBSCOTCH (Home-Based Self-management and Cognitive Training Changes lives).  It is an 8 week self-management program for adults with epilepsy and memory problems.  The program is free at the Epilepsy [facility].  Contact Dianna Acevedo at [phone] or [email or web address].**

## 2025-02-25 ENCOUNTER — TELEPHONE (OUTPATIENT)
Dept: INTERNAL MEDICINE | Facility: CLINIC | Age: 25
End: 2025-02-25
Payer: COMMERCIAL

## 2025-02-25 DIAGNOSIS — Z00.00 WELLNESS EXAMINATION: Primary | ICD-10-CM

## 2025-02-25 NOTE — TELEPHONE ENCOUNTER
Caller: MONIQUE UREÑA    Relationship: Mother    Best call back number: [phone]     What orders are you requesting (i.e. lab or imaging): LABS    In what timeframe would the patient need to come in: AS SOON AS POSSIBLE    Where will you receive your lab/imaging services: IN OFFICE    Additional notes: PATIENT IS NEEDING ROUTINE LABS FOR PHYSICAL. PLEASE CALL TO SCHEDULE LAB APPOINTMENT ONCE ORDERS ARE POSTED

## 2025-02-25 NOTE — TELEPHONE ENCOUNTER
Spoke to mom they will go to the [facility] at the [facility] and have them drawn Thursday

## 2025-02-27 ENCOUNTER — LAB (OUTPATIENT)
Facility: HOSPITAL | Age: 25
End: 2025-02-27
Payer: COMMERCIAL

## 2025-02-27 LAB
ALBUMIN SERPL-MCNC: 4.1 G/DL (ref 3.5–5.2)
ALBUMIN/GLOB SERPL: 1.2 G/DL
ALP SERPL-CCNC: 86 U/L (ref 39–117)
ALT SERPL W P-5'-P-CCNC: 16 U/L (ref 1–41)
ANION GAP SERPL CALCULATED.3IONS-SCNC: 8.9 MMOL/L (ref 5–15)
AST SERPL-CCNC: 23 U/L (ref 1–40)
BASOPHILS # BLD AUTO: 0.08 10*3/MM3 (ref 0–0.2)
BASOPHILS NFR BLD AUTO: 1.2 % (ref 0–1.5)
BILIRUB SERPL-MCNC: 0.5 MG/DL (ref 0–1.2)
BUN SERPL-MCNC: 12 MG/DL (ref 6–20)
BUN/CREAT SERPL: 11.9 (ref 7–25)
CALCIUM SPEC-SCNC: 9.5 MG/DL (ref 8.6–10.5)
CHLORIDE SERPL-SCNC: 105 MMOL/L (ref 98–107)
CHOLEST SERPL-MCNC: 143 MG/DL (ref 0–200)
CO2 SERPL-SCNC: 24.1 MMOL/L (ref 22–29)
CREAT SERPL-MCNC: 1.01 MG/DL (ref 0.76–1.27)
DEPRECATED RDW RBC AUTO: 37.6 FL (ref 37–54)
EGFRCR SERPLBLD CKD-EPI 2021: 105.2 ML/MIN/1.73
EOSINOPHIL # BLD AUTO: 0.58 10*3/MM3 (ref 0–0.4)
EOSINOPHIL NFR BLD AUTO: 8.6 % (ref 0.3–6.2)
ERYTHROCYTE [DISTWIDTH] IN BLOOD BY AUTOMATED COUNT: 13.1 % (ref 12.3–15.4)
GLOBULIN UR ELPH-MCNC: 3.4 GM/DL
GLUCOSE SERPL-MCNC: 90 MG/DL (ref 65–99)
HBA1C MFR BLD: 5.7 % (ref 4.8–5.6)
HCT VFR BLD AUTO: 48.7 % (ref 37.5–51)
HDLC SERPL QL: 4.21
HDLC SERPL-MCNC: 34 MG/DL (ref 40–60)
HGB BLD-MCNC: 15.4 G/DL (ref 13–17.7)
IMM GRANULOCYTES # BLD AUTO: 0.02 10*3/MM3 (ref 0–0.05)
IMM GRANULOCYTES NFR BLD AUTO: 0.3 % (ref 0–0.5)
LDLC SERPL CALC-MCNC: 96 MG/DL (ref 0–100)
LYMPHOCYTES # BLD AUTO: 1.98 10*3/MM3 (ref 0.7–3.1)
LYMPHOCYTES NFR BLD AUTO: 29.4 % (ref 19.6–45.3)
MCH RBC QN AUTO: 25.5 PG (ref 26.6–33)
MCHC RBC AUTO-ENTMCNC: 31.6 G/DL (ref 31.5–35.7)
MCV RBC AUTO: 80.5 FL (ref 79–97)
MONOCYTES # BLD AUTO: 0.74 10*3/MM3 (ref 0.1–0.9)
MONOCYTES NFR BLD AUTO: 11 % (ref 5–12)
NEUTROPHILS NFR BLD AUTO: 3.33 10*3/MM3 (ref 1.7–7)
NEUTROPHILS NFR BLD AUTO: 49.5 % (ref 42.7–76)
NRBC BLD AUTO-RTO: 0 /100 WBC (ref 0–0.2)
PLATELET # BLD AUTO: 263 10*3/MM3 (ref 140–450)
PMV BLD AUTO: 11.7 FL (ref 6–12)
POTASSIUM SERPL-SCNC: 4.3 MMOL/L (ref 3.5–5.2)
PROT SERPL-MCNC: 7.5 G/DL (ref 6–8.5)
RBC # BLD AUTO: 6.05 10*6/MM3 (ref 4.14–5.8)
SODIUM SERPL-SCNC: 138 MMOL/L (ref 136–145)
TRIGL SERPL-MCNC: 62 MG/DL (ref 0–150)
VLDLC SERPL-MCNC: 13 MG/DL (ref 5–40)
WBC NRBC COR # BLD AUTO: 6.73 10*3/MM3 (ref 3.4–10.8)

## 2025-02-27 PROCEDURE — 85025 COMPLETE CBC W/AUTO DIFF WBC: CPT | Performed by: STUDENT IN AN ORGANIZED HEALTH CARE EDUCATION/TRAINING PROGRAM

## 2025-02-27 PROCEDURE — 83036 HEMOGLOBIN GLYCOSYLATED A1C: CPT | Performed by: STUDENT IN AN ORGANIZED HEALTH CARE EDUCATION/TRAINING PROGRAM

## 2025-02-27 PROCEDURE — 80061 LIPID PANEL: CPT | Performed by: STUDENT IN AN ORGANIZED HEALTH CARE EDUCATION/TRAINING PROGRAM

## 2025-02-27 PROCEDURE — 80053 COMPREHEN METABOLIC PANEL: CPT | Performed by: STUDENT IN AN ORGANIZED HEALTH CARE EDUCATION/TRAINING PROGRAM

## 2025-02-27 PROCEDURE — 36415 COLL VENOUS BLD VENIPUNCTURE: CPT | Performed by: STUDENT IN AN ORGANIZED HEALTH CARE EDUCATION/TRAINING PROGRAM

## 2025-02-28 NOTE — PROGRESS NOTES
Labs all look stable besides he has elevated blood sugar which puts him at risk of developing diabetes in future. Recommend increasing aerobic exercise and heart healthy diet    Will discuss at upcoming OV if any questions/concerns

## 2025-03-03 ENCOUNTER — OFFICE VISIT (OUTPATIENT)
Dept: INTERNAL MEDICINE | Facility: CLINIC | Age: 25
End: 2025-03-03
Payer: COMMERCIAL

## 2025-03-03 VITALS
HEIGHT: 69 IN | SYSTOLIC BLOOD PRESSURE: 122 MMHG | BODY MASS INDEX: 35.46 KG/M2 | OXYGEN SATURATION: 98 % | WEIGHT: 239.4 LBS | HEART RATE: 82 BPM | DIASTOLIC BLOOD PRESSURE: 70 MMHG

## 2025-03-03 DIAGNOSIS — R73.03 PRE-DIABETES: ICD-10-CM

## 2025-03-03 DIAGNOSIS — K21.00 GASTROESOPHAGEAL REFLUX DISEASE WITH ESOPHAGITIS WITHOUT HEMORRHAGE: ICD-10-CM

## 2025-03-03 DIAGNOSIS — Z00.00 ANNUAL PHYSICAL EXAM: Primary | ICD-10-CM

## 2025-03-03 DIAGNOSIS — J45.909 ASTHMA, UNSPECIFIED ASTHMA SEVERITY, UNSPECIFIED WHETHER COMPLICATED, UNSPECIFIED WHETHER PERSISTENT: ICD-10-CM

## 2025-03-03 PROCEDURE — 99395 PREV VISIT EST AGE 18-39: CPT | Performed by: STUDENT IN AN ORGANIZED HEALTH CARE EDUCATION/TRAINING PROGRAM

## 2025-03-03 PROCEDURE — 1159F MED LIST DOCD IN RCRD: CPT | Performed by: STUDENT IN AN ORGANIZED HEALTH CARE EDUCATION/TRAINING PROGRAM

## 2025-03-03 PROCEDURE — 1126F AMNT PAIN NOTED NONE PRSNT: CPT | Performed by: STUDENT IN AN ORGANIZED HEALTH CARE EDUCATION/TRAINING PROGRAM

## 2025-03-03 PROCEDURE — 2014F MENTAL STATUS ASSESS: CPT | Performed by: STUDENT IN AN ORGANIZED HEALTH CARE EDUCATION/TRAINING PROGRAM

## 2025-03-03 PROCEDURE — 1160F RVW MEDS BY RX/DR IN RCRD: CPT | Performed by: STUDENT IN AN ORGANIZED HEALTH CARE EDUCATION/TRAINING PROGRAM

## 2025-03-03 NOTE — PROGRESS NOTES
"Chief Complaint  Annual Exam    Subjective        Brandon Marsh presents to [facility] PRIMARY CARE  History of Present Illness    Presents clinic today for annual physical    He reports he is doing well, has no specific acute complaints or concerns.  He continues to follow with neurology for seizure disorder and is doing well on Keppra 1000 mg twice daily.    He did recently have the flu and has lingering postviral cough but overall symptoms are relatively mild    Review of Systems   Constitutional:  Negative for fatigue, fever and unexpected weight change.   HENT:  Negative for congestion and rhinorrhea.    Eyes:  Negative for photophobia and visual disturbance.   Respiratory:  Positive for cough. Negative for chest tightness and shortness of breath.    Cardiovascular:  Negative for chest pain and palpitations.   Gastrointestinal:  Negative for abdominal pain, diarrhea and vomiting.   Endocrine: Negative for polydipsia and polyuria.   Genitourinary:  Negative for difficulty urinating, frequency and hematuria.   Musculoskeletal:  Negative for arthralgias, gait problem and joint swelling.   Skin:  Negative for color change and rash.   Neurological:  Negative for seizures, syncope and headaches.   Psychiatric/Behavioral:  Negative for self-injury and suicidal ideas. The patient is not nervous/anxious.          Objective   Vital Signs:  /70   Pulse 82   Ht 175.3 cm (69.02\")   Wt 109 kg (239 lb 6.4 oz)   SpO2 98%   BMI 35.33 kg/m²   Estimated body mass index is 35.33 kg/m² as calculated from the following:    Height as of this encounter: 175.3 cm (69.02\").    Weight as of this encounter: 109 kg (239 lb 6.4 oz).            Physical Exam  Vitals reviewed.   Constitutional:       General: He is not in acute distress.     Appearance: Normal appearance. He is obese.   HENT:      Head: Normocephalic and atraumatic.      Right Ear: External ear normal.      Left Ear: External ear normal.      " Nose: Nose normal. No rhinorrhea.   Eyes:      General:         Right eye: No discharge.         Left eye: No discharge.      Extraocular Movements: Extraocular movements intact.      Conjunctiva/sclera: Conjunctivae normal.   Cardiovascular:      Rate and Rhythm: Normal rate and regular rhythm.      Heart sounds: Normal heart sounds.   Pulmonary:      Effort: Pulmonary effort is normal. No respiratory distress.      Breath sounds: Normal breath sounds.   Abdominal:      General: Abdomen is flat. There is no distension.      Palpations: Abdomen is soft.   Musculoskeletal:         General: No swelling or deformity. Normal range of motion.      Cervical back: Normal range of motion and neck supple.   Skin:     General: Skin is warm and dry.   Neurological:      General: No focal deficit present.      Mental Status: He is alert and oriented to person, place, and time. Mental status is at baseline.   Psychiatric:         Mood and Affect: Mood normal.         Behavior: Behavior normal.        Result Review :                Assessment and Plan   Diagnoses and all orders for this visit:    1. Annual physical exam (Primary)  -     Comprehensive Metabolic Panel; Future  -     Hemoglobin A1c; Future  -     Lipid Panel With / Chol / HDL Ratio; Future  -     CBC Auto Differential; Future    2. Pre-diabetes    3. Asthma, unspecified asthma severity, unspecified whether complicated, unspecified whether persistent    4. Gastroesophageal reflux disease with esophagitis without hemorrhage      Immunizations: recently had influenza, no indication for flu vaccine given recent dx and nearing end of season. Discussed need for MMR booster given outbreak in state, at this point no need for booster but will update if necessary  Cancer screening: N/A due to age  Metabolic Labs: UTD  Recommend maintaining a healthy lifestyle, rich in whole grains, fruits and vegetables. Limit high saturated fats and processed sugars. Maintain an active  lifestyle with goal of 150 min of moderate aerobic exercise per week      Discussed pre-diabetes and lifestyle changes to prevent from worsening including dietary modifications, exercise regularly    RTC in 1y for CPE or sooner prn, labs prior        Follow Up   Return in about 1 year (around 3/3/2026) for Annual physical.  Patient was given instructions and counseling regarding his condition or for health maintenance advice. Please see specific information pulled into the AVS if appropriate.

## 2025-04-29 DIAGNOSIS — J45.909 ASTHMA, UNSPECIFIED ASTHMA SEVERITY, UNSPECIFIED WHETHER COMPLICATED, UNSPECIFIED WHETHER PERSISTENT: ICD-10-CM

## 2025-04-29 RX ORDER — CETIRIZINE HYDROCHLORIDE 10 MG/1
10 TABLET ORAL DAILY
Qty: 90 TABLET | Refills: 3 | Status: SHIPPED | OUTPATIENT
Start: 2025-04-29

## 2025-04-29 RX ORDER — CETIRIZINE HYDROCHLORIDE 10 MG/1
10 TABLET ORAL DAILY
Qty: 90 TABLET | Refills: 3 | Status: SHIPPED | OUTPATIENT
Start: 2025-04-29 | End: 2025-04-29 | Stop reason: SDUPTHER

## 2025-07-21 ENCOUNTER — OFFICE VISIT (OUTPATIENT)
Dept: NEUROLOGY | Facility: CLINIC | Age: 25
End: 2025-07-21
Payer: COMMERCIAL

## 2025-07-21 VITALS
HEIGHT: 69 IN | BODY MASS INDEX: 35.7 KG/M2 | OXYGEN SATURATION: 97 % | HEART RATE: 87 BPM | DIASTOLIC BLOOD PRESSURE: 82 MMHG | SYSTOLIC BLOOD PRESSURE: 130 MMHG | WEIGHT: 241 LBS

## 2025-07-21 DIAGNOSIS — R56.9 SEIZURES: Primary | ICD-10-CM

## 2025-07-21 PROCEDURE — 1160F RVW MEDS BY RX/DR IN RCRD: CPT | Performed by: PSYCHIATRY & NEUROLOGY

## 2025-07-21 PROCEDURE — 99214 OFFICE O/P EST MOD 30 MIN: CPT | Performed by: PSYCHIATRY & NEUROLOGY

## 2025-07-21 PROCEDURE — 1159F MED LIST DOCD IN RCRD: CPT | Performed by: PSYCHIATRY & NEUROLOGY

## 2025-07-21 RX ORDER — LEVETIRACETAM 500 MG/1
1000 TABLET, FILM COATED, EXTENDED RELEASE ORAL 2 TIMES DAILY
Qty: 360 TABLET | Refills: 1 | Status: SHIPPED | OUTPATIENT
Start: 2025-07-21

## 2025-07-21 NOTE — PATIENT INSTRUCTIONS
In general, we recommend using good judgement when you are doing certain activities and to avoid those activities that if you were to have a seizure, you could harm yourself or others. In the state of [place], it is the law that you cannot drive within 90 days of a seizure. We also recommend not standing over open flames, not getting on high ladders or the roof, not swimming or taking baths by yourself (showers are ok) and not operating heavy machinery or power tools.  [facility]  Wallace Escobedo MD  Neurology clinic  [phone]    With anti-seizure medications, you may initially notice side effects of fatigue, drowsiness, unsteadiness, and dizziness.  Other possible side effects include nausea, abdominal pain, headache, blurry or double vision, slurred speech and mood changes.  Generally, patients will noticed these symptoms when the medication is first started or with higher doses and will go away with time.    It is import to consistently take your medication every day.  Missing just one dose may put you at risk for a breakthrough seizure.  Consider using reminders on your phone or a pill box.    If you develop a rash, please call the neurology clinic immediately or notify another healthcare professional, as this may be potentially life-threatening.  If you are unable to reach a healthcare professional, go to the emergency room immediately for further evaluation.    If you develop thoughts of wanting to hurt yourself or others, please call the neurology clinic immediately to notify another healthcare professional.  If you are unable to reach a healthcare professional, go to the emergency room immediately for further evaluation.    It is the [place] state law that you cannot drive within 90 days of a seizure.    You should avoid certain activities that if you were to have a seizure, you could harm yourself or others. In general, it is recommended that you avoid operating heavy machinery or  power tools, swimming or taking baths by yourself (showers are ok), don't stand over open flames, don't get on high ladders or the roof.  I also recommend to avoid sleeping on your stomach.    For further information on epilepsy and resources available to patients and their families, please visit the Epilepsy [facility] at [email or web address] or call [phone].    **Check out the [facility]psy [facility]'s monthly Art Group Gathering.  They are [facility] at [facility] [address].  Call Jody Mcfarlane at [phone] or email her at [email or web address] for the dates of future gatherings.**      **If you have having memory problems, consider HOBSCOTCH (Home-Based Self-management and Cognitive Training Changes lives).  It is an 8 week self-management program for adults with epilepsy and memory problems.  The program is free at the Epilepsy [facility].  Contact Dianna Acevedo at [phone] or [email or web address].**

## 2025-07-21 NOTE — PROGRESS NOTES
Chief Complaint  Seizures    Subjective          Brandon Marsh presents to [facility] NEUROLOGY for   HISTORY OF PRESENT ILLNESS:    Brandon Marsh is a 24 year old right handed man who returns to neurology clinic with his mother, Dianna, for follow up evaluation and treatment of seizures.  On 12/2/2023 he reportedly experienced seizure like activity close to midnight.  His mother heard a loud bang.  They found him face down seizing.  He was stiff and his legs were under his bed and his head was on the bottom shelf of entertainment center. His mother witnessed him seizing with generalized convulsions noted followed by shallow breathing.  He was at home in his room playing video games and the next thing he remembers is waking up on the floor with EMS around him.  He bit the left side of his tongue.  The tonic-clonic activity lasted for approximately 3-5 minutes at most but he was postictal for at least 20 minutes.  No urinary incontinence.  No recent fevers but he had some sinus infection the week prior.  No sleep deprivation.  No changes in medications.  Denies alcohol or drug use.  There is family history of seizures in patient's greater grandmother who was on phenobarbital.  His great aunt has a seizure disorder an is on zonisamide.  He also has a cousin with seizure disorder who is on oxcarbazepine.  He had febrile seizures as a child and his sister also had febrile seizures as child.  He had been on ADHD medication for impulsive behavior which has been discontinued.  He was also having eye blinking which patient is not aware of doing.  He he may have also had rare jerking movements of his extremities or holds a cup and it may drop out of his hand.  He was treated with Keppra 1000 mg IV x 1 and continued on 500 every 12 hours.  CPK 19.  Head CT showed no acute intracranial process. Of note he had a brain MRI scan with and without contrast done on 12/2/2023 which does not demonstrate any acute  "intracranial abnormalities.  He has had a normal awake and sleep EEG.  He was doing well with the levetiracetam 750 mg BID but still has had some eye twitching spells and some \"dejavu\" type sensations where he felt slightly off so I increased his dose of the levetiracetam XR 1000 mg BID which he reports tolerating well with no further seizures though he was diagnosed with simple tics by movement disorder specialist.  He has not had any further seizures since 12/2023.  He is following seizure precautions.  He had lab work including CMP and CBC in 2/2025 which I reviewed today.      ZAC reviewed by Wallace Escobedo MD     Past Medical History:   Diagnosis Date    ADHD (attention deficit hyperactivity disorder)     Not currently on medication    Allergic     Asthma     Clotting disorder     Alpha Thalassemia Trait    COVID-19 virus detected 12/22/2022    Gastric hemorrhage due to erosive gastritis 08/31/2022    GERD (gastroesophageal reflux disease)     Head injury 12/02/2023    Hit head with seizure    History of medical problems     Mild spinal bifida    Seasonal allergies     Seizures 12/02/2023    Tachycardia 03/01/2023        Family History   Problem Relation Age of Onset    Arthritis Mother     Cervical cancer Mother     Cancer Mother         Cervical Carcinoma    Migraines Mother     Hyperlipidemia Father     Arthritis Father     Depression Father     Anxiety disorder Father     Heart disease Father         Afib    Hypertension Father     Mental illness Father         Bipolar 1    Anxiety disorder Sister     Depression Sister     Miscarriages / Stillbirths Sister     Breast cancer Maternal Aunt     Asthma Maternal Aunt     Miscarriages / Stillbirths Maternal Aunt     Lung cancer Paternal Grandmother     Cancer Paternal Grandmother         Small Cell Lung Cancer, neuroendocrine tumor in stomach, Basal Cell Carcinoma    COPD Paternal Grandmother     Depression Paternal Grandmother     Heart disease Paternal " "Grandmother     Arthritis Paternal Grandmother     Vision loss Maternal Grandfather         Glaucoma    Arthritis Maternal Grandmother     Depression Maternal Grandmother     Migraines Maternal Grandmother     Miscarriages / Stillbirths Maternal Grandmother     Drug abuse Paternal Aunt     Seizures Paternal Aunt     Seizures Paternal Aunt         Social History     Socioeconomic History    Marital status: Single   Tobacco Use    Smoking status: Never     Passive exposure: Never    Smokeless tobacco: Never   Vaping Use    Vaping status: Never Used   Substance and Sexual Activity    Alcohol use: Never    Drug use: Never    Sexual activity: Never        I have reviewed and confirmed the accuracy of the ROS as documented by the MA/LPN/RN Wallace Escobedo MD   Review of Systems   Neurological:  Negative for dizziness, tremors, seizures, syncope, facial asymmetry, speech difficulty, weakness, light-headedness, numbness, headache, memory problem and confusion.        Objective   Vital Signs:   /82   Pulse 87   Ht 175.3 cm (69.02\")   Wt 109 kg (241 lb)   SpO2 97%   BMI 35.57 kg/m²       PHYSICAL EXAM:    General   Mental Status - Alert. General Appearance - Well developed, Well groomed, Oriented and Cooperative. Orientation - Oriented X3.       Head and Neck  Head - normocephalic, atraumatic with no lesions or palpable masses.  Neck    Global Assessment - supple.       Eye   Sclera/Conjunctiva - Bilateral - Normal.    ENMT  Mouth and Throat   Oral Cavity/Oropharynx: Oropharynx - the soft palate,uvula and tongue are normal in appearance.    Chest and Lung Exam   Chest - lung clear to auscultation bilaterally.    Cardiovascular   Cardiovascular examination reveals  - normal heart sounds, regular rate and rhythm.    Neurologic   Mental Status: Speech - Normal. Cognitive function - appropriate fund of knowledge. No impairment of attention, Impairment of concentration, impairment of long term memory or impairment of " short term memory.  Cranial Nerves:   II Optic: Visual acuity - Left - Normal. Right - Normal. Visual fields - Normal (to confrontation).  III Oculomotor: Pupillary constriction - Left - Normal. Right - Normal.  VII Facial: - Normal Bilaterally.   IX Glossopharyngeal / X Vagus - Normal.  XI Accessory: Trapezius - Bilateral - Normal. Sternocleidomastoid - Bilateral - Normal.  XII Hypoglossal - Bilateral - Normal.  Eye Movements: - Normal Bilaterally.  Sensory:   Light Touch: Intact - Globally.  Motor:   Bulk and Contour: - Normal.  Tone: - Normal.  Tremor: Not present.  Strength: 5/5 normal muscle strength - All Muscles.   General Assessment of Reflexes: - deep tendon reflexes are normal. Coordination - No Impairment of finger-to-nose or Impairment of rapid alternating movements. Gait - Normal.       Result Review :                 Assessment and Plan    Problem List Items Addressed This Visit       Seizures - Primary    Current Assessment & Plan   24 year old right handed man with seizures.  On 12/2/2023 he reportedly experienced seizure like activity close to midnight.  His mother heard a loud bang.  They found him face down seizing.  He was stiff and his legs were under his bed and his head was on the bottom shelf of [employer]. His mother witnessed him seizing with generalized convulsions noted followed by shallow breathing.  He was at home in his room playing video games and the next thing he remembers is waking up on the floor with EMS around him.  He bit the left side of his tongue.  The tonic-clonic activity lasted for approximately 3-5 minutes at most but he was postictal for at least 20 minutes.  No urinary incontinence.  No recent fevers but he had some sinus infection the week prior.  No sleep deprivation.  No changes in medications.  Denies alcohol or drug use.  There is family history of seizures in patient's greater grandmother who was on phenobarbital.  His great aunt has a seizure disorder  "an is on zonisamide.  He also has a cousin with seizure disorder who is on oxcarbazepine.  He had febrile seizures as a child and his sister also had febrile seizures as child.  He had been on ADHD medication for impulsive behavior which has been discontinued.  He was also having eye blinking which patient is not aware of doing.  He he may have also had rare jerking movements of his extremities or holds a cup and it may drop out of his hand.  He was treated with Keppra 1000 mg IV x 1 and continued on 500 every 12 hours.  CPK 19.  Head CT showed no acute intracranial process. Of note he had a brain MRI scan with and without contrast done on 12/2/2023 which does not demonstrate any acute intracranial abnormalities.  He has had a normal awake and sleep EEG.  He was doing well with the levetiracetam 750 mg BID but still has had some eye twitching spells and some \"dejavu\" type sensations where he felt slightly off so I increased his dose of the levetiracetam XR 1000 mg BID which he reports tolerating well with no further seizures though he was diagnosed with simple tics by movement disorder specialist.  He has not had any further seizures since 12/2023.  He is following seizure precautions.  He had lab work including CMP and CBC in 2/2025 which I reviewed today.  I spoke with them again today with regards to potentially doing a more prolonged EMU evaluation and taking him off of his levetiracetam to see if he continues to be at increased risk for seizures and to further classify possibly underlying epilepsy.  They are not interested in pursuing further testing at this time and would like to continue the current dose of levetiracetam XR 1000 mg BID as he has remained seizure free.  I spoke with them about having an acute rescue medication and will prescribe him Nayzilam for acute treatment of seizure lasting greater than 2-3 minutes or seizure cluster.  Discussed seizure precautions again and advised him to take his " medication without missing any doses as seizures can be potentially fatal.           Relevant Medications    levETIRAcetam XR (KEPPRA XR) 500 MG tablet    Midazolam 5 MG/0.1ML solution       Follow Up   Return in about 6 months (around 1/21/2026).  Patient was given instructions and counseling regarding his condition or for health maintenance advice. Please see specific information pulled into the AVS if appropriate.

## 2025-07-21 NOTE — ASSESSMENT & PLAN NOTE
"24 year old right handed man with seizures.  On 12/2/2023 he reportedly experienced seizure like activity close to midnight.  His mother heard a loud bang.  They found him face down seizing.  He was stiff and his legs were under his bed and his head was on the bottom shelf of entertainment center. His mother witnessed him seizing with generalized convulsions noted followed by shallow breathing.  He was at home in his room playing video games and the next thing he remembers is waking up on the floor with EMS around him.  He bit the left side of his tongue.  The tonic-clonic activity lasted for approximately 3-5 minutes at most but he was postictal for at least 20 minutes.  No urinary incontinence.  No recent fevers but he had some sinus infection the week prior.  No sleep deprivation.  No changes in medications.  Denies alcohol or drug use.  There is family history of seizures in patient's greater grandmother who was on phenobarbital.  His great aunt has a seizure disorder an is on zonisamide.  He also has a cousin with seizure disorder who is on oxcarbazepine.  He had febrile seizures as a child and his sister also had febrile seizures as child.  He had been on ADHD medication for impulsive behavior which has been discontinued.  He was also having eye blinking which patient is not aware of doing.  He he may have also had rare jerking movements of his extremities or holds a cup and it may drop out of his hand.  He was treated with Keppra 1000 mg IV x 1 and continued on 500 every 12 hours.  CPK 19.  Head CT showed no acute intracranial process. Of note he had a brain MRI scan with and without contrast done on 12/2/2023 which does not demonstrate any acute intracranial abnormalities.  He has had a normal awake and sleep EEG.  He was doing well with the levetiracetam 750 mg BID but still has had some eye twitching spells and some \"dejavu\" type sensations where he felt slightly off so I increased his dose of the " levetiracetam XR 1000 mg BID which he reports tolerating well with no further seizures though he was diagnosed with simple tics by movement disorder specialist.  He has not had any further seizures since 12/2023.  He is following seizure precautions.  He had lab work including CMP and CBC in 2/2025 which I reviewed today.  I spoke with them again today with regards to potentially doing a more prolonged EMU evaluation and taking him off of his levetiracetam to see if he continues to be at increased risk for seizures and to further classify possibly underlying epilepsy.  They are not interested in pursuing further testing at this time and would like to continue the current dose of levetiracetam XR 1000 mg BID as he has remained seizure free.  I spoke with them about having an acute rescue medication and will prescribe him Nayzilam for acute treatment of seizure lasting greater than 2-3 minutes or seizure cluster.  Discussed seizure precautions again and advised him to take his medication without missing any doses as seizures can be potentially fatal.

## 2025-08-02 ENCOUNTER — PATIENT MESSAGE (OUTPATIENT)
Dept: INTERNAL MEDICINE | Facility: CLINIC | Age: 25
End: 2025-08-02
Payer: COMMERCIAL

## 2025-08-02 DIAGNOSIS — Z91.018 TREE NUT ALLERGY: ICD-10-CM

## 2025-08-02 DIAGNOSIS — J45.909 ASTHMA, UNSPECIFIED ASTHMA SEVERITY, UNSPECIFIED WHETHER COMPLICATED, UNSPECIFIED WHETHER PERSISTENT: ICD-10-CM

## 2025-08-02 DIAGNOSIS — Z87.892 HISTORY OF ANAPHYLAXIS: ICD-10-CM

## 2025-08-04 RX ORDER — EPINEPHRINE 0.3 MG/.3ML
0.3 INJECTION SUBCUTANEOUS ONCE AS NEEDED
Qty: 1 EACH | Refills: 2 | Status: SHIPPED | OUTPATIENT
Start: 2025-08-04

## 2025-08-04 RX ORDER — MONTELUKAST SODIUM 10 MG/1
10 TABLET ORAL
Qty: 90 TABLET | Refills: 2 | Status: SHIPPED | OUTPATIENT
Start: 2025-08-04

## (undated) DEVICE — FRCP BX RADJAW4 NDL 2.8 240CM LG OG BX40

## (undated) DEVICE — BITEBLOCK OMNI BLOC

## (undated) DEVICE — LN SMPL CO2 SHTRM SD STREAM W/M LUER

## (undated) DEVICE — CANN O2 ETCO2 FITS ALL CONN CO2 SMPL A/ 7IN DISP LF

## (undated) DEVICE — SENSR O2 OXIMAX FNGR A/ 18IN NONSTR

## (undated) DEVICE — KT ORCA ORCAPOD DISP STRL

## (undated) DEVICE — TUBING, SUCTION, 1/4" X 10', STRAIGHT: Brand: MEDLINE

## (undated) DEVICE — ADAPT CLN BIOGUARD AIR/H2O DISP